# Patient Record
Sex: FEMALE | Race: WHITE | Employment: FULL TIME | URBAN - METROPOLITAN AREA
[De-identification: names, ages, dates, MRNs, and addresses within clinical notes are randomized per-mention and may not be internally consistent; named-entity substitution may affect disease eponyms.]

---

## 2017-01-12 ENCOUNTER — HOSPITAL ENCOUNTER (OUTPATIENT)
Dept: ULTRASOUND IMAGING | Age: 37
Discharge: HOME | End: 2017-01-12
Attending: OBSTETRICS & GYNECOLOGY
Payer: COMMERCIAL

## 2017-01-12 DIAGNOSIS — N94.89 ADNEXAL MASS: ICD-10-CM

## 2017-01-12 PROCEDURE — 76830 TRANSVAGINAL US NON-OB: CPT

## 2017-01-12 PROCEDURE — 76856 US EXAM PELVIC COMPLETE: CPT

## 2017-01-17 ENCOUNTER — HOSPITAL ENCOUNTER (OUTPATIENT)
Dept: MAMMOGRAPHY | Age: 37
Discharge: HOME | End: 2017-01-17
Attending: OBSTETRICS & GYNECOLOGY
Payer: COMMERCIAL

## 2017-01-17 DIAGNOSIS — Z12.31 ENCOUNTER FOR SCREENING MAMMOGRAM FOR MALIGNANT NEOPLASM OF BREAST: ICD-10-CM

## 2017-01-17 PROCEDURE — 77067 SCR MAMMO BI INCL CAD: CPT

## 2019-10-22 PROBLEM — G43.829 MENSTRUAL MIGRAINE, NOT INTRACTABLE, WITHOUT STATUS MIGRAINOSUS: Status: ACTIVE | Noted: 2019-05-17

## 2019-10-29 ENCOUNTER — LAB ENCOUNTER (OUTPATIENT)
Dept: LAB | Age: 39
End: 2019-10-29
Attending: NURSE PRACTITIONER
Payer: COMMERCIAL

## 2019-10-29 DIAGNOSIS — M25.571 ACUTE RIGHT ANKLE PAIN: ICD-10-CM

## 2019-10-29 PROCEDURE — 85025 COMPLETE CBC W/AUTO DIFF WBC: CPT

## 2019-10-29 PROCEDURE — 84550 ASSAY OF BLOOD/URIC ACID: CPT

## 2019-10-29 PROCEDURE — 85652 RBC SED RATE AUTOMATED: CPT

## 2019-10-29 PROCEDURE — 80048 BASIC METABOLIC PNL TOTAL CA: CPT

## 2019-11-26 ENCOUNTER — ANESTHESIA EVENT (OUTPATIENT)
Dept: ENDOSCOPY | Facility: HOSPITAL | Age: 39
End: 2019-11-26
Payer: COMMERCIAL

## 2019-11-27 ENCOUNTER — HOSPITAL ENCOUNTER (OUTPATIENT)
Facility: HOSPITAL | Age: 39
Setting detail: HOSPITAL OUTPATIENT SURGERY
Discharge: ANOTHER HOSPITAL | End: 2019-11-27
Attending: INTERNAL MEDICINE | Admitting: INTERNAL MEDICINE
Payer: COMMERCIAL

## 2019-11-27 ENCOUNTER — ANESTHESIA (OUTPATIENT)
Dept: ENDOSCOPY | Facility: HOSPITAL | Age: 39
End: 2019-11-27
Payer: COMMERCIAL

## 2019-11-27 ENCOUNTER — HOSPITAL ENCOUNTER (EMERGENCY)
Facility: HOSPITAL | Age: 39
Discharge: HOME | End: 2019-11-27
Attending: EMERGENCY MEDICINE
Payer: COMMERCIAL

## 2019-11-27 ENCOUNTER — APPOINTMENT (OUTPATIENT)
Dept: ULTRASOUND IMAGING | Facility: HOSPITAL | Age: 39
End: 2019-11-27
Attending: EMERGENCY MEDICINE
Payer: COMMERCIAL

## 2019-11-27 VITALS
WEIGHT: 180 LBS | DIASTOLIC BLOOD PRESSURE: 78 MMHG | HEART RATE: 104 BPM | OXYGEN SATURATION: 100 % | SYSTOLIC BLOOD PRESSURE: 114 MMHG | BODY MASS INDEX: 28.93 KG/M2 | RESPIRATION RATE: 18 BRPM | HEIGHT: 66 IN

## 2019-11-27 VITALS
TEMPERATURE: 98 F | HEART RATE: 94 BPM | DIASTOLIC BLOOD PRESSURE: 57 MMHG | WEIGHT: 180 LBS | SYSTOLIC BLOOD PRESSURE: 90 MMHG | BODY MASS INDEX: 28.59 KG/M2 | RESPIRATION RATE: 16 BRPM | HEIGHT: 66.5 IN | OXYGEN SATURATION: 97 %

## 2019-11-27 DIAGNOSIS — K52.9 COLITIS: ICD-10-CM

## 2019-11-27 DIAGNOSIS — M79.89 LEG SWELLING: Primary | ICD-10-CM

## 2019-11-27 DIAGNOSIS — M02.30 REACTIVE ARTHRITIS (HCC): ICD-10-CM

## 2019-11-27 DIAGNOSIS — L52 ERYTHEMA NODOSUM: ICD-10-CM

## 2019-11-27 PROCEDURE — 0DBE8ZX EXCISION OF LARGE INTESTINE, VIA NATURAL OR ARTIFICIAL OPENING ENDOSCOPIC, DIAGNOSTIC: ICD-10-PCS | Performed by: INTERNAL MEDICINE

## 2019-11-27 PROCEDURE — 96374 THER/PROPH/DIAG INJ IV PUSH: CPT

## 2019-11-27 PROCEDURE — 99284 EMERGENCY DEPT VISIT MOD MDM: CPT

## 2019-11-27 PROCEDURE — 93970 EXTREMITY STUDY: CPT | Performed by: EMERGENCY MEDICINE

## 2019-11-27 PROCEDURE — 88305 TISSUE EXAM BY PATHOLOGIST: CPT | Performed by: INTERNAL MEDICINE

## 2019-11-27 PROCEDURE — 81025 URINE PREGNANCY TEST: CPT | Performed by: INTERNAL MEDICINE

## 2019-11-27 RX ORDER — SODIUM CHLORIDE, SODIUM LACTATE, POTASSIUM CHLORIDE, CALCIUM CHLORIDE 600; 310; 30; 20 MG/100ML; MG/100ML; MG/100ML; MG/100ML
INJECTION, SOLUTION INTRAVENOUS CONTINUOUS
Status: DISCONTINUED | OUTPATIENT
Start: 2019-11-27 | End: 2019-11-27

## 2019-11-27 RX ORDER — METHYLPREDNISOLONE SODIUM SUCCINATE 40 MG/ML
20 INJECTION, POWDER, LYOPHILIZED, FOR SOLUTION INTRAMUSCULAR; INTRAVENOUS ONCE
Status: COMPLETED | OUTPATIENT
Start: 2019-11-27 | End: 2019-11-27

## 2019-11-27 RX ADMIN — SODIUM CHLORIDE, SODIUM LACTATE, POTASSIUM CHLORIDE, CALCIUM CHLORIDE: 600; 310; 30; 20 INJECTION, SOLUTION INTRAVENOUS at 08:35:00

## 2019-11-27 NOTE — ANESTHESIA POSTPROCEDURE EVALUATION
[facility] Patient Status:  Hospital Outpatient Surgery   Age/Gender 35year old female MRN [id number]   Location [facility]. Attending Jacklyn Clemens MD   Hosp Day # 0 PCP Lady Sal MD       Anesthesia Pos

## 2019-11-27 NOTE — OPERATIVE REPORT
[facility]    PATIENT'S NAME: Kaylee Jason   ATTENDING PHYSICIAN: Marito Beach M.D. OPERATING PHYSICIAN: Marito Beach M.D.    PATIENT ACCOUNT#:   [de-identified]    LOCATION:  [facility] ROOMS 8 EDW  MEDICAL RECORD #:   [id number]

## 2019-11-27 NOTE — BRIEF OP NOTE
Pre-Operative Diagnosis: Colitis [K52.9]     Post-Operative Diagnosis: ulcerative colitis      Procedure Performed:   Procedure(s):  Colonoscopy with biopsy    Surgeon(s) and Role:     Helen Steen MD - Primary    Assistant(s):        Surgical Find

## 2019-11-27 NOTE — H&P
[facility] Endoscopy Health History   [facility] Department of  Gastroenterology  Update Health History :       Nasir Mcclure  female   Kin MD Alejandra     [id number]  7/30/1980 Primary Care Physician  Kofi Fagan MD        HPI  (90 Base) MCG/ACT Inhalation Aero Soln, INHALE 2 PUFFS BY MOUTH EVERY 4 HOURS AS NEEDED, Disp: 1 Inhaler, Rfl: 3  Levonorgestrel-Ethinyl Estrad (KRISTINE-28) 0.15-30 MG-MCG Oral Tab, Take by mouth., Disp: , Rfl:   SUMATRIPTAN SUCCINATE 50 MG Oral Tab answered, patient verbalized understanding and agreed to proceed with procedure as scheduled.       Aki Roberts MD

## 2019-11-27 NOTE — ED PROVIDER NOTES
Patient Seen in: [facility] Emergency Department      History   Patient presents with:  Swelling Edema (cardiovascular, metabolic)    Stated Complaint: swelling    HPI    54-year-old with a history of asthma presents from the GI lab for lower extrem Alcohol use: Yes      Frequency: Monthly or less      Drinks per session: 1 or 2      Binge frequency: Never    Drug use: No             Review of Systems    Positive for stated complaint: swelling  Other systems are as noted in HPI.   Constitutional and vi Impression:  Leg swelling  (primary encounter diagnosis)  Erythema nodosum  Reactive arthritis [facility])    Disposition:  Discharge  11/27/2019 11:22 am    Follow-up:  Redd Jaquez MD  [facility] Doctor [address]  [address] [place] [address]  [phone] c

## 2019-11-27 NOTE — ANESTHESIA PREPROCEDURE EVALUATION
PRE-OP EVALUATION    Patient Name: Collene Mcardle    Pre-op Diagnosis: Colitis [K52.9]    Procedure(s):  Colonoscopy    Surgeon(s) and Role:     Magy Lebron MD - Primary    Pre-op vitals reviewed. Body mass index is 28.62 kg/m².     Current m 2      Binge frequency: Never      Drug use: No     Available pre-op labs reviewed.   Lab Results   Component Value Date    WBC 8.2 10/29/2019    RBC 4.44 10/29/2019    HGB 12.8 10/29/2019    HCT 38.3 10/29/2019    MCV 86.3 10/29/2019    MCH 28.8 10/29/2019

## 2019-11-27 NOTE — OR NURSING
Patient's IV saline locked for pending discharge to Emergency Room.   ER Charge RN notified of patient's pending arrival.  Pt transported via wheelchair to ER; pt's sister Jose Hearn and all belongings with patient

## 2019-12-02 NOTE — PROGRESS NOTES
12/2/2019  Leigha Rai  [facility]    Dear Rigoberto Romero,       Here are the  biopsy/pathology findings from your recent Colonoscopy :    colitis - an inflammation of the large intestine (colon).       If you need any further assistance , p

## 2019-12-11 PROBLEM — K51.90 ULCERATIVE COLITIS (HCC): Status: ACTIVE | Noted: 2019-12-11

## 2019-12-17 ENCOUNTER — HOSPITAL ENCOUNTER (OUTPATIENT)
Dept: GENERAL RADIOLOGY | Age: 39
Discharge: HOME | End: 2019-12-17
Attending: INTERNAL MEDICINE
Payer: COMMERCIAL

## 2019-12-17 DIAGNOSIS — R76.12 FALSE POSITIVE QUANTIFERON-TB GOLD TEST: ICD-10-CM

## 2019-12-17 PROCEDURE — 71046 X-RAY EXAM CHEST 2 VIEWS: CPT | Performed by: INTERNAL MEDICINE

## 2020-02-27 ENCOUNTER — HOSPITAL ENCOUNTER (INPATIENT)
Facility: HOSPITAL | Age: 40
LOS: 3 days | Discharge: HOME | DRG: 065 | End: 2020-03-01
Attending: EMERGENCY MEDICINE | Admitting: HOSPITALIST
Payer: COMMERCIAL

## 2020-02-27 ENCOUNTER — APPOINTMENT (OUTPATIENT)
Dept: GENERAL RADIOLOGY | Age: 40
DRG: 065 | End: 2020-02-27
Attending: EMERGENCY MEDICINE
Payer: COMMERCIAL

## 2020-02-27 ENCOUNTER — APPOINTMENT (OUTPATIENT)
Dept: MRI IMAGING | Age: 40
DRG: 065 | End: 2020-02-27
Attending: EMERGENCY MEDICINE
Payer: COMMERCIAL

## 2020-02-27 DIAGNOSIS — I63.9 ACUTE ISCHEMIC STROKE (HCC): Primary | ICD-10-CM

## 2020-02-27 DIAGNOSIS — R41.0 DISORIENTATION: ICD-10-CM

## 2020-02-27 PROBLEM — E87.6 HYPOKALEMIA: Status: ACTIVE | Noted: 2020-02-27

## 2020-02-27 LAB
ALBUMIN SERPL-MCNC: 3.9 G/DL (ref 3.4–5)
ALBUMIN/GLOB SERPL: 0.9 {RATIO} (ref 1–2)
ALP LIVER SERPL-CCNC: 59 U/L (ref 37–98)
ALT SERPL-CCNC: 13 U/L (ref 13–56)
AMPHET UR QL SCN: NEGATIVE
ANION GAP SERPL CALC-SCNC: 9 MMOL/L (ref 0–18)
APTT PPP: 21.6 SECONDS (ref 25.4–36.1)
AST SERPL-CCNC: 14 U/L (ref 15–37)
BARBITURATES UR QL SCN: NEGATIVE
BASOPHILS # BLD AUTO: 0.02 X10(3) UL (ref 0–0.2)
BASOPHILS NFR BLD AUTO: 0.2 %
BENZODIAZ UR QL SCN: NEGATIVE
BILIRUB SERPL-MCNC: 0.3 MG/DL (ref 0.1–2)
BILIRUB UR QL STRIP.AUTO: NEGATIVE
BUN BLD-MCNC: 12 MG/DL (ref 7–18)
BUN/CREAT SERPL: 13.5 (ref 10–20)
CALCIUM BLD-MCNC: 8.9 MG/DL (ref 8.5–10.1)
CANNABINOIDS UR QL SCN: NEGATIVE
CHLORIDE SERPL-SCNC: 105 MMOL/L (ref 98–112)
CO2 SERPL-SCNC: 22 MMOL/L (ref 21–32)
COCAINE UR QL: NEGATIVE
COLOR UR AUTO: YELLOW
CREAT BLD-MCNC: 0.89 MG/DL (ref 0.55–1.02)
CREAT UR-SCNC: 110 MG/DL
DEPRECATED RDW RBC AUTO: 37.3 FL (ref 35.1–46.3)
EOSINOPHIL # BLD AUTO: 0.35 X10(3) UL (ref 0–0.7)
EOSINOPHIL NFR BLD AUTO: 4.3 %
ERYTHROCYTE [DISTWIDTH] IN BLOOD BY AUTOMATED COUNT: 12.1 % (ref 11–15)
ETHANOL SERPL-MCNC: <3 MG/DL (ref ?–3)
ETHANOL UR-MCNC: NEGATIVE MG/DL
GLOBULIN PLAS-MCNC: 4.5 G/DL (ref 2.8–4.4)
GLUCOSE BLD-MCNC: 105 MG/DL (ref 70–99)
GLUCOSE BLD-MCNC: 99 MG/DL (ref 70–99)
GLUCOSE UR STRIP.AUTO-MCNC: NEGATIVE MG/DL
HCT VFR BLD AUTO: 39.7 % (ref 35–48)
HGB BLD-MCNC: 13.6 G/DL (ref 12–16)
IMM GRANULOCYTES # BLD AUTO: 0.03 X10(3) UL (ref 0–1)
IMM GRANULOCYTES NFR BLD: 0.4 %
INR BLD: 1.27 (ref 0.9–1.1)
KETONES UR STRIP.AUTO-MCNC: NEGATIVE MG/DL
LYMPHOCYTES # BLD AUTO: 1.46 X10(3) UL (ref 1–4)
LYMPHOCYTES NFR BLD AUTO: 17.8 %
M PROTEIN MFR SERPL ELPH: 8.4 G/DL (ref 6.4–8.2)
MCH RBC QN AUTO: 29.3 PG (ref 26–34)
MCHC RBC AUTO-ENTMCNC: 34.3 G/DL (ref 31–37)
MCV RBC AUTO: 85.6 FL (ref 80–100)
MONOCYTES # BLD AUTO: 0.73 X10(3) UL (ref 0.1–1)
MONOCYTES NFR BLD AUTO: 8.9 %
NEUTROPHILS # BLD AUTO: 5.59 X10 (3) UL (ref 1.5–7.7)
NEUTROPHILS # BLD AUTO: 5.59 X10(3) UL (ref 1.5–7.7)
NEUTROPHILS NFR BLD AUTO: 68.4 %
NITRITE UR QL STRIP.AUTO: NEGATIVE
OPIATES UR QL SCN: NEGATIVE
OSMOLALITY SERPL CALC.SUM OF ELEC: 282 MOSM/KG (ref 275–295)
PCP UR QL SCN: NEGATIVE
PH UR STRIP.AUTO: 6.5 [PH] (ref 4.5–8)
PLATELET # BLD AUTO: 235 10(3)UL (ref 150–450)
POCT LOT NUMBER: NORMAL
POCT URINE PREGNANCY: NEGATIVE
POTASSIUM SERPL-SCNC: 3.5 MMOL/L (ref 3.5–5.1)
PROT UR STRIP.AUTO-MCNC: NEGATIVE MG/DL
PSA SERPL DL<=0.01 NG/ML-MCNC: 16.2 SECONDS (ref 12.2–14.4)
RBC # BLD AUTO: 4.64 X10(6)UL (ref 3.8–5.3)
RBC UR QL AUTO: NEGATIVE
SED RATE-ML: 26 MM/HR (ref 0–25)
SODIUM SERPL-SCNC: 136 MMOL/L (ref 136–145)
SP GR UR STRIP.AUTO: 1.01 (ref 1–1.03)
T4 FREE SERPL-MCNC: 1 NG/DL (ref 0.8–1.7)
TROPONIN I SERPL-MCNC: <0.045 NG/ML (ref ?–0.04)
UROBILINOGEN UR STRIP.AUTO-MCNC: 0.2 MG/DL
WBC # BLD AUTO: 8.2 X10(3) UL (ref 4–11)

## 2020-02-27 PROCEDURE — 70553 MRI BRAIN STEM W/O & W/DYE: CPT | Performed by: EMERGENCY MEDICINE

## 2020-02-27 PROCEDURE — 99291 CRITICAL CARE FIRST HOUR: CPT | Performed by: NURSE PRACTITIONER

## 2020-02-27 PROCEDURE — 70544 MR ANGIOGRAPHY HEAD W/O DYE: CPT | Performed by: EMERGENCY MEDICINE

## 2020-02-27 PROCEDURE — 71045 X-RAY EXAM CHEST 1 VIEW: CPT | Performed by: EMERGENCY MEDICINE

## 2020-02-27 RX ORDER — LORAZEPAM 2 MG/ML
0.5 INJECTION INTRAMUSCULAR ONCE
Status: COMPLETED | OUTPATIENT
Start: 2020-02-27 | End: 2020-02-27

## 2020-02-27 RX ORDER — SODIUM CHLORIDE 9 MG/ML
INJECTION, SOLUTION INTRAVENOUS CONTINUOUS
Status: ACTIVE | OUTPATIENT
Start: 2020-02-27 | End: 2020-02-29

## 2020-02-27 RX ORDER — ACETAMINOPHEN 325 MG/1
650 TABLET ORAL EVERY 4 HOURS PRN
Status: DISCONTINUED | OUTPATIENT
Start: 2020-02-27 | End: 2020-03-01

## 2020-02-27 RX ORDER — ASPIRIN 81 MG/1
324 TABLET, CHEWABLE ORAL ONCE
Status: COMPLETED | OUTPATIENT
Start: 2020-02-27 | End: 2020-02-27

## 2020-02-27 RX ORDER — DOCUSATE SODIUM 100 MG/1
100 CAPSULE, LIQUID FILLED ORAL 2 TIMES DAILY
Status: DISCONTINUED | OUTPATIENT
Start: 2020-02-27 | End: 2020-03-01

## 2020-02-27 RX ORDER — FAMOTIDINE 20 MG/1
20 TABLET ORAL 2 TIMES DAILY
Status: DISCONTINUED | OUTPATIENT
Start: 2020-02-27 | End: 2020-02-28

## 2020-02-27 RX ORDER — PHENYLEPHRINE HCL IN 0.9% NACL 50MG/250ML
PLASTIC BAG, INJECTION (ML) INTRAVENOUS CONTINUOUS PRN
Status: DISCONTINUED | OUTPATIENT
Start: 2020-02-27 | End: 2020-03-01

## 2020-02-27 RX ORDER — ONDANSETRON 2 MG/ML
4 INJECTION INTRAMUSCULAR; INTRAVENOUS EVERY 6 HOURS PRN
Status: DISCONTINUED | OUTPATIENT
Start: 2020-02-27 | End: 2020-03-01

## 2020-02-27 RX ORDER — ATORVASTATIN CALCIUM 80 MG/1
80 TABLET, FILM COATED ORAL NIGHTLY
Status: DISCONTINUED | OUTPATIENT
Start: 2020-02-27 | End: 2020-03-01

## 2020-02-27 RX ORDER — BISACODYL 10 MG
10 SUPPOSITORY, RECTAL RECTAL
Status: DISCONTINUED | OUTPATIENT
Start: 2020-02-27 | End: 2020-03-01

## 2020-02-27 RX ORDER — POLYETHYLENE GLYCOL 3350 17 G/17G
17 POWDER, FOR SOLUTION ORAL DAILY PRN
Status: DISCONTINUED | OUTPATIENT
Start: 2020-02-27 | End: 2020-03-01

## 2020-02-27 RX ORDER — HEPARIN SODIUM 5000 [USP'U]/ML
5000 INJECTION, SOLUTION INTRAVENOUS; SUBCUTANEOUS EVERY 12 HOURS SCHEDULED
Status: DISCONTINUED | OUTPATIENT
Start: 2020-02-27 | End: 2020-03-01

## 2020-02-27 RX ORDER — ASPIRIN 300 MG
300 SUPPOSITORY, RECTAL RECTAL DAILY
Status: DISCONTINUED | OUTPATIENT
Start: 2020-02-28 | End: 2020-03-01

## 2020-02-27 RX ORDER — ACETAMINOPHEN 650 MG/1
650 SUPPOSITORY RECTAL EVERY 4 HOURS PRN
Status: DISCONTINUED | OUTPATIENT
Start: 2020-02-27 | End: 2020-03-01

## 2020-02-27 RX ORDER — METOCLOPRAMIDE HYDROCHLORIDE 5 MG/ML
10 INJECTION INTRAMUSCULAR; INTRAVENOUS EVERY 8 HOURS PRN
Status: DISCONTINUED | OUTPATIENT
Start: 2020-02-27 | End: 2020-03-01

## 2020-02-27 RX ORDER — LABETALOL HYDROCHLORIDE 5 MG/ML
10 INJECTION, SOLUTION INTRAVENOUS EVERY 10 MIN PRN
Status: DISCONTINUED | OUTPATIENT
Start: 2020-02-27 | End: 2020-03-01

## 2020-02-27 RX ORDER — SODIUM CHLORIDE 9 MG/ML
INJECTION, SOLUTION INTRAVENOUS CONTINUOUS
Status: ACTIVE | OUTPATIENT
Start: 2020-02-27 | End: 2020-02-27

## 2020-02-27 RX ORDER — FAMOTIDINE 10 MG/ML
20 INJECTION, SOLUTION INTRAVENOUS 2 TIMES DAILY
Status: DISCONTINUED | OUTPATIENT
Start: 2020-02-27 | End: 2020-02-28

## 2020-02-27 RX ORDER — SODIUM PHOSPHATE, DIBASIC AND SODIUM PHOSPHATE, MONOBASIC 7; 19 G/133ML; G/133ML
1 ENEMA RECTAL ONCE AS NEEDED
Status: DISCONTINUED | OUTPATIENT
Start: 2020-02-27 | End: 2020-03-01

## 2020-02-27 RX ORDER — ASPIRIN 325 MG
325 TABLET ORAL DAILY
Status: DISCONTINUED | OUTPATIENT
Start: 2020-02-28 | End: 2020-03-01

## 2020-02-27 RX ORDER — SENNOSIDES 8.6 MG
17.2 TABLET ORAL NIGHTLY
Status: DISCONTINUED | OUTPATIENT
Start: 2020-02-27 | End: 2020-03-01

## 2020-02-27 NOTE — ED INITIAL ASSESSMENT (HPI)
36 y/o female to ED with c/o of altered mental status. Per coworker patient was fine this morning. At about 1430, patient was not acting norm, \"unable to recall how to do a hemoglobin stick on a child\".  As day went on, mental status changes were not impr

## 2020-02-27 NOTE — ED PROVIDER NOTES
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 No abdominal masses.   No peritoneal signs   Extremities: no edema, normal peripheral pulses   Neuro: Alert and nonfo Abnormality         Status                     ---------                               -----------         ------                     CBC W/ DIFFERENTIAL[[id number]]                              Final result                 Please view results fo paged.    8:15 PM–Case discussed with Dr. Heidi Man from stroke neurology. Patient given aspirin after passing her swallow test.    Patient had no worsening during course in the emergency department. She was stable.      02/27/20 2008 02/27/20 2035 02/27

## 2020-02-28 ENCOUNTER — APPOINTMENT (OUTPATIENT)
Dept: CV DIAGNOSTICS | Facility: HOSPITAL | Age: 40
DRG: 065 | End: 2020-02-28
Attending: EMERGENCY MEDICINE
Payer: COMMERCIAL

## 2020-02-28 ENCOUNTER — APPOINTMENT (OUTPATIENT)
Dept: ULTRASOUND IMAGING | Facility: HOSPITAL | Age: 40
DRG: 065 | End: 2020-02-28
Attending: INTERNAL MEDICINE
Payer: COMMERCIAL

## 2020-02-28 PROBLEM — I63.512 ACUTE ISCHEMIC LEFT MCA STROKE (HCC): Status: ACTIVE | Noted: 2020-02-27

## 2020-02-28 LAB
ATRIAL RATE: 121 BPM
C DIFF TOX B STL QL: NEGATIVE
CHOLEST SMN-MCNC: 187 MG/DL (ref ?–200)
CRP SERPL-MCNC: 0.63 MG/DL (ref ?–0.3)
EST. AVERAGE GLUCOSE BLD GHB EST-MCNC: 91 MG/DL (ref 68–126)
GLUCOSE BLD-MCNC: 119 MG/DL (ref 70–99)
GLUCOSE BLD-MCNC: 123 MG/DL (ref 70–99)
GLUCOSE BLD-MCNC: 91 MG/DL (ref 70–99)
GLUCOSE BLD-MCNC: 99 MG/DL (ref 70–99)
HAV IGM SER QL: 2.1 MG/DL (ref 1.6–2.6)
HBA1C MFR BLD HPLC: 4.8 % (ref ?–5.7)
HDLC SERPL-MCNC: 74 MG/DL (ref 40–59)
LDLC SERPL CALC-MCNC: 82 MG/DL (ref ?–100)
NONHDLC SERPL-MCNC: 113 MG/DL (ref ?–130)
P AXIS: 55 DEGREES
P-R INTERVAL: 132 MS
POTASSIUM SERPL-SCNC: 3.8 MMOL/L (ref 3.5–5.1)
Q-T INTERVAL: 340 MS
QRS DURATION: 66 MS
QTC CALCULATION (BEZET): 482 MS
R AXIS: 48 DEGREES
T AXIS: 46 DEGREES
TRIGL SERPL-MCNC: 156 MG/DL (ref 30–149)
VENTRICULAR RATE: 121 BPM
VLDLC SERPL CALC-MCNC: 31 MG/DL (ref 0–30)

## 2020-02-28 PROCEDURE — 99291 CRITICAL CARE FIRST HOUR: CPT | Performed by: INTERNAL MEDICINE

## 2020-02-28 PROCEDURE — 99221 1ST HOSP IP/OBS SF/LOW 40: CPT | Performed by: NURSE PRACTITIONER

## 2020-02-28 PROCEDURE — 93306 TTE W/DOPPLER COMPLETE: CPT | Performed by: NURSE PRACTITIONER

## 2020-02-28 PROCEDURE — 93880 EXTRACRANIAL BILAT STUDY: CPT | Performed by: INTERNAL MEDICINE

## 2020-02-28 RX ORDER — ALBUTEROL SULFATE 2.5 MG/3ML
3 SOLUTION RESPIRATORY (INHALATION) EVERY 4 HOURS PRN
Status: DISCONTINUED | OUTPATIENT
Start: 2020-02-28 | End: 2020-03-01

## 2020-02-28 RX ORDER — POTASSIUM CHLORIDE 20 MEQ/1
40 TABLET, EXTENDED RELEASE ORAL ONCE
Status: COMPLETED | OUTPATIENT
Start: 2020-02-28 | End: 2020-02-28

## 2020-02-28 RX ORDER — BALSALAZIDE DISODIUM 750 MG/1
2250 CAPSULE ORAL 3 TIMES DAILY
Status: DISCONTINUED | OUTPATIENT
Start: 2020-02-28 | End: 2020-03-01

## 2020-02-28 RX ORDER — DICYCLOMINE HYDROCHLORIDE 10 MG/1
10 CAPSULE ORAL
Status: DISCONTINUED | OUTPATIENT
Start: 2020-02-28 | End: 2020-03-01

## 2020-02-28 RX ORDER — POTASSIUM CHLORIDE 20 MEQ/1
40 TABLET, EXTENDED RELEASE ORAL EVERY 4 HOURS
Status: COMPLETED | OUTPATIENT
Start: 2020-02-28 | End: 2020-02-28

## 2020-02-28 NOTE — ED NOTES
Report given to floor RN Antoni. THE [facility] OF [facility] ambulance arrived for patient. Patient in stable condition.

## 2020-02-28 NOTE — ED NOTES
Patient returned from MRI, patient in stable condition. Condition remains unchanged, continues to have delayed responses. Patient scored a 2 on NIH, unable to state  and best language scored a 1. Sister and mother now at bedside.

## 2020-02-28 NOTE — PROGRESS NOTES
[facility]  Neurocritical Care APRN Progress Note    NAME: Yessica Hassan - ROOM: 1596/4420-Q - MRN: [id number] - Age: 35year old - :1980    History Of Present Illness:  Yessica Hassan is a 35year old female with PMHx significant [facility] ENDOSCOPY   • Dilation/curettage,diagnostic      2009   • Cherelle localization wire 1 site right (cpt=19281) Left 9/2010, 2/11    breast abcess   • Other  1998    laproscopic for endometriosis     Social Hx:  Social History    Tobacco Use      Smoking statu extremities  Skin: Skin color, texture, turgor normal for ethnicity, no rashes or lesions, warm and dry  Neurologic: This patient is alert and orientated x 3--by yes no questions. Speech minimal/near mute, slow responses when speaks.  Pupils equally round 02/27/2020    INR 1.27 02/27/2020     Assessment/Plan:  1. Acute ischemic stroke--left MCA territory based on ED documentation (unable to see imaging currently)  2. Expressive aphasia  3. Delayed response  4.   IBS/UC--will need to hold at least Humira f

## 2020-02-28 NOTE — PHYSICAL THERAPY NOTE
PHYSICAL THERAPY QUICK EVALUATION - INPATIENT    Room Number: 5513/0701-X  Evaluation Date: 2/28/2020  Presenting Problem: S/p L MCA infarct  Physician Order: PT Eval and Treat    History Related to Current Admission:Pt admitted through ER w/ co-worker n derian. OBJECTIVE  Precautions:  Other (Comment)(-180 )  Fall Risk: Standard fall risk    WEIGHT BEARING RESTRICTION  Weight Bearing Restriction: None                PAIN ASSESSMENT  Ratin  Location: Denies      RANGE OF MOTION AND STRENGTH Limits  Stoop/Curb Assistance: Not tested       Skilled Therapy Provided: Pt completed supine to sit w/ complete independence. Pt completed sit<>stand w/ cues for proper hand placement and independence both w/ and w/o ue support.   Pt completed gait 150'x1 Physical Therapy services. Please re-order if a new functional limitation presents during this admission. GOALS  Patient was able to achieve the following goals . ..     Patient was able to transfer Safely and independently   Patient able to ambulate on

## 2020-02-28 NOTE — ED NOTES
Patient's condition remains unchanged. Continues to have delayed responses when asked questions, patient answers correctly. MRI called will be ready for patient ETA x20 minutes.

## 2020-02-28 NOTE — ED NOTES
Per ERMD, neurologist requesting for patient to have aspirin. Patient passed dysphagia screen with 90ml water.

## 2020-02-28 NOTE — CONSULTS
[facility]  Interventional Neuroradiology  Consultation Note    Kiya Snider Patient Status:  Inpatient    1980 MRN [id number]   [facility] 6NE-A Attending Gill Nichols MD   Hosp Day # 1 PCP Melody Soni MD     REASON Medical History:   Diagnosis Date   • Asthma    • Closed fracture of middle or proximal phalanx or phalanges of hand 6/20/2013    Log Date: 03/25/2013    • Colitis    • Endometriosis    • Extrinsic asthma, unspecified    • IBS (irritable bowel syndrome) MG Oral Cap, Sig Take 2,250 mg by mouth 3 (three) times daily. , Start Date , End Date , Taking?  Yes, Authorizing Provider External/Patient, Reported    Medication Dicyclomine HCl 20 MG Oral Tab, Sig Take 20 mg by mouth 4 (four) times daily before meals and PRN  niCARdipine HCl in NaCl (CARDENE) 20 mg/200 ml premix infusion, 5-15 mg/hr, Intravenous, Continuous PRN  phenylephrine in NaCl (BRENDA-SYNEPHRINE) 50 mg/250 ml premix infusion SOLN, 100-200 mcg/min, Intravenous, Continuous PRN  atorvastatin (LIPITOR) tab EOMI, no nystagmus   CN V: Facial sensation normal    CN VII: Symmetric facial movement   CN VIII: Normal hearing   CN IX, XI: uvula and palate elevate symmetrically    CN XI: shoulder shrug equal    CN XII: tongue midline  Motor: No drift, no focal arm or acute onset of speech difficulties and confusion while at work. She denies any associated foal weakness, numbness/tingling, headache or visual difficulties. Her presenting NIHSS was reported a 2 for expressive aphasia.  MRI/MRA brain revealed Left MCA distr

## 2020-02-28 NOTE — CONSULTS
[facility]  Neurocritical Care Consult Note    Laya Ewing Patient Status:  Inpatient    1980 MRN [id number]   [facility] 6NE-A Attending Delmi Brian MD   Hosp Day # 1 PCP Winsome Alvarado MD       Reason REPEAT IN 2 HOURS AS NEEDED, Disp: 14 tablet, Rfl: 3, Past Month at Unknown time  Levonorgestrel-Ethinyl Estrad (KRISTINE-28) 0.15-30 MG-MCG Oral Tab, Take by mouth., Disp: , Rfl: , 2/27/2020 at Unknown time  ALBUTEROL SULFATE  (90 Base) MCG/ACT Inhal Grandfather    • Diabetes Paternal Grandfather        Current Meds:  albuterol sulfate (VENTOLIN) (2.5 MG/3ML) 0.083% nebulizer solution 3 mL, 3 mL, Nebulization, Q4H PRN  Balsalazide Disodium (COLAZAL) cap 2,250 mg, 2,250 mg, Oral, TID  sodium chloride 0. palpitations or lower extremity edema. GI:                         Denies constipation, heartburn or melena. :                       Denies dysuria or hematuria. Skin:                     Denies rashes or open areas. Neuro:                   As per HP breathing complications. FINDINGS: Cardiac silhouette and pulmonary vasculature are unremarkable. No consolidation, pleural effusion or pneumothorax. IMPRESSION: Unremarkable portable chest radiograph.    Dictated by: Santos Morris MD on 2/27/2020 at

## 2020-02-28 NOTE — PHYSICAL THERAPY NOTE
Physical Therapy    Attempted to see pt for PT eval, but pt is on bedrest at this time. Spoke with RN who stated pt may be appropriate following Neuro round. Will f/u either later today or tomorrow pending upgraded activity orders.

## 2020-02-28 NOTE — PLAN OF CARE
Care asumed @ 0730. Awake. alert & oriented, with ongoing expressive aphasia but improved per family from admission. No ataxia or dysarthria. Christiano gtt titrated up to maintain parameters 130-180. Up to [facility] for voiding. RSR per monitor.  Denies HA, nausea or diz Free from bleeding injury  Description  (Example usage: patient with low platelets)  INTERVENTIONS:  - Avoid intramuscular injections, enemas and rectal medication administration  - Ensure safe mobilization of patient  - Hold pressure on venipuncture sites

## 2020-02-28 NOTE — OCCUPATIONAL THERAPY NOTE
OCCUPATIONAL THERAPY EVALUATION - INPATIENT     Room Number: 6873/6620-E  Evaluation Date: 2/28/2020  Type of Evaluation: Initial  Presenting Problem: L MCA CVA     Physician Order: IP Consult to Occupational Therapy  Reason for Therapy: ADL/IADL Dysfuncti OTHER  1998    [facility]       OCCUPATIONAL PROFILE    HOME SITUATION  Type of Home: House  Home Layout: One level  Lives With: Family(2 children (9&16), brother/his girlfriend and child )    Toilet and Equipment: Standard height toilet of cognitive impairment and records cognitive changes over time.  The MMSE addresses 7 cognitive domains including: orientation to time, orientation to place, registration of three words, attention and calculation, recall of three words, language and visual RN/PCT was made aware of pt's present position and condition. Patient End of Session: Up in chair;Needs met;Call light within reach; With [facility] staff;RN aware of session/findings; All patient questions and concerns addressed; Family present    ASSESSMENT     P Goals   Patient will perform all ADLs: with modified independent    Functional Transfer Goals  Patient will perform all functional transfers:  with modified independent    Additional Goals:  9HPT to be administered    strength assessment     Pt will pe

## 2020-02-28 NOTE — PAYOR COMM NOTE
--------------  Appropriate for inpatient status per guidelines for stroke.         ADMISSION REVIEW     Payor: [address] [place] [address] Drive #:  [id number]  Authorization Number: N/A       ED Provider Notes             Patient Seen in: THE [facility] Emergency Depa Exam    General:  Vitals as listed. No acute distress. Peculiar affect. HEENT: Sclerae anicteric. Conjunctivae show no pallor.   Oropharynx clear, mucous membranes moist   Neck: supple, no rigidity   Lungs: good air exchange and clear   Heart: regular r CBC WITH DIFFERENTIAL WITH PLATELET    Narrative: The following orders were created for panel order CBC WITH DIFFERENTIAL WITH PLATELET.   Procedure                               Abnormality         Status                     --------- diagnosis)  Disorientation    Disposition:  Admit  2/27/2020  8:02 pm                   H&P - H&P Note          [facility] Hospitalist H&P       CC: Patient presents with:  Altered Mental Status       PCP: Janel Pagan MD    History of Present Illness: Pa pallor, EOMs intact. Nose: Nares normal. Septum midline. Mucosa normal. No drainage.    Throat: Lips, mucosa, and tongue normal. Teeth and gums normal.   Neck: Supple, symmetrical, trachea midline, no cervical or supraclavicular lymph adenopathy, thyroid MD on 2/27/2020 at 20:11     Approved by: Coleen De Leon MD on 2/27/2020 at 20:11             ASSESSMENT / PLAN:     Acute CVA  -neuro w/u in process per neuro/neuro crit care  -LDL 82 and HDL 74.  -CRP is elevated at 0.63  -UDS neg; UA was contaminate 163/95Abnormal  100 % 190 lb None (Room air)

## 2020-02-28 NOTE — PLAN OF CARE
Received pt from CCU around 1700  Expressive aphasia present  C/o HA. Pt believes from not having caffeine today. Denies need for medcation  Visitor at bedside.  Updated on POC

## 2020-02-28 NOTE — OCCUPATIONAL THERAPY NOTE
Attempted to see pt for OT eval, but pt is on bedrest at this time. Spoke with RN who stated pt may be appropriate following Neuro round. Will f/u either later today or tomorrow pending upgraded activity orders.

## 2020-02-28 NOTE — CM/SW NOTE
[facility] eval order received, PT/OT  on consult, unable to work with patient today due to her being on bedrest, SW  to evaluate and assess for needs post PT/OT  Session. SW will continue to follow up.     Jamie Bueno, [address] [place] [address]

## 2020-02-28 NOTE — SLP NOTE
ADULT SWALLOWING EVALUATION    ASSESSMENT    ASSESSMENT/OVERALL IMPRESSION:  Patient seen for swallowing evaluation. Patient alert and up in bed with her sister present and supportive. Patient admitted due to AMS and difficulty with communication.   Courtney Sutton • Colitis    • Endometriosis    • Extrinsic asthma, unspecified    • IBS (irritable bowel syndrome)    • Migraines    • Stroke [facility])    • Visual impairment           Diet Prior to Admission: Regular; Thin liquids  Precautions: Aspiration    Patient/Family

## 2020-02-28 NOTE — PLAN OF CARE
Received pt at 2115. Pt alert and having expressive aphasia with delayed responses. Q1hr neuro checks. Pt is oriented when given choices but is having trouble stating date and location.  Pt has equal strength in AMY extremities, no visual deficits, no sensa

## 2020-02-28 NOTE — H&P
[facility] Hospitalist H&P       CC: Patient presents with:  Altered Mental Status       PCP: Regine Watts MD    History of Present Illness: Patient is a 35year old female with PMH sig for asthma, ulcerative colitis and migraines is admitted with compl MG Oral Tab, TAKE 1 TABLET BY MOUTH 1 TIME AT ONSET OF MIGRAINE. MAY REPEAT IN 2 HOURS AS NEEDED, Disp: 14 tablet, Rfl: 3  Levonorgestrel-Ethinyl Estrad (KRISTINE-28) 0.15-30 MG-MCG Oral Tab, Take by mouth., Disp: , Rfl:           Soc Hx  Social History    T auscultation bilaterally. Normal effort   Chest wall:  No tenderness or deformity. Heart:  Regular rate and rhythm, S1, S2 normal, no murmur, rub or gallop appreciated   Abdomen:   Soft, non-tender. Bowel sounds normal. No masses,  No organomegaly.  Non d echo  -monitor on tele  -ST/PT/OT  -hold her OCP - this is contraindicated given she has a migraine history with auras    Mild hypokalemia  -replete per protocol    Ulcerative colitis  -hold adalimumab  -cont balsalazide  -resume bentyl when taking PO    M

## 2020-02-29 ENCOUNTER — APPOINTMENT (OUTPATIENT)
Dept: CT IMAGING | Facility: HOSPITAL | Age: 40
DRG: 065 | End: 2020-02-29
Attending: Other
Payer: COMMERCIAL

## 2020-02-29 LAB
ALBUMIN SERPL-MCNC: 3 G/DL (ref 3.4–5)
ALBUMIN/GLOB SERPL: 0.8 {RATIO} (ref 1–2)
ALP LIVER SERPL-CCNC: 47 U/L (ref 37–98)
ALT SERPL-CCNC: 10 U/L (ref 13–56)
ANION GAP SERPL CALC-SCNC: 5 MMOL/L (ref 0–18)
AST SERPL-CCNC: 11 U/L (ref 15–37)
BILIRUB SERPL-MCNC: 0.5 MG/DL (ref 0.1–2)
BUN BLD-MCNC: 6 MG/DL (ref 7–18)
BUN/CREAT SERPL: 10 (ref 10–20)
CALCIUM BLD-MCNC: 8.3 MG/DL (ref 8.5–10.1)
CHLORIDE SERPL-SCNC: 113 MMOL/L (ref 98–112)
CO2 SERPL-SCNC: 22 MMOL/L (ref 21–32)
CREAT BLD-MCNC: 0.6 MG/DL (ref 0.55–1.02)
GLOBULIN PLAS-MCNC: 3.8 G/DL (ref 2.8–4.4)
GLUCOSE BLD-MCNC: 101 MG/DL (ref 70–99)
GLUCOSE BLD-MCNC: 130 MG/DL (ref 70–99)
GLUCOSE BLD-MCNC: 89 MG/DL (ref 70–99)
GLUCOSE BLD-MCNC: 90 MG/DL (ref 70–99)
GLUCOSE BLD-MCNC: 97 MG/DL (ref 70–99)
M PROTEIN MFR SERPL ELPH: 6.8 G/DL (ref 6.4–8.2)
OSMOLALITY SERPL CALC.SUM OF ELEC: 288 MOSM/KG (ref 275–295)
POTASSIUM SERPL-SCNC: 4 MMOL/L (ref 3.5–5.1)
POTASSIUM SERPL-SCNC: 4.1 MMOL/L (ref 3.5–5.1)
SODIUM SERPL-SCNC: 140 MMOL/L (ref 136–145)

## 2020-02-29 PROCEDURE — 70496 CT ANGIOGRAPHY HEAD: CPT | Performed by: OTHER

## 2020-02-29 PROCEDURE — 70498 CT ANGIOGRAPHY NECK: CPT | Performed by: OTHER

## 2020-02-29 NOTE — PLAN OF CARE
Assumed care. Patient stable. Some aphasia noted previously, but none noted on last neuro check. Patient went for carotid US and MD called from radiology noting a filling defect involving the right common carotid artery with some turbulent flow.  Results ca Discontinue feeding and notify MD (or speech pathologist) if coughing or persistent throat clearing or wet/gurgly vocal quality is noted   Outcome: Progressing

## 2020-02-29 NOTE — PLAN OF CARE
Aphasia evaluation completed this date.     Problem: Impaired Communication  Goal: Patient will achieve maximal communication potential  Description  Interventions:  - Provide modeling for options to improve word retrieval in known context  - Allow addition

## 2020-02-29 NOTE — CONSULTS
Consulting Physician: Dr. Merry Webster    CC: Stroke    HPI:  This is a 35year old female presenting for evaluation of stroke. The patient was apparently at work. All of a sudden, she became confused.   She noted that she had difficulty remembering what her ne Needs      Financial resource strain: Not on file      Food insecurity:        Worry: Not on file        Inability: Not on file      Transportation needs:        Medical: Not on file        Non-medical: Not on file    Tobacco Use      Smoking status: Forme Abduction 5/5, Elbow Flexion 5/5, Elbow Extension 5/5, WF 5/5, WE 5/5, FDI 5/5, ADM 5/5           LUE:  Shoulder Abduction 5/5, Elbow Flexion 5/5, Elbow Extension 5/5, WF 5/5, WE 5/5, FDI 5/5, ADM 5/5           RLE:  Hip Flexion 5/5, Knee Flexion 5/5, Knee

## 2020-02-29 NOTE — PROGRESS NOTES
[facility] Hospitalist Progress Note     PCP: Zion Peterson MD    Chief Complaint: follow-up    Overnight/Interim Events:      SUBJECTIVE:  Pt reports still having speech issues but better. Per RN some issues c #s. No cp/palpitations. No UE/LE weakness. Oral TID   • dicyclomine  10 mg Oral TID AC&HS   • atorvastatin  80 mg Oral Nightly   • aspirin  300 mg Rectal Daily    Or   • aspirin  325 mg Oral Daily   • Senna  17.2 mg Oral Nightly   • docusate sodium  100 mg Oral BID   • Heparin Sodium (Porcine)  5,0

## 2020-03-01 VITALS
TEMPERATURE: 98 F | SYSTOLIC BLOOD PRESSURE: 118 MMHG | HEIGHT: 67 IN | BODY MASS INDEX: 30.03 KG/M2 | OXYGEN SATURATION: 99 % | WEIGHT: 191.31 LBS | DIASTOLIC BLOOD PRESSURE: 66 MMHG | RESPIRATION RATE: 18 BRPM | HEART RATE: 90 BPM

## 2020-03-01 LAB
FACTOR VIII ACTIVITY: 178 %
GLUCOSE BLD-MCNC: 88 MG/DL (ref 70–99)
PROTEIN C FUNCTIONAL: 115 %
PROTEIN C, TOTAL ANTIGEN: >95 %
PROTEIN S FREE, ANTIGEN: 37 %
PROTEIN S FUNCTIONAL: 43 %

## 2020-03-01 RX ORDER — ATORVASTATIN CALCIUM 80 MG/1
80 TABLET, FILM COATED ORAL NIGHTLY
Qty: 30 TABLET | Refills: 0 | Status: SHIPPED | OUTPATIENT
Start: 2020-03-01 | End: 2020-03-01

## 2020-03-01 RX ORDER — ATORVASTATIN CALCIUM 40 MG/1
40 TABLET, FILM COATED ORAL NIGHTLY
Qty: 30 TABLET | Refills: 0 | Status: SHIPPED | OUTPATIENT
Start: 2020-03-01 | End: 2020-03-26

## 2020-03-01 RX ORDER — ASPIRIN 325 MG
325 TABLET ORAL DAILY
Qty: 30 TABLET | Refills: 0 | Status: SHIPPED | OUTPATIENT
Start: 2020-03-02 | End: 2020-12-03

## 2020-03-01 RX ORDER — BUTALBITAL, ACETAMINOPHEN AND CAFFEINE 50; 325; 40 MG/1; MG/1; MG/1
1 TABLET ORAL EVERY 6 HOURS PRN
Status: DISCONTINUED | OUTPATIENT
Start: 2020-03-01 | End: 2020-03-01

## 2020-03-01 RX ORDER — BUTALBITAL, ACETAMINOPHEN AND CAFFEINE 50; 325; 40 MG/1; MG/1; MG/1
1 TABLET ORAL EVERY 6 HOURS PRN
Qty: 30 TABLET | Refills: 0 | Status: SHIPPED | OUTPATIENT
Start: 2020-03-01 | End: 2020-11-03

## 2020-03-01 NOTE — DISCHARGE SUMMARY
[facility] Internal Medicine Discharge Summary   Patient ID:  Yessica Hassan  [id number]  35year old  7/30/1980    Admit date: 2/27/2020    Discharge date and time: 3/1/2020     Attending Physician: Helena Yu MD     Primary Care Physician: Jake Kohler -dc on asa and statin  -no smoking, cardiac hx, prior neuro issues      Mild hypokalemia  -replete per protocol     Ulcerative colitis  -hold adalimumab, resume if ok c neuro  -cont balsalazide  -resume bentyl when taking PO     Migraines with auras  -no h * This list has 2 medication(s) that are the same as other medications prescribed for you. Read the directions carefully, and ask your doctor or other care provider to review them with you.             STOP taking these medications    [place]-28 0.15-30 MG- This report includes an Addendum and supersedes previous reports for this exam.    PROCEDURE:  US CAROTID DOPPLER BILAT - DIAG IM (CPT=93880)  COMPARISON:  None.   INDICATIONS:  stroke  TECHNIQUE:  Real-time gray-scale and duplex ultrasound was used to [employer] significant stenosis involving the left common carotid artery. Critical exam results phoned to nurse Martínez on 2/28/2020 at 2138 hours with read back.       Dictated by: Sean Cheek MD on 2/28/2020 at 21:34     Approved by: Sean Cheek MD on 2/28/2020 a PROCEDURE:  [facility] (CPT=93880)  COMPARISON:  None. INDICATIONS:  stroke  TECHNIQUE:  Real-time gray-scale and duplex ultrasound was used to evaluate the bilateral extracranial carotid and vertebral arteries.   B-mode 2-dimensio CONCLUSION:  There is suggestion of a linear echogenic structure/filling defect involving the proximal right internal carotid artery concerning for possible thrombus versus localized dissection.   No significant stenosis involving the left common carotid ar PROCEDURE:  MRI BRAIN (W+WO) (CPT=70553)  COMPARISON:  None.   INDICATIONS:  AMS - Possible TGA  TECHNIQUE:  MRI of the brain was performed with multi-planar T1, T2-weighted images with FLAIR sequences and diffusion weighted images without and with infusion CONCLUSION:   There is an acute infarct in the anterior 1/3 territory left MCA. There is associated left M 2 branch that has no flow. No hemorrhage is identified. This critical result was discussed with Dr. Latricia Anne at 7:53 pm on 2/27/2020.  Read back wa PROCEDURE:  CTA BRAIN + CTA CAROTIDS (JXR=72482/00245)  COMPARISON:  [place], CT, BRAIN W+W/O CONTRAST, 5/16/2008, 20:22.   INDICATIONS:  Evaluate carotids  TECHNIQUE:  CT angiography of the head and neck were obtained with non-ionic contrast.  3D volume unremarkable in appearance. The distal branches of the bilateral anterior and middle cerebral arteries are unremarkable. Improved patency of left MCA branches compared with recent MRI.   No large vessel occlusion is seen on today's exam.  The basilar sadia

## 2020-03-01 NOTE — PROGRESS NOTES
Subjective     No overnight events    • Balsalazide Disodium  2,250 mg Oral TID   • dicyclomine  10 mg Oral TID AC&HS   • atorvastatin  80 mg Oral Nightly   • aspirin  300 mg Rectal Daily    Or   • aspirin  325 mg Oral Daily   • Senna  17.2 mg Oral Nightly negative  - other considerations include hypercoaguable disorder or from increased risk of thrombotic events from OCPs + Migraine with Aura  - recommend continuing  mg daily  - await hypercoaguable labs  - PT/OT/ST    2.   Migraine with aura  - avoid

## 2020-03-01 NOTE — PLAN OF CARE
Patient A/Ox4, slight expressive aphasia noted during assessment, more so with numbers/time  Room air, no s/s of respiratory distress  Denies pain  IVF  Family at bedside  Call light within reach. Needs voiced and attended to. Will continue to monitor. brush  - Limit straining and forceful nose blowing  Outcome: Progressing     Problem: NEUROLOGICAL - ADULT  Goal: Achieves stable or improved neurological status  Description  INTERVENTIONS  - Assess for and report changes in neurological status  - Initiat

## 2020-03-01 NOTE — PLAN OF CARE
Assumed care @ 0700. Pt a/o x4, VSS. Neuro check q shift, some exp aphasia. Tele SR/ST. No acute respiratory distress noted. C/O HA, not relieved with PRN APAP. Dr. Conrad Mckenzie notified, Karishma Serrano ordered and given with some relief.   Pt ambulated ad l injections, enemas and rectal medication administration  - Ensure safe mobilization of patient  - Hold pressure on venipuncture sites to achieve adequate hemostasis  - Assess for signs and symptoms of internal bleeding  - Monitor lab trends  - Patient is t Adequate for Discharge     Problem: Impaired Communication  Goal: Patient will achieve maximal communication potential  Description  Interventions:  - Provide modeling for options to improve word retrieval in known context  - Allow additional time for proc

## 2020-03-02 LAB
ANTITHROMBIN, ANTIGEN: 71 %
ANTITHROMBIN, ENZYMATIC (ACTIVITY): 84 %
F2 C.20210G>A GENO BLD/T: NORMAL
PHOSPHOLIPID AB, IGG: NEGATIVE
PHOSPHOLIPID AB, IGM: NEGATIVE
PROTEIN S, TOTAL ANTIGEN: 65 %

## 2020-03-02 NOTE — PAYOR COMM NOTE
--------------  DISCHARGE REVIEW    Payor: [address] [place] [address] Drive #:  [id number]  Authorization Number: [id number]    Admit date: 2/27/20  Admit time:  2114  Discharge Date: 3/1/2020  3:30 PM     Admitting Physician: DO MASOUD More History of Present Illness: Patient is a 35year old female with PMH sig for asthma, ulcerative colitis and migraines is admitted with complaint of sudden onset of difficulty speaking, confusion and shaking episode that occurred while at work yesterday.   P Neuro: CN Intact, no focal deficits      Discharge meds     Medication List      START taking these medications    atorvastatin 40 MG Tabs  Commonly known as:  LIPITOR  Take 1 tablet (40 mg total) by mouth nightly.      Butalbital-APAP-Caffeine -40 MG Bring a paper prescription for each of these medications  · aspirin 325 MG Tabs  · atorvastatin 40 MG Tabs         Consults: IP CONSULT TO NEUROLOGY  IP CONSULT TO HOSPITALIST  IP CONSULT TO NEUROLOGY  IP CONSULT TO SOCIAL WORK  IP CONSULT TO PHYSICAL THER This report includes an Addendum and supersedes previous reports for this exam.    PROCEDURE:  US CAROTID DOPPLER BILAT - DIAG IM (CPT=93880)  COMPARISON:  None.   INDICATIONS:  stroke  TECHNIQUE:  Real-time gray-scale and duplex ultrasound was used to [employer] significant stenosis involving the left common carotid artery. Critical exam results phoned to nurse Cindy Harris on 2/28/2020 at 2138 hours with read back.       Dictated by: Mitchell Rios MD on 2/28/2020 at 21:34     Approved by: Mitchell Rios MD on 2/28/2020 a PROCEDURE:  [facility] (CPT[facility]938[facility]  COMPARISON:  None. INDICATIONS:  stroke  TECHNIQUE:  Real-time gray-scale and duplex ultrasound was used to evaluate the bilateral extracranial carotid and vertebral arteries.   B-mode 2-dimensio CONCLUSION:  There is suggestion of a linear echogenic structure/filling defect involving the proximal right internal carotid artery concerning for possible thrombus versus localized dissection.   No significant stenosis involving the left common carotid ar PROCEDURE:  MRI BRAIN (W+WO) (CPT=70553)  COMPARISON:  None.   INDICATIONS:  AMS - Possible TGA  TECHNIQUE:  MRI of the brain was performed with multi-planar T1, T2-weighted images with FLAIR sequences and diffusion weighted images without and with infusion CONCLUSION:   There is an acute infarct in the anterior 1/3 territory left MCA. There is associated left M 2 branch that has no flow. No hemorrhage is identified. This critical result was discussed with Dr. Rosa Whitfield at 7:53 pm on 2/27/2020.  Read back wa PROCEDURE:  CTA BRAIN + CTA CAROTIDS (LJR=62563/47024)  COMPARISON:  [facility], CT, BRAIN W+W/O CONTRAST, 5/16/2008, 20:22.   INDICATIONS:  Evaluate carotids  TECHNIQUE:  CT angiography of the head and neck were obtained with non-ionic contrast.  3D volume unremarkable in appearance. The distal branches of the bilateral anterior and middle cerebral arteries are unremarkable. Improved patency of left MCA branches compared with recent MRI.   No large vessel occlusion is seen on today's exam.  The basilar sadia

## 2020-03-03 LAB
APTT PPP: 31.5 SECONDS (ref 25.4–36.1)
BETA 2 GLYCOPROTEIN 1 AB, IGG: 3.9 U/ML (ref ?–15)
BETA 2 GLYCOPROTEIN 1 AB, IGM: <3.7 U/ML (ref ?–15)
DRVVT LUPUS ANTICOAGULANT: NEGATIVE
DRVVT SCREEN RATIO: 0.93 (ref 0–1.29)
PT(LUPUS): 14 SECONDS (ref 12.5–14.7)
STACLOT LA DELTA: 9.4 SECONDS (ref ?–8)
STACLOT LA: POSITIVE

## 2020-03-13 ENCOUNTER — LAB ENCOUNTER (OUTPATIENT)
Dept: LAB | Age: 40
End: 2020-03-13
Attending: INTERNAL MEDICINE
Payer: COMMERCIAL

## 2020-03-13 DIAGNOSIS — I63.512 ACUTE ISCHEMIC LEFT MCA STROKE (HCC): ICD-10-CM

## 2020-03-13 DIAGNOSIS — D68.51 HETEROZYGOUS FACTOR V LEIDEN MUTATION (HCC): ICD-10-CM

## 2020-03-13 DIAGNOSIS — D68.62 LUPUS ANTICOAGULANT INHIBITOR SYNDROME (HCC): ICD-10-CM

## 2020-03-13 LAB
BASOPHILS # BLD AUTO: 0.02 X10(3) UL (ref 0–0.2)
BASOPHILS NFR BLD AUTO: 0.4 %
DEPRECATED RDW RBC AUTO: 40.9 FL (ref 35.1–46.3)
EOSINOPHIL # BLD AUTO: 0.38 X10(3) UL (ref 0–0.7)
EOSINOPHIL NFR BLD AUTO: 7.6 %
ERYTHROCYTE [DISTWIDTH] IN BLOOD BY AUTOMATED COUNT: 12.4 % (ref 11–15)
HCT VFR BLD AUTO: 39.5 % (ref 35–48)
HGB BLD-MCNC: 13.2 G/DL (ref 12–16)
IMM GRANULOCYTES # BLD AUTO: 0.02 X10(3) UL (ref 0–1)
IMM GRANULOCYTES NFR BLD: 0.4 %
INR BLD: 1.12 (ref 0.9–1.1)
LYMPHOCYTES # BLD AUTO: 1.44 X10(3) UL (ref 1–4)
LYMPHOCYTES NFR BLD AUTO: 29 %
MCH RBC QN AUTO: 29.7 PG (ref 26–34)
MCHC RBC AUTO-ENTMCNC: 33.4 G/DL (ref 31–37)
MCV RBC AUTO: 88.8 FL (ref 80–100)
MONOCYTES # BLD AUTO: 0.54 X10(3) UL (ref 0.1–1)
MONOCYTES NFR BLD AUTO: 10.9 %
NEUTROPHILS # BLD AUTO: 2.57 X10 (3) UL (ref 1.5–7.7)
NEUTROPHILS # BLD AUTO: 2.57 X10(3) UL (ref 1.5–7.7)
NEUTROPHILS NFR BLD AUTO: 51.7 %
PLATELET # BLD AUTO: 261 10(3)UL (ref 150–450)
PSA SERPL DL<=0.01 NG/ML-MCNC: 14.9 SECONDS (ref 12.5–14.7)
RBC # BLD AUTO: 4.45 X10(6)UL (ref 3.8–5.3)
WBC # BLD AUTO: 5 X10(3) UL (ref 4–11)

## 2020-03-13 PROCEDURE — 85610 PROTHROMBIN TIME: CPT

## 2020-03-13 PROCEDURE — 85025 COMPLETE CBC W/AUTO DIFF WBC: CPT

## 2020-03-13 PROCEDURE — 36415 COLL VENOUS BLD VENIPUNCTURE: CPT

## 2020-03-16 ENCOUNTER — LAB ENCOUNTER (OUTPATIENT)
Dept: LAB | Age: 40
End: 2020-03-16
Attending: INTERNAL MEDICINE
Payer: COMMERCIAL

## 2020-03-16 DIAGNOSIS — D68.62 LUPUS ANTICOAGULANT INHIBITOR SYNDROME (HCC): Primary | ICD-10-CM

## 2020-03-16 LAB
INR BLD: 1.37 (ref 0.9–1.1)
PSA SERPL DL<=0.01 NG/ML-MCNC: 17.6 SECONDS (ref 12.5–14.7)

## 2020-03-16 PROCEDURE — 85610 PROTHROMBIN TIME: CPT

## 2020-03-16 PROCEDURE — 36415 COLL VENOUS BLD VENIPUNCTURE: CPT

## 2020-03-18 ENCOUNTER — LABORATORY ENCOUNTER (OUTPATIENT)
Dept: LAB | Age: 40
End: 2020-03-18
Attending: INTERNAL MEDICINE
Payer: COMMERCIAL

## 2020-03-18 DIAGNOSIS — I63.512 ACUTE ISCHEMIC LEFT MCA STROKE (HCC): ICD-10-CM

## 2020-03-18 DIAGNOSIS — D68.62 LUPUS ANTICOAGULANT INHIBITOR SYNDROME (HCC): ICD-10-CM

## 2020-03-18 DIAGNOSIS — D68.51 HETEROZYGOUS FACTOR V LEIDEN MUTATION (HCC): ICD-10-CM

## 2020-03-18 LAB
INR BLD: 1.57 (ref 0.9–1.1)
PSA SERPL DL<=0.01 NG/ML-MCNC: 19.6 SECONDS (ref 12.5–14.7)

## 2020-03-18 PROCEDURE — 85610 PROTHROMBIN TIME: CPT

## 2020-03-18 PROCEDURE — 36415 COLL VENOUS BLD VENIPUNCTURE: CPT

## 2020-03-20 ENCOUNTER — APPOINTMENT (OUTPATIENT)
Dept: LAB | Age: 40
End: 2020-03-20
Attending: INTERNAL MEDICINE
Payer: COMMERCIAL

## 2020-03-20 DIAGNOSIS — D68.51 HETEROZYGOUS FACTOR V LEIDEN MUTATION (HCC): ICD-10-CM

## 2020-03-20 DIAGNOSIS — D68.62 LUPUS ANTICOAGULANT INHIBITOR SYNDROME (HCC): ICD-10-CM

## 2020-03-20 DIAGNOSIS — I63.512 ACUTE ISCHEMIC LEFT MCA STROKE (HCC): ICD-10-CM

## 2020-03-20 LAB
INR BLD: 1.67 (ref 0.9–1.1)
PSA SERPL DL<=0.01 NG/ML-MCNC: 20.6 SECONDS (ref 12.5–14.7)

## 2020-03-20 PROCEDURE — 85610 PROTHROMBIN TIME: CPT

## 2020-03-23 ENCOUNTER — APPOINTMENT (OUTPATIENT)
Dept: LAB | Age: 40
End: 2020-03-23
Attending: INTERNAL MEDICINE
Payer: COMMERCIAL

## 2020-03-23 DIAGNOSIS — D68.51 HETEROZYGOUS FACTOR V LEIDEN MUTATION (HCC): ICD-10-CM

## 2020-03-23 DIAGNOSIS — D68.62 LUPUS ANTICOAGULANT INHIBITOR SYNDROME (HCC): ICD-10-CM

## 2020-03-23 DIAGNOSIS — I63.512 ACUTE ISCHEMIC LEFT MCA STROKE (HCC): ICD-10-CM

## 2020-03-23 LAB
INR BLD: 2.11 (ref 0.89–1.11)
PSA SERPL DL<=0.01 NG/ML-MCNC: 24.2 SECONDS (ref 12.4–14.6)

## 2020-03-23 PROCEDURE — 85610 PROTHROMBIN TIME: CPT

## 2020-04-06 ENCOUNTER — LAB ENCOUNTER (OUTPATIENT)
Dept: LAB | Age: 40
End: 2020-04-06
Attending: INTERNAL MEDICINE
Payer: COMMERCIAL

## 2020-04-06 DIAGNOSIS — D68.62 LUPUS ANTICOAGULANT INHIBITOR SYNDROME (HCC): ICD-10-CM

## 2020-04-06 DIAGNOSIS — D68.51 HETEROZYGOUS FACTOR V LEIDEN MUTATION (HCC): ICD-10-CM

## 2020-04-06 DIAGNOSIS — I63.512 ACUTE ISCHEMIC LEFT MCA STROKE (HCC): ICD-10-CM

## 2020-04-06 PROCEDURE — 85610 PROTHROMBIN TIME: CPT

## 2020-04-13 ENCOUNTER — LAB ENCOUNTER (OUTPATIENT)
Dept: LAB | Age: 40
End: 2020-04-13
Attending: INTERNAL MEDICINE
Payer: COMMERCIAL

## 2020-04-13 DIAGNOSIS — D68.51 HETEROZYGOUS FACTOR V LEIDEN MUTATION (HCC): ICD-10-CM

## 2020-04-13 DIAGNOSIS — D68.62 LUPUS ANTICOAGULANT INHIBITOR SYNDROME (HCC): ICD-10-CM

## 2020-04-13 DIAGNOSIS — I63.512 ACUTE ISCHEMIC LEFT MCA STROKE (HCC): ICD-10-CM

## 2020-04-13 PROCEDURE — 36415 COLL VENOUS BLD VENIPUNCTURE: CPT

## 2020-04-13 PROCEDURE — 85610 PROTHROMBIN TIME: CPT

## 2020-04-17 PROBLEM — Z79.01 LONG TERM (CURRENT) USE OF ANTICOAGULANTS: Status: ACTIVE | Noted: 2020-04-17

## 2020-04-29 ENCOUNTER — TELEPHONE (OUTPATIENT)
Dept: SPEECH THERAPY | Facility: HOSPITAL | Age: 40
End: 2020-04-29

## 2020-05-11 ENCOUNTER — HOSPITAL ENCOUNTER (OUTPATIENT)
Dept: CV DIAGNOSTICS | Age: 40
Discharge: HOME | End: 2020-05-11
Attending: INTERNAL MEDICINE
Payer: COMMERCIAL

## 2020-05-11 DIAGNOSIS — I63.512 ACUTE ISCHEMIC LEFT MCA STROKE (HCC): ICD-10-CM

## 2020-05-11 PROCEDURE — 85610 PROTHROMBIN TIME: CPT | Performed by: INTERNAL MEDICINE

## 2020-06-04 ENCOUNTER — LAB ENCOUNTER (OUTPATIENT)
Dept: LAB | Age: 40
End: 2020-06-04
Attending: INTERNAL MEDICINE
Payer: COMMERCIAL

## 2020-06-04 DIAGNOSIS — I63.512 ACUTE ISCHEMIC LEFT MCA STROKE (HCC): ICD-10-CM

## 2020-06-04 PROCEDURE — 36415 COLL VENOUS BLD VENIPUNCTURE: CPT

## 2020-06-04 PROCEDURE — 85610 PROTHROMBIN TIME: CPT

## 2020-06-04 PROCEDURE — 85025 COMPLETE CBC W/AUTO DIFF WBC: CPT | Performed by: INTERNAL MEDICINE

## 2020-06-12 ENCOUNTER — HOSPITAL ENCOUNTER (OUTPATIENT)
Dept: CV DIAGNOSTICS | Age: 40
Discharge: HOME | End: 2020-06-12
Attending: INTERNAL MEDICINE
Payer: COMMERCIAL

## 2020-06-12 DIAGNOSIS — I63.512 ACUTE ISCHEMIC LEFT MCA STROKE (HCC): ICD-10-CM

## 2020-06-12 PROCEDURE — 85610 PROTHROMBIN TIME: CPT | Performed by: INTERNAL MEDICINE

## 2020-06-24 ENCOUNTER — HOSPITAL ENCOUNTER (EMERGENCY)
Facility: HOSPITAL | Age: 40
Discharge: HOME | End: 2020-06-24
Attending: EMERGENCY MEDICINE
Payer: COMMERCIAL

## 2020-06-24 ENCOUNTER — APPOINTMENT (OUTPATIENT)
Dept: CT IMAGING | Facility: HOSPITAL | Age: 40
End: 2020-06-24
Attending: EMERGENCY MEDICINE
Payer: COMMERCIAL

## 2020-06-24 VITALS
RESPIRATION RATE: 18 BRPM | OXYGEN SATURATION: 100 % | BODY MASS INDEX: 31 KG/M2 | WEIGHT: 190 LBS | TEMPERATURE: 100 F | DIASTOLIC BLOOD PRESSURE: 86 MMHG | SYSTOLIC BLOOD PRESSURE: 122 MMHG | HEART RATE: 103 BPM

## 2020-06-24 DIAGNOSIS — N39.0 URINARY TRACT INFECTION WITHOUT HEMATURIA, SITE UNSPECIFIED: ICD-10-CM

## 2020-06-24 DIAGNOSIS — R10.9 ABDOMINAL PAIN OF UNKNOWN ETIOLOGY: Primary | ICD-10-CM

## 2020-06-24 PROCEDURE — 80053 COMPREHEN METABOLIC PANEL: CPT | Performed by: EMERGENCY MEDICINE

## 2020-06-24 PROCEDURE — 84703 CHORIONIC GONADOTROPIN ASSAY: CPT | Performed by: EMERGENCY MEDICINE

## 2020-06-24 PROCEDURE — 96375 TX/PRO/DX INJ NEW DRUG ADDON: CPT

## 2020-06-24 PROCEDURE — 96374 THER/PROPH/DIAG INJ IV PUSH: CPT

## 2020-06-24 PROCEDURE — 81025 URINE PREGNANCY TEST: CPT

## 2020-06-24 PROCEDURE — 81001 URINALYSIS AUTO W/SCOPE: CPT | Performed by: EMERGENCY MEDICINE

## 2020-06-24 PROCEDURE — 74177 CT ABD & PELVIS W/CONTRAST: CPT | Performed by: EMERGENCY MEDICINE

## 2020-06-24 PROCEDURE — 87147 CULTURE TYPE IMMUNOLOGIC: CPT | Performed by: EMERGENCY MEDICINE

## 2020-06-24 PROCEDURE — 85025 COMPLETE CBC W/AUTO DIFF WBC: CPT | Performed by: EMERGENCY MEDICINE

## 2020-06-24 PROCEDURE — 99284 EMERGENCY DEPT VISIT MOD MDM: CPT

## 2020-06-24 PROCEDURE — 83690 ASSAY OF LIPASE: CPT | Performed by: EMERGENCY MEDICINE

## 2020-06-24 PROCEDURE — 96361 HYDRATE IV INFUSION ADD-ON: CPT

## 2020-06-24 PROCEDURE — 87086 URINE CULTURE/COLONY COUNT: CPT | Performed by: EMERGENCY MEDICINE

## 2020-06-24 PROCEDURE — 85610 PROTHROMBIN TIME: CPT | Performed by: EMERGENCY MEDICINE

## 2020-06-24 RX ORDER — HYDROCODONE BITARTRATE AND ACETAMINOPHEN 5; 325 MG/1; MG/1
2 TABLET ORAL ONCE
Status: COMPLETED | OUTPATIENT
Start: 2020-06-24 | End: 2020-06-24

## 2020-06-24 RX ORDER — NITROFURANTOIN 25; 75 MG/1; MG/1
100 CAPSULE ORAL 2 TIMES DAILY
Qty: 6 CAPSULE | Refills: 0 | Status: SHIPPED | OUTPATIENT
Start: 2020-06-24 | End: 2020-06-27

## 2020-06-24 RX ORDER — HYDROCODONE BITARTRATE AND ACETAMINOPHEN 5; 325 MG/1; MG/1
1-2 TABLET ORAL EVERY 6 HOURS PRN
Qty: 20 TABLET | Refills: 0 | Status: SHIPPED | OUTPATIENT
Start: 2020-06-24 | End: 2020-06-27

## 2020-06-24 RX ORDER — PREDNISONE 10 MG/1
20 TABLET ORAL DAILY
Qty: 10 TABLET | Refills: 0 | Status: SHIPPED | OUTPATIENT
Start: 2020-06-24 | End: 2020-06-29 | Stop reason: WASHOUT

## 2020-06-24 RX ORDER — ONDANSETRON 2 MG/ML
4 INJECTION INTRAMUSCULAR; INTRAVENOUS ONCE
Status: COMPLETED | OUTPATIENT
Start: 2020-06-24 | End: 2020-06-24

## 2020-06-24 RX ORDER — HYDROMORPHONE HYDROCHLORIDE 1 MG/ML
1 INJECTION, SOLUTION INTRAMUSCULAR; INTRAVENOUS; SUBCUTANEOUS EVERY 30 MIN PRN
Status: DISCONTINUED | OUTPATIENT
Start: 2020-06-24 | End: 2020-06-24

## 2020-06-24 RX ORDER — POTASSIUM CHLORIDE 20 MEQ/1
40 TABLET, EXTENDED RELEASE ORAL ONCE
Status: COMPLETED | OUTPATIENT
Start: 2020-06-24 | End: 2020-06-24

## 2020-06-24 NOTE — ED PROVIDER NOTES
Patient Seen in: [facility] Emergency Department      History   Patient presents with:  Abdomen/Flank Pain    Stated Complaint: Sent by GI to r/o pancreatitis.      HPI    Patient is a 44-year-old female history of ulcerative colitis comes to [facility] by GI to r/o pancreatitis. Other systems are as noted in HPI. Constitutional and vital signs reviewed. All other systems reviewed and negative except as noted above.     Physical Exam     ED Triage Vitals   BP 06/24/20 1149 (!) 147/103   Pulse 06/24 following components:    PT 29.6 (*)     INR 2.73 (*)     All other components within normal limits   COMP METABOLIC PANEL (14) - Abnormal; Notable for the following components:    Potassium 3.0 (*)      (*)     ALT 72 (*)     Alkaline Phosphatase 1 significant focal lesion. BILIARY:  No visible dilatation or calcification. PANCREAS:  No lesion, fluid collection, ductal dilatation, or atrophy. SPLEEN:  No enlargement or focal lesion. KIDNEYS:  No mass, obstruction, or calcification.   10 mm benign or was no longer present. There was no indication for further evaluation, treatment or admission on an emergency basis. Comprehensive verbal and written discharge and follow-up instructions were provided to help prevent relapse or worsening.   Patient was

## 2020-06-24 NOTE — ED INITIAL ASSESSMENT (HPI)
C/o generalized crampy abd pain for a week,  Better when not eating,  Pain radiates to back,   With nausea and vomiting and diarrhea

## 2020-07-01 ENCOUNTER — HOSPITAL ENCOUNTER (OUTPATIENT)
Dept: MRI IMAGING | Facility: HOSPITAL | Age: 40
Discharge: HOME | End: 2020-07-01
Attending: INTERNAL MEDICINE
Payer: COMMERCIAL

## 2020-07-01 ENCOUNTER — HOSPITAL ENCOUNTER (OUTPATIENT)
Dept: MRI IMAGING | Age: 40
Discharge: HOME | End: 2020-07-01
Attending: INTERNAL MEDICINE
Payer: COMMERCIAL

## 2020-07-01 DIAGNOSIS — R79.89 ELEVATED LFTS: ICD-10-CM

## 2020-07-01 DIAGNOSIS — R10.9 ABDOMINAL PAIN, UNSPECIFIED ABDOMINAL LOCATION: ICD-10-CM

## 2020-07-01 PROCEDURE — 74183 MRI ABD W/O CNTR FLWD CNTR: CPT | Performed by: INTERNAL MEDICINE

## 2020-07-01 PROCEDURE — A9575 INJ GADOTERATE MEGLUMI 0.1ML: HCPCS | Performed by: INTERNAL MEDICINE

## 2020-07-01 PROCEDURE — 76376 3D RENDER W/INTRP POSTPROCES: CPT | Performed by: INTERNAL MEDICINE

## 2020-07-02 ENCOUNTER — HOSPITAL ENCOUNTER (OUTPATIENT)
Facility: HOSPITAL | Age: 40
Setting detail: OBSERVATION
Discharge: HOME | End: 2020-07-05
Attending: HOSPITALIST | Admitting: HOSPITALIST
Payer: COMMERCIAL

## 2020-07-02 ENCOUNTER — LAB ENCOUNTER (OUTPATIENT)
Dept: LAB | Age: 40
End: 2020-07-02
Attending: INTERNAL MEDICINE
Payer: COMMERCIAL

## 2020-07-02 DIAGNOSIS — K80.20 CHOLELITHIASES: ICD-10-CM

## 2020-07-02 DIAGNOSIS — I63.512 ACUTE ISCHEMIC LEFT MCA STROKE (HCC): ICD-10-CM

## 2020-07-02 PROBLEM — R10.9 ABDOMINAL PAIN: Status: ACTIVE | Noted: 2020-07-02

## 2020-07-02 LAB
INR BLD: 1.58 (ref 0.88–1.11)
PSA SERPL DL<=0.01 NG/ML-MCNC: 18.7 SECONDS (ref 12–14.3)

## 2020-07-02 PROCEDURE — 36415 COLL VENOUS BLD VENIPUNCTURE: CPT

## 2020-07-02 PROCEDURE — 85610 PROTHROMBIN TIME: CPT

## 2020-07-02 RX ORDER — SODIUM CHLORIDE 9 MG/ML
INJECTION, SOLUTION INTRAVENOUS CONTINUOUS
Status: DISCONTINUED | OUTPATIENT
Start: 2020-07-02 | End: 2020-07-03 | Stop reason: HOSPADM

## 2020-07-02 RX ORDER — ASPIRIN 81 MG/1
81 TABLET, CHEWABLE ORAL DAILY
Status: DISCONTINUED | OUTPATIENT
Start: 2020-07-02 | End: 2020-07-03

## 2020-07-02 RX ORDER — WARFARIN SODIUM 7.5 MG/1
7.5 TABLET ORAL NIGHTLY
Status: DISCONTINUED | OUTPATIENT
Start: 2020-07-03 | End: 2020-07-03

## 2020-07-02 RX ORDER — HYDROMORPHONE HYDROCHLORIDE 1 MG/ML
0.5 INJECTION, SOLUTION INTRAMUSCULAR; INTRAVENOUS; SUBCUTANEOUS EVERY 2 HOUR PRN
Status: DISCONTINUED | OUTPATIENT
Start: 2020-07-02 | End: 2020-07-03 | Stop reason: HOSPADM

## 2020-07-02 RX ORDER — ATORVASTATIN CALCIUM 40 MG/1
40 TABLET, FILM COATED ORAL NIGHTLY
Status: DISCONTINUED | OUTPATIENT
Start: 2020-07-02 | End: 2020-07-05

## 2020-07-02 RX ORDER — ENOXAPARIN SODIUM 100 MG/ML
40 INJECTION SUBCUTANEOUS DAILY
Status: DISCONTINUED | OUTPATIENT
Start: 2020-07-02 | End: 2020-07-03

## 2020-07-02 RX ORDER — BALSALAZIDE DISODIUM 750 MG/1
2250 CAPSULE ORAL 3 TIMES DAILY
Status: DISCONTINUED | OUTPATIENT
Start: 2020-07-02 | End: 2020-07-05

## 2020-07-02 RX ORDER — WARFARIN SODIUM 5 MG/1
10 TABLET ORAL ONCE
Status: COMPLETED | OUTPATIENT
Start: 2020-07-02 | End: 2020-07-02

## 2020-07-02 RX ORDER — ALBUTEROL SULFATE 2.5 MG/3ML
3 SOLUTION RESPIRATORY (INHALATION) EVERY 4 HOURS PRN
Status: DISCONTINUED | OUTPATIENT
Start: 2020-07-02 | End: 2020-07-05

## 2020-07-03 ENCOUNTER — ANESTHESIA (OUTPATIENT)
Dept: SURGERY | Facility: HOSPITAL | Age: 40
End: 2020-07-03
Payer: COMMERCIAL

## 2020-07-03 ENCOUNTER — ANESTHESIA EVENT (OUTPATIENT)
Dept: SURGERY | Facility: HOSPITAL | Age: 40
End: 2020-07-03
Payer: COMMERCIAL

## 2020-07-03 LAB
ALBUMIN SERPL-MCNC: 2.9 G/DL (ref 3.4–5)
ALBUMIN/GLOB SERPL: 0.7 {RATIO} (ref 1–2)
ALP LIVER SERPL-CCNC: 283 U/L (ref 37–98)
ALT SERPL-CCNC: 72 U/L (ref 13–56)
ANION GAP SERPL CALC-SCNC: 4 MMOL/L (ref 0–18)
AST SERPL-CCNC: 169 U/L (ref 15–37)
BASOPHILS # BLD AUTO: 0.04 X10(3) UL (ref 0–0.2)
BASOPHILS NFR BLD AUTO: 0.4 %
BILIRUB SERPL-MCNC: 0.9 MG/DL (ref 0.1–2)
BUN BLD-MCNC: 5 MG/DL (ref 7–18)
BUN/CREAT SERPL: 8.2 (ref 10–20)
CALCIUM BLD-MCNC: 8.2 MG/DL (ref 8.5–10.1)
CHLORIDE SERPL-SCNC: 108 MMOL/L (ref 98–112)
CO2 SERPL-SCNC: 28 MMOL/L (ref 21–32)
CREAT BLD-MCNC: 0.61 MG/DL (ref 0.55–1.02)
DEPRECATED RDW RBC AUTO: 44.9 FL (ref 35.1–46.3)
EOSINOPHIL # BLD AUTO: 0.18 X10(3) UL (ref 0–0.7)
EOSINOPHIL NFR BLD AUTO: 1.9 %
ERYTHROCYTE [DISTWIDTH] IN BLOOD BY AUTOMATED COUNT: 13.1 % (ref 11–15)
GLOBULIN PLAS-MCNC: 4.1 G/DL (ref 2.8–4.4)
GLUCOSE BLD-MCNC: 72 MG/DL (ref 70–99)
HCT VFR BLD AUTO: 36.6 % (ref 35–48)
HGB BLD-MCNC: 11.1 G/DL (ref 12–16)
IMM GRANULOCYTES # BLD AUTO: 0.07 X10(3) UL (ref 0–1)
IMM GRANULOCYTES NFR BLD: 0.7 %
INR BLD: 1.49 (ref 0.89–1.11)
LYMPHOCYTES # BLD AUTO: 2.14 X10(3) UL (ref 1–4)
LYMPHOCYTES NFR BLD AUTO: 22.6 %
M PROTEIN MFR SERPL ELPH: 7 G/DL (ref 6.4–8.2)
MCH RBC QN AUTO: 28.9 PG (ref 26–34)
MCHC RBC AUTO-ENTMCNC: 30.3 G/DL (ref 31–37)
MCV RBC AUTO: 95.3 FL (ref 80–100)
MONOCYTES # BLD AUTO: 0.93 X10(3) UL (ref 0.1–1)
MONOCYTES NFR BLD AUTO: 9.8 %
NEUTROPHILS # BLD AUTO: 6.11 X10 (3) UL (ref 1.5–7.7)
NEUTROPHILS # BLD AUTO: 6.11 X10(3) UL (ref 1.5–7.7)
NEUTROPHILS NFR BLD AUTO: 64.6 %
OSMOLALITY SERPL CALC.SUM OF ELEC: 286 MOSM/KG (ref 275–295)
PLATELET # BLD AUTO: 347 10(3)UL (ref 150–450)
POTASSIUM SERPL-SCNC: 3.1 MMOL/L (ref 3.5–5.1)
POTASSIUM SERPL-SCNC: 4.1 MMOL/L (ref 3.5–5.1)
PSA SERPL DL<=0.01 NG/ML-MCNC: 18.4 SECONDS (ref 12.4–14.6)
RBC # BLD AUTO: 3.84 X10(6)UL (ref 3.8–5.3)
SARS-COV-2 RNA RESP QL NAA+PROBE: NOT DETECTED
SODIUM SERPL-SCNC: 140 MMOL/L (ref 136–145)
WBC # BLD AUTO: 9.5 X10(3) UL (ref 4–11)

## 2020-07-03 PROCEDURE — 85025 COMPLETE CBC W/AUTO DIFF WBC: CPT | Performed by: HOSPITALIST

## 2020-07-03 PROCEDURE — 0FT44ZZ RESECTION OF GALLBLADDER, PERCUTANEOUS ENDOSCOPIC APPROACH: ICD-10-PCS | Performed by: SURGERY

## 2020-07-03 PROCEDURE — 80053 COMPREHEN METABOLIC PANEL: CPT | Performed by: HOSPITALIST

## 2020-07-03 PROCEDURE — 85610 PROTHROMBIN TIME: CPT | Performed by: HOSPITALIST

## 2020-07-03 PROCEDURE — 88304 TISSUE EXAM BY PATHOLOGIST: CPT | Performed by: SURGERY

## 2020-07-03 PROCEDURE — 84132 ASSAY OF SERUM POTASSIUM: CPT | Performed by: HOSPITALIST

## 2020-07-03 RX ORDER — HYDROCODONE BITARTRATE AND ACETAMINOPHEN 5; 325 MG/1; MG/1
2 TABLET ORAL EVERY 4 HOURS PRN
Status: DISCONTINUED | OUTPATIENT
Start: 2020-07-03 | End: 2020-07-05

## 2020-07-03 RX ORDER — METOCLOPRAMIDE 10 MG/1
10 TABLET ORAL EVERY 6 HOURS PRN
Status: DISCONTINUED | OUTPATIENT
Start: 2020-07-03 | End: 2020-07-03 | Stop reason: HOSPADM

## 2020-07-03 RX ORDER — ONDANSETRON 2 MG/ML
4 INJECTION INTRAMUSCULAR; INTRAVENOUS EVERY 6 HOURS PRN
Status: DISCONTINUED | OUTPATIENT
Start: 2020-07-03 | End: 2020-07-05

## 2020-07-03 RX ORDER — LIDOCAINE HYDROCHLORIDE 10 MG/ML
INJECTION, SOLUTION EPIDURAL; INFILTRATION; INTRACAUDAL; PERINEURAL AS NEEDED
Status: DISCONTINUED | OUTPATIENT
Start: 2020-07-03 | End: 2020-07-03 | Stop reason: SURG

## 2020-07-03 RX ORDER — ONDANSETRON 2 MG/ML
INJECTION INTRAMUSCULAR; INTRAVENOUS
Status: COMPLETED
Start: 2020-07-03 | End: 2020-07-03

## 2020-07-03 RX ORDER — ONDANSETRON 2 MG/ML
4 INJECTION INTRAMUSCULAR; INTRAVENOUS AS NEEDED
Status: DISCONTINUED | OUTPATIENT
Start: 2020-07-03 | End: 2020-07-03 | Stop reason: HOSPADM

## 2020-07-03 RX ORDER — ESMOLOL HYDROCHLORIDE 10 MG/ML
INJECTION INTRAVENOUS AS NEEDED
Status: DISCONTINUED | OUTPATIENT
Start: 2020-07-03 | End: 2020-07-03 | Stop reason: SURG

## 2020-07-03 RX ORDER — MIDAZOLAM HYDROCHLORIDE 1 MG/ML
1 INJECTION INTRAMUSCULAR; INTRAVENOUS EVERY 5 MIN PRN
Status: DISCONTINUED | OUTPATIENT
Start: 2020-07-03 | End: 2020-07-03 | Stop reason: HOSPADM

## 2020-07-03 RX ORDER — BUPIVACAINE HYDROCHLORIDE AND EPINEPHRINE 5; 5 MG/ML; UG/ML
INJECTION, SOLUTION EPIDURAL; INTRACAUDAL; PERINEURAL AS NEEDED
Status: DISCONTINUED | OUTPATIENT
Start: 2020-07-03 | End: 2020-07-03 | Stop reason: HOSPADM

## 2020-07-03 RX ORDER — DEXAMETHASONE SODIUM PHOSPHATE 4 MG/ML
VIAL (ML) INJECTION
Status: DISCONTINUED
Start: 2020-07-03 | End: 2020-07-03 | Stop reason: WASHOUT

## 2020-07-03 RX ORDER — DEXTROSE, SODIUM CHLORIDE, AND POTASSIUM CHLORIDE 5; .45; .15 G/100ML; G/100ML; G/100ML
INJECTION INTRAVENOUS
Status: COMPLETED
Start: 2020-07-03 | End: 2020-07-03

## 2020-07-03 RX ORDER — ONDANSETRON 2 MG/ML
4 INJECTION INTRAMUSCULAR; INTRAVENOUS EVERY 6 HOURS PRN
Status: DISCONTINUED | OUTPATIENT
Start: 2020-07-03 | End: 2020-07-03 | Stop reason: HOSPADM

## 2020-07-03 RX ORDER — HYDROCODONE BITARTRATE AND ACETAMINOPHEN 5; 325 MG/1; MG/1
2 TABLET ORAL AS NEEDED
Status: DISCONTINUED | OUTPATIENT
Start: 2020-07-03 | End: 2020-07-03 | Stop reason: HOSPADM

## 2020-07-03 RX ORDER — DEXTROSE, SODIUM CHLORIDE, AND POTASSIUM CHLORIDE 5; .45; .15 G/100ML; G/100ML; G/100ML
INJECTION INTRAVENOUS CONTINUOUS
Status: DISCONTINUED | OUTPATIENT
Start: 2020-07-03 | End: 2020-07-05

## 2020-07-03 RX ORDER — LABETALOL HYDROCHLORIDE 5 MG/ML
5 INJECTION, SOLUTION INTRAVENOUS EVERY 5 MIN PRN
Status: DISCONTINUED | OUTPATIENT
Start: 2020-07-03 | End: 2020-07-03 | Stop reason: HOSPADM

## 2020-07-03 RX ORDER — HYDROMORPHONE HYDROCHLORIDE 1 MG/ML
0.8 INJECTION, SOLUTION INTRAMUSCULAR; INTRAVENOUS; SUBCUTANEOUS EVERY 2 HOUR PRN
Status: DISCONTINUED | OUTPATIENT
Start: 2020-07-03 | End: 2020-07-05

## 2020-07-03 RX ORDER — HYDROMORPHONE HYDROCHLORIDE 1 MG/ML
INJECTION, SOLUTION INTRAMUSCULAR; INTRAVENOUS; SUBCUTANEOUS
Status: COMPLETED
Start: 2020-07-03 | End: 2020-07-03

## 2020-07-03 RX ORDER — HYDROMORPHONE HYDROCHLORIDE 1 MG/ML
0.4 INJECTION, SOLUTION INTRAMUSCULAR; INTRAVENOUS; SUBCUTANEOUS EVERY 2 HOUR PRN
Status: DISCONTINUED | OUTPATIENT
Start: 2020-07-03 | End: 2020-07-05

## 2020-07-03 RX ORDER — DEXAMETHASONE SODIUM PHOSPHATE 4 MG/ML
4 VIAL (ML) INJECTION AS NEEDED
Status: DISCONTINUED | OUTPATIENT
Start: 2020-07-03 | End: 2020-07-03 | Stop reason: HOSPADM

## 2020-07-03 RX ORDER — HYDROCODONE BITARTRATE AND ACETAMINOPHEN 5; 325 MG/1; MG/1
1 TABLET ORAL EVERY 4 HOURS PRN
Status: DISCONTINUED | OUTPATIENT
Start: 2020-07-03 | End: 2020-07-05

## 2020-07-03 RX ORDER — MEPERIDINE HYDROCHLORIDE 25 MG/ML
INJECTION INTRAMUSCULAR; INTRAVENOUS; SUBCUTANEOUS
Status: COMPLETED
Start: 2020-07-03 | End: 2020-07-03

## 2020-07-03 RX ORDER — MIDAZOLAM HYDROCHLORIDE 1 MG/ML
INJECTION INTRAMUSCULAR; INTRAVENOUS AS NEEDED
Status: DISCONTINUED | OUTPATIENT
Start: 2020-07-03 | End: 2020-07-03 | Stop reason: SURG

## 2020-07-03 RX ORDER — POTASSIUM CHLORIDE 14.9 MG/ML
20 INJECTION INTRAVENOUS ONCE
Status: DISCONTINUED | OUTPATIENT
Start: 2020-07-03 | End: 2020-07-03 | Stop reason: HOSPADM

## 2020-07-03 RX ORDER — METOCLOPRAMIDE HYDROCHLORIDE 5 MG/ML
10 INJECTION INTRAMUSCULAR; INTRAVENOUS EVERY 6 HOURS PRN
Status: DISCONTINUED | OUTPATIENT
Start: 2020-07-03 | End: 2020-07-03 | Stop reason: HOSPADM

## 2020-07-03 RX ORDER — HYDROCODONE BITARTRATE AND ACETAMINOPHEN 5; 325 MG/1; MG/1
1 TABLET ORAL AS NEEDED
Status: DISCONTINUED | OUTPATIENT
Start: 2020-07-03 | End: 2020-07-03 | Stop reason: HOSPADM

## 2020-07-03 RX ORDER — HYDROMORPHONE HYDROCHLORIDE 1 MG/ML
0.4 INJECTION, SOLUTION INTRAMUSCULAR; INTRAVENOUS; SUBCUTANEOUS EVERY 5 MIN PRN
Status: DISCONTINUED | OUTPATIENT
Start: 2020-07-03 | End: 2020-07-03 | Stop reason: HOSPADM

## 2020-07-03 RX ORDER — HYDROMORPHONE HYDROCHLORIDE 1 MG/ML
1.2 INJECTION, SOLUTION INTRAMUSCULAR; INTRAVENOUS; SUBCUTANEOUS EVERY 2 HOUR PRN
Status: DISCONTINUED | OUTPATIENT
Start: 2020-07-03 | End: 2020-07-05

## 2020-07-03 RX ORDER — ROCURONIUM BROMIDE 10 MG/ML
INJECTION, SOLUTION INTRAVENOUS AS NEEDED
Status: DISCONTINUED | OUTPATIENT
Start: 2020-07-03 | End: 2020-07-03 | Stop reason: SURG

## 2020-07-03 RX ORDER — KETAMINE HYDROCHLORIDE 50 MG/ML
INJECTION, SOLUTION, CONCENTRATE INTRAMUSCULAR; INTRAVENOUS AS NEEDED
Status: DISCONTINUED | OUTPATIENT
Start: 2020-07-03 | End: 2020-07-03 | Stop reason: SURG

## 2020-07-03 RX ORDER — DEXAMETHASONE SODIUM PHOSPHATE 4 MG/ML
VIAL (ML) INJECTION
Status: COMPLETED
Start: 2020-07-03 | End: 2020-07-03

## 2020-07-03 RX ORDER — DEXAMETHASONE SODIUM PHOSPHATE 4 MG/ML
VIAL (ML) INJECTION AS NEEDED
Status: DISCONTINUED | OUTPATIENT
Start: 2020-07-03 | End: 2020-07-03 | Stop reason: SURG

## 2020-07-03 RX ORDER — METOCLOPRAMIDE HYDROCHLORIDE 5 MG/ML
10 INJECTION INTRAMUSCULAR; INTRAVENOUS EVERY 6 HOURS PRN
Status: DISCONTINUED | OUTPATIENT
Start: 2020-07-03 | End: 2020-07-05

## 2020-07-03 RX ORDER — DIPHENHYDRAMINE HYDROCHLORIDE 50 MG/ML
12.5 INJECTION INTRAMUSCULAR; INTRAVENOUS AS NEEDED
Status: DISCONTINUED | OUTPATIENT
Start: 2020-07-03 | End: 2020-07-03 | Stop reason: HOSPADM

## 2020-07-03 RX ORDER — MEPERIDINE HYDROCHLORIDE 25 MG/ML
12.5 INJECTION INTRAMUSCULAR; INTRAVENOUS; SUBCUTANEOUS AS NEEDED
Status: DISCONTINUED | OUTPATIENT
Start: 2020-07-03 | End: 2020-07-03 | Stop reason: HOSPADM

## 2020-07-03 RX ORDER — PHENYLEPHRINE HCL 10 MG/ML
VIAL (ML) INJECTION AS NEEDED
Status: DISCONTINUED | OUTPATIENT
Start: 2020-07-03 | End: 2020-07-03 | Stop reason: SURG

## 2020-07-03 RX ORDER — ENOXAPARIN SODIUM 100 MG/ML
40 INJECTION SUBCUTANEOUS DAILY
Status: DISCONTINUED | OUTPATIENT
Start: 2020-07-03 | End: 2020-07-04

## 2020-07-03 RX ORDER — SODIUM CHLORIDE, SODIUM LACTATE, POTASSIUM CHLORIDE, CALCIUM CHLORIDE 600; 310; 30; 20 MG/100ML; MG/100ML; MG/100ML; MG/100ML
INJECTION, SOLUTION INTRAVENOUS CONTINUOUS
Status: DISCONTINUED | OUTPATIENT
Start: 2020-07-03 | End: 2020-07-03 | Stop reason: HOSPADM

## 2020-07-03 RX ORDER — NALOXONE HYDROCHLORIDE 0.4 MG/ML
80 INJECTION, SOLUTION INTRAMUSCULAR; INTRAVENOUS; SUBCUTANEOUS AS NEEDED
Status: DISCONTINUED | OUTPATIENT
Start: 2020-07-03 | End: 2020-07-03 | Stop reason: HOSPADM

## 2020-07-03 RX ADMIN — ESMOLOL HYDROCHLORIDE 30 MG: 10 INJECTION INTRAVENOUS at 13:37:00

## 2020-07-03 RX ADMIN — ROCURONIUM BROMIDE 50 MG: 10 INJECTION, SOLUTION INTRAVENOUS at 13:29:00

## 2020-07-03 RX ADMIN — ROCURONIUM BROMIDE 10 MG: 10 INJECTION, SOLUTION INTRAVENOUS at 13:43:00

## 2020-07-03 RX ADMIN — KETAMINE HYDROCHLORIDE 25 MG: 50 INJECTION, SOLUTION, CONCENTRATE INTRAMUSCULAR; INTRAVENOUS at 13:29:00

## 2020-07-03 RX ADMIN — MIDAZOLAM HYDROCHLORIDE 2 MG: 1 INJECTION INTRAMUSCULAR; INTRAVENOUS at 13:25:00

## 2020-07-03 RX ADMIN — ESMOLOL HYDROCHLORIDE 20 MG: 10 INJECTION INTRAVENOUS at 13:47:00

## 2020-07-03 RX ADMIN — ONDANSETRON 4 MG: 2 INJECTION INTRAMUSCULAR; INTRAVENOUS at 14:02:00

## 2020-07-03 RX ADMIN — PHENYLEPHRINE HCL 100 MCG: 10 MG/ML VIAL (ML) INJECTION at 13:51:00

## 2020-07-03 RX ADMIN — LIDOCAINE HYDROCHLORIDE 50 MG: 10 INJECTION, SOLUTION EPIDURAL; INFILTRATION; INTRACAUDAL; PERINEURAL at 13:29:00

## 2020-07-03 RX ADMIN — DEXAMETHASONE SODIUM PHOSPHATE 8 MG: 4 MG/ML VIAL (ML) INJECTION at 13:34:00

## 2020-07-03 NOTE — PROGRESS NOTES
Pt states she has completed 2 months prednisone taper dose.     States order for prednisone is now completed

## 2020-07-03 NOTE — DIETARY NOTE
[address] [place] Noel Rogel     Admitting diagnosis:  abdominal pain    Ht:  66\"  Wt:  . BMI: There is no height or weight on file to calculate BMI.   IBW: Patient weight not recorded     Wt Readings from Last 6 Encounters:  07/

## 2020-07-03 NOTE — CONSULTS
[address] [facility] Patient Status:  Observation    1980 MRN [id number]   [facility] 3NW-A Attending Yvette Williamson,*   Hosp Day # 0 PCP Palma Razo MD       Radha Fish is a 35year old female for epiga DILATION/CURETTAGE,DIAGNOSTIC      2009   • VIKA LOCALIZATION WIRE 1 SITE RIGHT (AAE=18155) Left 9/2010, 2/11    breast abcess   • OTHER  1998    laproscopic for endometriosis      Social History    Tobacco Use      Smoking status: Former Smoker        Pack for 2 weeks. Pain is sharp constant not related to meals and does radiate across abd. Has UC recent flare 3 months ago started Prednisone Aza. Now with abn LFTs as well. MRI MRCP GB sludge no GB stones. Abn LFTs Aza related?     CT with colitis recentl

## 2020-07-03 NOTE — OPERATIVE REPORT
Report of Operation    Christy Pair Patient Status:  Observation    1980 MRN [id number]   [facility] SURGERY Attending Neville Yeh Prom Day # Gallbladder fossa and right gutter was irrigated until clear and hemostasis was achieved. The pneumoperitoneum was decompressed with suction and all ports removed. The umbilical fascia was closed with 0 Vicryl.  All skin incisions were closed with 4-0 Vicry

## 2020-07-03 NOTE — ANESTHESIA PREPROCEDURE EVALUATION
PRE-OP EVALUATION    Patient Name: Kiya Snider    Pre-op Diagnosis: Cholelithiases [K80.20]    Procedure(s):  LAPAROSCOPIC CHOLECYSTECTOMY    Surgeon(s) and Role:     Valdez Wilcox MD - Primary    Pre-op vitals reviewed.   Temp: 98.6 °F (37 °C)  Pulse: 8 mg total) by mouth 3 (three) times daily as needed. , Disp: 90 tablet, Rfl: 3  Butalbital-APAP-Caffeine -40 MG Oral Tab, Take 1 tablet by mouth every 6 (six) hours as needed for Headaches., Disp: 30 tablet, Rfl: 0  aspirin 325 MG Oral Tab, Take 1 tabl HCT 36.6 07/03/2020    HCT 36.2 07/02/2020    MCV 95.3 07/03/2020    MCV 89.2 07/02/2020    MCH 28.9 07/03/2020    MCH 29.1 07/02/2020    MCHC 30.3 (L) 07/03/2020    MCHC 32.6 07/02/2020    RDW 13.1 07/03/2020    RDW 13.1 07/02/2020    .0 07/03/2020

## 2020-07-03 NOTE — PROGRESS NOTES
Dr Clover Little paged to notify of new consult  Dr Dean Mariscal paged to confirm prednisone orders & post op cld orders. Awaiting return call.

## 2020-07-03 NOTE — PROGRESS NOTES
Patient alert and oriented x4. Admitted directly from immediate care in [place], patient drove herself to the hospital.  States she went there for abdominal pain. Recent scans show gallbladder sludge.   Patient reports dull, crampy lower abdominal pain that

## 2020-07-03 NOTE — H&P
[facility] Hospitalist History and Physical      CC: Abd pain    PCP: Brenton Jeronimo MD    History of Present Illness: Patient is a 35year old female with PMH sig for factor V Leiden with hx of CVA and UC here with abd pain.  She was been following with GAURAVG (100 mg total) by mouth daily. , Disp: 30 tablet, Rfl: 6  HUMIRA PEN 40 MG/0.4ML Subcutaneous Pen-injector Kit, Inject 40 mg into the skin every 14 (fourteen) days. , Disp: 2 each, Rfl: 5  Warfarin Sodium 5 MG Oral Tab, Take 2 tablets (10 mg total) by mouth • Breast Cancer Paternal Grandmother 36   • Lipids Mother         Hyperlipidemia   • Other (Sarcoidosis) Mother    • Arthritis Other         Rheumatiod, sibling, family history of   • Diabetes Other         sibling, family history of    • Colon Cancer Ma Assessment/Plan:   Principal Problem:    Abdominal pain      # Abd pain  - Seen by generally surgery - recommend she have her GB removed  - Plan for lap aimee today  - INR is 1.45    # Transaminitis  - No evidence of stone on MRCP or US  - Trend post

## 2020-07-03 NOTE — PLAN OF CARE
Pt a&o x4. Cheerful & cooperative w/ care. H/o cva w/ expressive aphasia. Delayed response, at times.   Able to make all needs known w/out difficulty  C/o nausea, zofran given w/ relief  C/o pain, dilaudid given w/ relief  K = 3.1.  replaced per protoc

## 2020-07-03 NOTE — CONSULTS
[facility]  Report of Consultation    Alfredo Kinney Patient Status:  Observation    1980 MRN [id number]   [facility] 3NW-A Attending Freddy Gomez,*   Hosp Day # 0 PCP Mary Carmen Whiteside MD     Reason for Consultation: Rheumatiod, sibling, family history of   • Diabetes Other         sibling, family history of    • Colon Cancer Maternal Grandmother    • Diabetes Maternal Grandmother    • Heart Attack Maternal Grandmother    • Heart Surgery Maternal Grandmother    • O Disp: 30 tablet, Rfl: 2  predniSONE 10 MG Oral Tab, Take 4 tabs (10 mg) by mouth once daily for 2 weeks.  Then decrease by 1 tab (10 mg) every 2 weeks until finished., Disp: 140 tablet, Rfl: 0  azathioprine 100 MG Oral Tab, Take 1 tablet (100 mg total) by m no lymphadenopathy    EXTREMITIES: no cyanosis, clubbing or edema    .     Laboratory Data:  Recent Labs   Lab 06/29/20  1237 07/02/20  0850 07/02/20  1544 07/03/20  0529   WBC  --   --  7.25 9.5   HGB  --   --  11.8* 11.1*   MCV  --   --  89.2 95.3   PLT PELVIS IV CONTRAST, NO ORAL (ER), 6/24/2020, 1:40 PM.  INDICATIONS:  R10.9 Unspecified abdominal pain R79.89 Other specified abnormal findings of blood chemistry  TECHNIQUE:  Multiplanar single shot fast spin echo, chemical shift imaging, breath hold T2 we on 7/02/2020 at 7:35 AM          Ct Abdomen Pelvis Iv Contrast, No Oral (er)    Result Date: 6/24/2020  PROCEDURE:  CT ABDOMEN PELVIS IV CONTRAST, NO ORAL (ER)  COMPARISON:  [facility], CT, ENTEROGRAPHY ABD/PLV C/CONT, 2/14/2013, 9:01 PM.  INDICATIONS:  Se Problem List:    Patient Active Problem List:     Asthma     Endometriosis of uterus     Menstrual migraine, not intractable, without status migrainosus     Ulcerative colitis ([facility]     Hypokalemia     Acute ischemic left MCA stroke ([facility]     Disorient

## 2020-07-03 NOTE — ANESTHESIA POSTPROCEDURE EVALUATION
[facility] Patient Status:  Observation   Age/Gender 35year old female MRN [id number]   Location [address] [facility] Attending Arie Mckeon,*   Hosp Day # 0 PCP Ginny Bryant MD       Anesthesia

## 2020-07-04 LAB
ALBUMIN SERPL-MCNC: 2.9 G/DL (ref 3.4–5)
ALBUMIN/GLOB SERPL: 0.7 {RATIO} (ref 1–2)
ALP LIVER SERPL-CCNC: 263 U/L (ref 37–98)
ALT SERPL-CCNC: 69 U/L (ref 13–56)
ANION GAP SERPL CALC-SCNC: 4 MMOL/L (ref 0–18)
AST SERPL-CCNC: 166 U/L (ref 15–37)
BASOPHILS # BLD AUTO: 0.02 X10(3) UL (ref 0–0.2)
BASOPHILS NFR BLD AUTO: 0.1 %
BILIRUB SERPL-MCNC: 0.4 MG/DL (ref 0.1–2)
BUN BLD-MCNC: 5 MG/DL (ref 7–18)
BUN/CREAT SERPL: 8.5 (ref 10–20)
CALCIUM BLD-MCNC: 8.3 MG/DL (ref 8.5–10.1)
CHLORIDE SERPL-SCNC: 108 MMOL/L (ref 98–112)
CO2 SERPL-SCNC: 27 MMOL/L (ref 21–32)
CREAT BLD-MCNC: 0.59 MG/DL (ref 0.55–1.02)
DEPRECATED RDW RBC AUTO: 41.6 FL (ref 35.1–46.3)
EOSINOPHIL # BLD AUTO: 0 X10(3) UL (ref 0–0.7)
EOSINOPHIL NFR BLD AUTO: 0 %
ERYTHROCYTE [DISTWIDTH] IN BLOOD BY AUTOMATED COUNT: 12.8 % (ref 11–15)
GLOBULIN PLAS-MCNC: 4 G/DL (ref 2.8–4.4)
GLUCOSE BLD-MCNC: 136 MG/DL (ref 70–99)
HCT VFR BLD AUTO: 35.7 % (ref 35–48)
HGB BLD-MCNC: 11.6 G/DL (ref 12–16)
IMM GRANULOCYTES # BLD AUTO: 0.14 X10(3) UL (ref 0–1)
IMM GRANULOCYTES NFR BLD: 1 %
INR BLD: 2.1 (ref 0.89–1.11)
LYMPHOCYTES # BLD AUTO: 1.19 X10(3) UL (ref 1–4)
LYMPHOCYTES NFR BLD AUTO: 8.4 %
M PROTEIN MFR SERPL ELPH: 6.9 G/DL (ref 6.4–8.2)
MCH RBC QN AUTO: 29.6 PG (ref 26–34)
MCHC RBC AUTO-ENTMCNC: 32.5 G/DL (ref 31–37)
MCV RBC AUTO: 91.1 FL (ref 80–100)
MONOCYTES # BLD AUTO: 0.97 X10(3) UL (ref 0.1–1)
MONOCYTES NFR BLD AUTO: 6.9 %
NEUTROPHILS # BLD AUTO: 11.79 X10 (3) UL (ref 1.5–7.7)
NEUTROPHILS # BLD AUTO: 11.79 X10(3) UL (ref 1.5–7.7)
NEUTROPHILS NFR BLD AUTO: 83.6 %
OSMOLALITY SERPL CALC.SUM OF ELEC: 287 MOSM/KG (ref 275–295)
PLATELET # BLD AUTO: 370 10(3)UL (ref 150–450)
POTASSIUM SERPL-SCNC: 3.9 MMOL/L (ref 3.5–5.1)
PSA SERPL DL<=0.01 NG/ML-MCNC: 24.1 SECONDS (ref 12.4–14.6)
RBC # BLD AUTO: 3.92 X10(6)UL (ref 3.8–5.3)
SODIUM SERPL-SCNC: 139 MMOL/L (ref 136–145)
WBC # BLD AUTO: 14.1 X10(3) UL (ref 4–11)

## 2020-07-04 PROCEDURE — 80053 COMPREHEN METABOLIC PANEL: CPT | Performed by: INTERNAL MEDICINE

## 2020-07-04 PROCEDURE — 85025 COMPLETE CBC W/AUTO DIFF WBC: CPT | Performed by: HOSPITALIST

## 2020-07-04 PROCEDURE — 85610 PROTHROMBIN TIME: CPT | Performed by: SURGERY

## 2020-07-04 RX ORDER — WARFARIN SODIUM 5 MG/1
10 TABLET ORAL
Status: DISCONTINUED | OUTPATIENT
Start: 2020-07-04 | End: 2020-07-04

## 2020-07-04 NOTE — PLAN OF CARE
Problem: PAIN - ADULT  Goal: Verbalizes/displays adequate comfort level or patient's stated pain goal  Description  INTERVENTIONS:  - Encourage pt to monitor pain and request assistance  - Assess pain using appropriate pain scale  - Administer analgesics nonpharmacologic comfort measures as appropriate  - Advance diet as tolerated, if ordered  - Obtain nutritional consult as needed  - Evaluate fluid balance  Outcome: Progressing  Goal: Maintains or returns to baseline bowel function  Description  INTERVENT

## 2020-07-04 NOTE — PLAN OF CARE
Pt is oriented x4. VSS. Pt has delayed responses and this is her baseline. Got to 2000 on the IS. Had some nausea today and was given zofran. Pain managed with 1 norco Q4 hours. Pt is slowly advancing diets. Pt tolerated soft diet. IVF infusing.  Lap sites

## 2020-07-04 NOTE — PROGRESS NOTES
[facility] Hospitalist Progress Note                                                                   [address] [facility]  7/30/1980    CC: FU abd pain    Interval History:  - Doing well, pain improved, ALB 4.1 2.9*  --  2.9*    140  --  139   K 3.52 3.1* 4.1 3.9    108  --  108   CO2 26.1 28.0  --  27.0   ALKPHO 320* 283*  --  263*   * 169*  --  166*   ALT 66* 72*  --  69*   BILT 0.66 0.9  --  0.4   TP 8.1 7.0  --  6.9           ROS:

## 2020-07-04 NOTE — PROGRESS NOTES
[facility]  Progress Note    Yanira Schilling Patient Status:  Observation    1980 MRN [id number]   [facility] 3NW-A Attending Eliazar Jaimes,*   Hosp Day # 0 PCP Héctor Harris MD     Subjective:  Yanira Schilling is a(n)

## 2020-07-04 NOTE — PROGRESS NOTES
[facility]  Progress Note    Yaritza Staton Patient Status:  Observation    1980 MRN [id number]   [facility] 3NW-A Attending More Saenz,*   Hosp Day # 0 PCP Myrna Hollingsworth MD     Subjective:    Some nausea this mo

## 2020-07-04 NOTE — PLAN OF CARE
Patient is alert and oriented  Resting comfortably at this time  Abdominal cramping has resolved, patient states she has surgical pain at this time. PRN medication effective.   Lovenox given per order, coumadin on hold  Lungs are clear, bowel sounds hypoac Problem: GASTROINTESTINAL - ADULT  Goal: Minimal or absence of nausea and vomiting  Description  INTERVENTIONS:  - Maintain adequate hydration with IV or PO as ordered and tolerated  - Nasogastric tube to low intermittent suction as ordered  - Evaluate e

## 2020-07-04 NOTE — PROGRESS NOTES
7/4/2020 Pt resting in chair at this time. A+Ox4. RA. I.S. 2000. Tolerating clear liquids. Pt reports some nausea at times. Denies emesis. Voiding freely. IVF infusing. Pain controlled with Norco PRN. All questions answered. Cont to monitor.

## 2020-07-04 NOTE — CONSULTS
[facility] Dosing Service  Warfarin (Coumadin) Initial Dosing    Yessica Hassan is a 35year old patient for whom pharmacy has been consulted to dose warfarin (COUMADIN) for Prophylaxis of venous thrombosis by Dr. Adenike Van.   Based on this indication,

## 2020-07-05 VITALS
DIASTOLIC BLOOD PRESSURE: 73 MMHG | HEART RATE: 74 BPM | RESPIRATION RATE: 18 BRPM | SYSTOLIC BLOOD PRESSURE: 117 MMHG | TEMPERATURE: 98 F | OXYGEN SATURATION: 98 %

## 2020-07-05 LAB
ALBUMIN SERPL-MCNC: 2.6 G/DL (ref 3.4–5)
ALBUMIN/GLOB SERPL: 0.7 {RATIO} (ref 1–2)
ALP LIVER SERPL-CCNC: 221 U/L (ref 37–98)
ALT SERPL-CCNC: 53 U/L (ref 13–56)
ANION GAP SERPL CALC-SCNC: 2 MMOL/L (ref 0–18)
AST SERPL-CCNC: 100 U/L (ref 15–37)
BILIRUB SERPL-MCNC: 0.3 MG/DL (ref 0.1–2)
BUN BLD-MCNC: 8 MG/DL (ref 7–18)
BUN/CREAT SERPL: 12.9 (ref 10–20)
CALCIUM BLD-MCNC: 8 MG/DL (ref 8.5–10.1)
CHLORIDE SERPL-SCNC: 109 MMOL/L (ref 98–112)
CO2 SERPL-SCNC: 29 MMOL/L (ref 21–32)
CREAT BLD-MCNC: 0.62 MG/DL (ref 0.55–1.02)
GLOBULIN PLAS-MCNC: 3.8 G/DL (ref 2.8–4.4)
GLUCOSE BLD-MCNC: 111 MG/DL (ref 70–99)
INR BLD: 2.12 (ref 0.89–1.11)
M PROTEIN MFR SERPL ELPH: 6.4 G/DL (ref 6.4–8.2)
OSMOLALITY SERPL CALC.SUM OF ELEC: 289 MOSM/KG (ref 275–295)
POTASSIUM SERPL-SCNC: 3.3 MMOL/L (ref 3.5–5.1)
PSA SERPL DL<=0.01 NG/ML-MCNC: 24.3 SECONDS (ref 12.4–14.6)
SODIUM SERPL-SCNC: 140 MMOL/L (ref 136–145)

## 2020-07-05 PROCEDURE — 80053 COMPREHEN METABOLIC PANEL: CPT | Performed by: INTERNAL MEDICINE

## 2020-07-05 PROCEDURE — 85610 PROTHROMBIN TIME: CPT | Performed by: SURGERY

## 2020-07-05 RX ORDER — POTASSIUM CHLORIDE 20 MEQ/1
40 TABLET, EXTENDED RELEASE ORAL EVERY 4 HOURS
Status: COMPLETED | OUTPATIENT
Start: 2020-07-05 | End: 2020-07-05

## 2020-07-05 RX ORDER — HYDROCODONE BITARTRATE AND ACETAMINOPHEN 5; 325 MG/1; MG/1
1-2 TABLET ORAL EVERY 6 HOURS PRN
Qty: 30 TABLET | Refills: 0 | Status: SHIPPED | OUTPATIENT
Start: 2020-07-05 | End: 2020-09-03

## 2020-07-05 RX ORDER — WARFARIN SODIUM 5 MG/1
10 TABLET ORAL
Status: DISCONTINUED | OUTPATIENT
Start: 2020-07-05 | End: 2020-07-05

## 2020-07-05 NOTE — PLAN OF CARE
Problem: PAIN - ADULT  Goal: Verbalizes/displays adequate comfort level or patient's stated pain goal  Description  INTERVENTIONS:  - Encourage pt to monitor pain and request assistance  - Assess pain using appropriate pain scale  - Administer analgesics nonpharmacologic comfort measures as appropriate  - Advance diet as tolerated, if ordered  - Obtain nutritional consult as needed  - Evaluate fluid balance  Outcome: Progressing  Goal: Maintains or returns to baseline bowel function  Description  INTERVENT care algorithm/standards of care as needed  Outcome: Progressing  Goal: Incision(s), wounds(s) or drain site(s) healing without S/S of infection  Description  INTERVENTIONS:  - Assess and document risk factors for pressure ulcer development  - Assess and d

## 2020-07-05 NOTE — PROGRESS NOTES
[facility]  Progress Note    Alfredo Kinney Patient Status:  Observation    1980 MRN [id number]   [facility] 3NW-A Attending Freddy Gomez,*   Hosp Day # 0 PCP Mary Carmen Whiteside MD     Subjective:  Alfredo Kinney is a(n)

## 2020-07-05 NOTE — CONSULTS
[facility] Dosing Service  Warfarin (Coumadin) Subsequent Dosing    Alvarez Wade is a 35year old patient for whom pharmacy is dosing warfarin (Coumadin).  Goal INR is 2-3    Recent Labs   Lab 06/29/20  1237 07/02/20  0850 07/03/20  0529 07/04/20  0532 0

## 2020-07-05 NOTE — DISCHARGE SUMMARY
General Medicine Discharge Summary     Patient ID:  Will Husbands  35year old  7/30/1980    Admit date: 7/2/2020    Discharge date and time:  7/5/20    Attending Physician: Nayana Holley further dosing instructions pending results- will FU with coumadin clinic      # Hypokalemia: replete     # anemia: mild, trend stable     # Proph: SCDs, coumadin    Doing well.  Home today.     Consults: IP CONSULT TO GASTROENTEROLOGY  IP CONSULT TO GENERA of discharge:      07/05/20  0334   BP: 117/73   Pulse: 74   Resp: 18   Temp: 98 °F (36.7 °C)       General: no acute distress, alert and oriented x 3  Heart: RRR  Lungs: clear bilaterally, no active wheezing  Abdomen: nontender, nondistended, intact BS  E

## 2020-07-05 NOTE — PROGRESS NOTES
[facility]  Progress Note    Amy Flood Patient Status:  Observation    1980 MRN [id number]   [facility] 3NW-A Attending Viktoriya Thakkar,*   Hosp Day # 0 PCP Jeovany Gifford MD     Subjective:    Feels better today.

## 2020-07-05 NOTE — PLAN OF CARE
NURSING DISCHARGE NOTE    Discharged Home via Wheelchair. Accompanied by Support staff  Belongings Taken by patient/family. Pt verbalized understanding of d/c instructions.  Pt will resume her UC medication per GI, and resume coumadin tonight, and

## 2020-07-08 ENCOUNTER — LAB ENCOUNTER (OUTPATIENT)
Dept: LAB | Age: 40
End: 2020-07-08
Attending: INTERNAL MEDICINE
Payer: COMMERCIAL

## 2020-07-08 DIAGNOSIS — R74.02 NONSPECIFIC ELEVATION OF LEVELS OF TRANSAMINASE OR LACTIC ACID DEHYDROGENASE (LDH): ICD-10-CM

## 2020-07-08 DIAGNOSIS — I63.512 ACUTE ISCHEMIC LEFT MCA STROKE (HCC): ICD-10-CM

## 2020-07-08 DIAGNOSIS — R74.01 NONSPECIFIC ELEVATION OF LEVELS OF TRANSAMINASE OR LACTIC ACID DEHYDROGENASE (LDH): ICD-10-CM

## 2020-07-08 LAB
ALBUMIN SERPL-MCNC: 3.3 G/DL (ref 3.4–5)
ALP LIVER SERPL-CCNC: 415 U/L (ref 37–98)
ALT SERPL-CCNC: 133 U/L (ref 13–56)
AST SERPL-CCNC: 347 U/L (ref 15–37)
BILIRUB DIRECT SERPL-MCNC: 0.2 MG/DL (ref 0–0.2)
BILIRUB SERPL-MCNC: 0.8 MG/DL (ref 0.1–2)
INR BLD: 2.9 (ref 0.88–1.11)
M PROTEIN MFR SERPL ELPH: 8.8 G/DL (ref 6.4–8.2)
PSA SERPL DL<=0.01 NG/ML-MCNC: 30.1 SECONDS (ref 12–14.3)

## 2020-07-08 PROCEDURE — 85610 PROTHROMBIN TIME: CPT

## 2020-07-08 PROCEDURE — 36415 COLL VENOUS BLD VENIPUNCTURE: CPT

## 2020-07-08 PROCEDURE — 80076 HEPATIC FUNCTION PANEL: CPT

## 2020-07-09 NOTE — PROGRESS NOTES
Liver enzymes are high again after resuming Azathioprine after discharge. 1.  Stop Azathhioprine. 2.  Repeat labs in 1 week. Ordered  3. Discussed with Dr Celeste Mccall. Results given to patient verbalized understanding.   Sent to [address]

## 2020-07-15 ENCOUNTER — LAB ENCOUNTER (OUTPATIENT)
Dept: LAB | Age: 40
End: 2020-07-15
Attending: INTERNAL MEDICINE
Payer: COMMERCIAL

## 2020-07-15 DIAGNOSIS — R74.01 NONSPECIFIC ELEVATION OF LEVELS OF TRANSAMINASE OR LACTIC ACID DEHYDROGENASE (LDH): ICD-10-CM

## 2020-07-15 DIAGNOSIS — I63.512 ACUTE ISCHEMIC LEFT MCA STROKE (HCC): ICD-10-CM

## 2020-07-15 DIAGNOSIS — R74.02 NONSPECIFIC ELEVATION OF LEVELS OF TRANSAMINASE OR LACTIC ACID DEHYDROGENASE (LDH): ICD-10-CM

## 2020-07-15 LAB
ALBUMIN SERPL-MCNC: 3.4 G/DL (ref 3.4–5)
ALP LIVER SERPL-CCNC: 183 U/L (ref 37–98)
ALT SERPL-CCNC: 44 U/L (ref 13–56)
AST SERPL-CCNC: 42 U/L (ref 15–37)
BILIRUB DIRECT SERPL-MCNC: 0.2 MG/DL (ref 0–0.2)
BILIRUB SERPL-MCNC: 0.6 MG/DL (ref 0.1–2)
INR BLD: 2.38 (ref 0.88–1.11)
M PROTEIN MFR SERPL ELPH: 8.3 G/DL (ref 6.4–8.2)
PSA SERPL DL<=0.01 NG/ML-MCNC: 25.8 SECONDS (ref 12–14.3)

## 2020-07-15 PROCEDURE — 85610 PROTHROMBIN TIME: CPT

## 2020-07-15 PROCEDURE — 36415 COLL VENOUS BLD VENIPUNCTURE: CPT

## 2020-07-15 PROCEDURE — 80076 HEPATIC FUNCTION PANEL: CPT

## 2020-07-15 NOTE — PROGRESS NOTES
Liver enzymes are better off Azathioprine. Would not resume Azathioprine. Please let patient know.   Sent to [address]

## 2020-08-02 ENCOUNTER — HOSPITAL ENCOUNTER (EMERGENCY)
Facility: HOSPITAL | Age: 40
Discharge: HOME | End: 2020-08-02
Attending: EMERGENCY MEDICINE
Payer: COMMERCIAL

## 2020-08-02 ENCOUNTER — APPOINTMENT (OUTPATIENT)
Dept: GENERAL RADIOLOGY | Facility: HOSPITAL | Age: 40
End: 2020-08-02
Attending: EMERGENCY MEDICINE
Payer: COMMERCIAL

## 2020-08-02 VITALS
WEIGHT: 175 LBS | DIASTOLIC BLOOD PRESSURE: 74 MMHG | BODY MASS INDEX: 28.13 KG/M2 | OXYGEN SATURATION: 98 % | TEMPERATURE: 98 F | HEIGHT: 66 IN | HEART RATE: 101 BPM | SYSTOLIC BLOOD PRESSURE: 106 MMHG | RESPIRATION RATE: 16 BRPM

## 2020-08-02 DIAGNOSIS — R63.0 DECREASED APPETITE: Primary | ICD-10-CM

## 2020-08-02 LAB
ALBUMIN SERPL-MCNC: 2.8 G/DL (ref 3.4–5)
ALBUMIN/GLOB SERPL: 0.6 {RATIO} (ref 1–2)
ALP LIVER SERPL-CCNC: 117 U/L (ref 37–98)
ALT SERPL-CCNC: 17 U/L (ref 13–56)
ANION GAP SERPL CALC-SCNC: 3 MMOL/L (ref 0–18)
APTT PPP: 52.9 SECONDS (ref 25.4–36.1)
AST SERPL-CCNC: 44 U/L (ref 15–37)
BASOPHILS # BLD: 0.2 X10(3) UL (ref 0–0.2)
BASOPHILS NFR BLD: 2 %
BILIRUB SERPL-MCNC: 0.7 MG/DL (ref 0.1–2)
BUN BLD-MCNC: 5 MG/DL (ref 7–18)
BUN/CREAT SERPL: 6.7 (ref 10–20)
CALCIUM BLD-MCNC: 8.1 MG/DL (ref 8.5–10.1)
CHLORIDE SERPL-SCNC: 106 MMOL/L (ref 98–112)
CO2 SERPL-SCNC: 28 MMOL/L (ref 21–32)
CREAT BLD-MCNC: 0.75 MG/DL (ref 0.55–1.02)
DEPRECATED RDW RBC AUTO: 44 FL (ref 35.1–46.3)
EOSINOPHIL # BLD: 0.2 X10(3) UL (ref 0–0.7)
EOSINOPHIL NFR BLD: 2 %
ERYTHROCYTE [DISTWIDTH] IN BLOOD BY AUTOMATED COUNT: 14 % (ref 11–15)
GLOBULIN PLAS-MCNC: 4.8 G/DL (ref 2.8–4.4)
GLUCOSE BLD-MCNC: 91 MG/DL (ref 70–99)
HCT VFR BLD AUTO: 37.4 % (ref 35–48)
HGB BLD-MCNC: 12.5 G/DL (ref 12–16)
INR BLD: 2.16 (ref 0.89–1.11)
LYMPHOCYTES NFR BLD: 0.82 X10(3) UL (ref 1–4)
LYMPHOCYTES NFR BLD: 8 %
M PROTEIN MFR SERPL ELPH: 7.6 G/DL (ref 6.4–8.2)
MCH RBC QN AUTO: 29 PG (ref 26–34)
MCHC RBC AUTO-ENTMCNC: 33.4 G/DL (ref 31–37)
MCV RBC AUTO: 86.8 FL (ref 80–100)
MONOCYTES # BLD: 0.71 X10(3) UL (ref 0.1–1)
MONOCYTES NFR BLD: 7 %
MORPHOLOGY: NORMAL
MYELOCYTES # BLD: 0.1 X10(3) UL
MYELOCYTES NFR BLD: 1 %
NEUTROPHILS # BLD AUTO: 8.21 X10 (3) UL (ref 1.5–7.7)
NEUTROPHILS NFR BLD: 68 %
NEUTS BAND NFR BLD: 12 %
NEUTS HYPERSEG # BLD: 8.16 X10(3) UL (ref 1.5–7.7)
OSMOLALITY SERPL CALC.SUM OF ELEC: 281 MOSM/KG (ref 275–295)
PLATELET # BLD AUTO: 313 10(3)UL (ref 150–450)
PLATELET MORPHOLOGY: NORMAL
POTASSIUM SERPL-SCNC: 3.1 MMOL/L (ref 3.5–5.1)
PSA SERPL DL<=0.01 NG/ML-MCNC: 24.6 SECONDS (ref 12.4–14.6)
RBC # BLD AUTO: 4.31 X10(6)UL (ref 3.8–5.3)
SODIUM SERPL-SCNC: 137 MMOL/L (ref 136–145)
TOTAL CELLS COUNTED: 100
WBC # BLD AUTO: 10.2 X10(3) UL (ref 4–11)

## 2020-08-02 PROCEDURE — 96374 THER/PROPH/DIAG INJ IV PUSH: CPT

## 2020-08-02 PROCEDURE — 80053 COMPREHEN METABOLIC PANEL: CPT | Performed by: EMERGENCY MEDICINE

## 2020-08-02 PROCEDURE — 96361 HYDRATE IV INFUSION ADD-ON: CPT

## 2020-08-02 PROCEDURE — 85007 BL SMEAR W/DIFF WBC COUNT: CPT | Performed by: EMERGENCY MEDICINE

## 2020-08-02 PROCEDURE — 85025 COMPLETE CBC W/AUTO DIFF WBC: CPT | Performed by: EMERGENCY MEDICINE

## 2020-08-02 PROCEDURE — 96376 TX/PRO/DX INJ SAME DRUG ADON: CPT

## 2020-08-02 PROCEDURE — 85730 THROMBOPLASTIN TIME PARTIAL: CPT | Performed by: EMERGENCY MEDICINE

## 2020-08-02 PROCEDURE — 85027 COMPLETE CBC AUTOMATED: CPT | Performed by: EMERGENCY MEDICINE

## 2020-08-02 PROCEDURE — 74019 RADEX ABDOMEN 2 VIEWS: CPT | Performed by: EMERGENCY MEDICINE

## 2020-08-02 PROCEDURE — 99284 EMERGENCY DEPT VISIT MOD MDM: CPT

## 2020-08-02 PROCEDURE — 99285 EMERGENCY DEPT VISIT HI MDM: CPT

## 2020-08-02 PROCEDURE — 85610 PROTHROMBIN TIME: CPT | Performed by: EMERGENCY MEDICINE

## 2020-08-02 RX ORDER — ONDANSETRON 4 MG/1
4 TABLET, ORALLY DISINTEGRATING ORAL EVERY 4 HOURS PRN
Qty: 10 TABLET | Refills: 0 | Status: SHIPPED | OUTPATIENT
Start: 2020-08-02 | End: 2020-08-09

## 2020-08-02 RX ORDER — ONDANSETRON 2 MG/ML
4 INJECTION INTRAMUSCULAR; INTRAVENOUS ONCE
Status: COMPLETED | OUTPATIENT
Start: 2020-08-02 | End: 2020-08-02

## 2020-08-02 NOTE — ED INITIAL ASSESSMENT (HPI)
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 Unable to eat anything for past 2 days. Trouble with eating for past 6 weeks, 20 lb weight loss. Increasing diarrhea/watery stools. Tolerating small amount of fluids.  Developed vomiting and near syncope this mo

## 2020-08-02 NOTE — ED NOTES
Patient updated on results and plan for discharge. Drinking water at this time and tolerating well. Alert and appropriate. No vomiting here since arrival. Feeling a little better with additional nausea medication.  Observed ambulatory to bathroom with stead

## 2020-08-02 NOTE — ED NOTES
Patient has returned from xray department. IV fluids continue to infuse. Alert and appropriate. States she continues to have mild nausea and bodyaches, declines offer for pain medication. Remains updated with plan of care.  Unable to provide urine specimen

## 2020-08-02 NOTE — ED PROVIDER NOTES
Patient Seen in: [facility] Emergency Department      History   Patient presents with:  Nausea/Vomiting/Diarrhea    Stated Complaint: nausea vomiting not eating    HPI    Patient is a pleasant 48-year-old female, with multiple past medical problems, Years since quittin.0      Smokeless tobacco: Never Used    Alcohol use: Yes      Frequency: Monthly or less      Drinks per session: 1 or 2      Binge frequency: Never    Drug use:  No             Review of Systems    Positive for stated complaint: limits   PROTHROMBIN TIME (PT) - Abnormal; Notable for the following components:    PT 24.6 (*)     INR 2.16 (*)     All other components within normal limits   PTT, ACTIVATED - Abnormal; Notable for the following components:    PTT 52.9 (*)     All other with recent cholecystectomy. CONCLUSION:  There has been recent cholecystectomy. There is no acute abnormality.     Dictated by (CST): Breanne Abreu MD on 8/02/2020 at 12:09 PM     Finalized by (CST): Breanne Abreu MD on 8/02/2020 at 12:10 PM

## 2020-08-04 ENCOUNTER — HOSPITAL ENCOUNTER (INPATIENT)
Facility: HOSPITAL | Age: 40
LOS: 3 days | Discharge: HOME | DRG: 386 | End: 2020-08-09
Attending: EMERGENCY MEDICINE | Admitting: HOSPITALIST
Payer: COMMERCIAL

## 2020-08-04 DIAGNOSIS — R63.4 WEIGHT LOSS, NON-INTENTIONAL: ICD-10-CM

## 2020-08-04 DIAGNOSIS — E87.6 HYPOKALEMIA: ICD-10-CM

## 2020-08-04 DIAGNOSIS — R19.7 BLOODY DIARRHEA: ICD-10-CM

## 2020-08-04 DIAGNOSIS — R11.0 NAUSEA: ICD-10-CM

## 2020-08-04 DIAGNOSIS — E86.0 DEHYDRATION: ICD-10-CM

## 2020-08-04 DIAGNOSIS — E83.42 HYPOMAGNESEMIA: ICD-10-CM

## 2020-08-04 DIAGNOSIS — K51.019 ULCERATIVE PANCOLITIS WITH COMPLICATION (HCC): Primary | ICD-10-CM

## 2020-08-04 LAB
ALBUMIN SERPL-MCNC: 2.4 G/DL (ref 3.4–5)
ALBUMIN/GLOB SERPL: 0.5 {RATIO} (ref 1–2)
ALP LIVER SERPL-CCNC: 113 U/L (ref 37–98)
ALT SERPL-CCNC: 14 U/L (ref 13–56)
ANION GAP SERPL CALC-SCNC: 6 MMOL/L (ref 0–18)
AST SERPL-CCNC: 29 U/L (ref 15–37)
BASOPHILS # BLD AUTO: 0.04 X10(3) UL (ref 0–0.2)
BASOPHILS NFR BLD AUTO: 0.7 %
BILIRUB SERPL-MCNC: 0.6 MG/DL (ref 0.1–2)
BUN BLD-MCNC: 4 MG/DL (ref 7–18)
BUN/CREAT SERPL: 6.1 (ref 10–20)
C DIFF TOX B STL QL: NEGATIVE
CALCIUM BLD-MCNC: 7.5 MG/DL (ref 8.5–10.1)
CHLORIDE SERPL-SCNC: 110 MMOL/L (ref 98–112)
CO2 SERPL-SCNC: 27 MMOL/L (ref 21–32)
CREAT BLD-MCNC: 0.66 MG/DL (ref 0.55–1.02)
DEPRECATED RDW RBC AUTO: 44.4 FL (ref 35.1–46.3)
EOSINOPHIL # BLD AUTO: 0.07 X10(3) UL (ref 0–0.7)
EOSINOPHIL NFR BLD AUTO: 1.1 %
ERYTHROCYTE [DISTWIDTH] IN BLOOD BY AUTOMATED COUNT: 13.8 % (ref 11–15)
GLOBULIN PLAS-MCNC: 4.5 G/DL (ref 2.8–4.4)
GLUCOSE BLD-MCNC: 76 MG/DL (ref 70–99)
HAV IGM SER QL: 1.4 MG/DL (ref 1.6–2.6)
HCT VFR BLD AUTO: 37.2 % (ref 35–48)
HGB BLD-MCNC: 12.5 G/DL (ref 12–16)
IMM GRANULOCYTES # BLD AUTO: 0.06 X10(3) UL (ref 0–1)
IMM GRANULOCYTES NFR BLD: 1 %
INR BLD: 3.76 (ref 0.89–1.11)
LIPASE SERPL-CCNC: 119 U/L (ref 73–393)
LYMPHOCYTES # BLD AUTO: 1.75 X10(3) UL (ref 1–4)
LYMPHOCYTES NFR BLD AUTO: 28.6 %
M PROTEIN MFR SERPL ELPH: 6.9 G/DL (ref 6.4–8.2)
MCH RBC QN AUTO: 29.6 PG (ref 26–34)
MCHC RBC AUTO-ENTMCNC: 33.6 G/DL (ref 31–37)
MCV RBC AUTO: 87.9 FL (ref 80–100)
MONOCYTES # BLD AUTO: 0.72 X10(3) UL (ref 0.1–1)
MONOCYTES NFR BLD AUTO: 11.8 %
NEUTROPHILS # BLD AUTO: 3.48 X10 (3) UL (ref 1.5–7.7)
NEUTROPHILS # BLD AUTO: 3.48 X10(3) UL (ref 1.5–7.7)
NEUTROPHILS NFR BLD AUTO: 56.8 %
OSMOLALITY SERPL CALC.SUM OF ELEC: 292 MOSM/KG (ref 275–295)
PLATELET # BLD AUTO: 296 10(3)UL (ref 150–450)
POTASSIUM SERPL-SCNC: 2.8 MMOL/L (ref 3.5–5.1)
PSA SERPL DL<=0.01 NG/ML-MCNC: 38 SECONDS (ref 12.4–14.6)
RBC # BLD AUTO: 4.23 X10(6)UL (ref 3.8–5.3)
SODIUM SERPL-SCNC: 143 MMOL/L (ref 136–145)
WBC # BLD AUTO: 6.1 X10(3) UL (ref 4–11)

## 2020-08-04 PROCEDURE — 87045 FECES CULTURE AEROBIC BACT: CPT | Performed by: EMERGENCY MEDICINE

## 2020-08-04 PROCEDURE — 85610 PROTHROMBIN TIME: CPT | Performed by: EMERGENCY MEDICINE

## 2020-08-04 PROCEDURE — 87427 SHIGA-LIKE TOXIN AG IA: CPT | Performed by: EMERGENCY MEDICINE

## 2020-08-04 PROCEDURE — 87046 STOOL CULTR AEROBIC BACT EA: CPT | Performed by: EMERGENCY MEDICINE

## 2020-08-04 PROCEDURE — 83735 ASSAY OF MAGNESIUM: CPT | Performed by: EMERGENCY MEDICINE

## 2020-08-04 PROCEDURE — 99285 EMERGENCY DEPT VISIT HI MDM: CPT

## 2020-08-04 PROCEDURE — 83690 ASSAY OF LIPASE: CPT | Performed by: EMERGENCY MEDICINE

## 2020-08-04 PROCEDURE — 87493 C DIFF AMPLIFIED PROBE: CPT | Performed by: EMERGENCY MEDICINE

## 2020-08-04 PROCEDURE — 85025 COMPLETE CBC W/AUTO DIFF WBC: CPT | Performed by: EMERGENCY MEDICINE

## 2020-08-04 PROCEDURE — 96375 TX/PRO/DX INJ NEW DRUG ADDON: CPT

## 2020-08-04 PROCEDURE — 82272 OCCULT BLD FECES 1-3 TESTS: CPT | Performed by: EMERGENCY MEDICINE

## 2020-08-04 PROCEDURE — 80053 COMPREHEN METABOLIC PANEL: CPT | Performed by: EMERGENCY MEDICINE

## 2020-08-04 PROCEDURE — 96368 THER/DIAG CONCURRENT INF: CPT

## 2020-08-04 PROCEDURE — 96361 HYDRATE IV INFUSION ADD-ON: CPT

## 2020-08-04 PROCEDURE — 96365 THER/PROPH/DIAG IV INF INIT: CPT

## 2020-08-04 RX ORDER — HYDROMORPHONE HYDROCHLORIDE 1 MG/ML
0.5 INJECTION, SOLUTION INTRAMUSCULAR; INTRAVENOUS; SUBCUTANEOUS ONCE
Status: COMPLETED | OUTPATIENT
Start: 2020-08-04 | End: 2020-08-04

## 2020-08-04 RX ORDER — DIPHENHYDRAMINE HYDROCHLORIDE 50 MG/ML
50 INJECTION INTRAMUSCULAR; INTRAVENOUS ONCE
Status: COMPLETED | OUTPATIENT
Start: 2020-08-04 | End: 2020-08-04

## 2020-08-04 RX ORDER — SODIUM CHLORIDE 9 MG/ML
125 INJECTION, SOLUTION INTRAVENOUS CONTINUOUS
Status: DISCONTINUED | OUTPATIENT
Start: 2020-08-04 | End: 2020-08-05

## 2020-08-04 RX ORDER — MAGNESIUM SULFATE 1 G/100ML
1 INJECTION INTRAVENOUS ONCE
Status: COMPLETED | OUTPATIENT
Start: 2020-08-04 | End: 2020-08-05

## 2020-08-04 RX ORDER — HYDROMORPHONE HYDROCHLORIDE 1 MG/ML
0.5 INJECTION, SOLUTION INTRAMUSCULAR; INTRAVENOUS; SUBCUTANEOUS EVERY 30 MIN PRN
Status: ACTIVE | OUTPATIENT
Start: 2020-08-04 | End: 2020-08-05

## 2020-08-04 RX ORDER — METOCLOPRAMIDE HYDROCHLORIDE 5 MG/ML
10 INJECTION INTRAMUSCULAR; INTRAVENOUS ONCE
Status: COMPLETED | OUTPATIENT
Start: 2020-08-04 | End: 2020-08-04

## 2020-08-04 RX ORDER — POTASSIUM CHLORIDE 14.9 MG/ML
20 INJECTION INTRAVENOUS ONCE
Status: DISCONTINUED | OUTPATIENT
Start: 2020-08-04 | End: 2020-08-04

## 2020-08-04 RX ORDER — DEXTROSE AND SODIUM CHLORIDE 5; .45 G/100ML; G/100ML
INJECTION, SOLUTION INTRAVENOUS CONTINUOUS
Status: ACTIVE | OUTPATIENT
Start: 2020-08-04 | End: 2020-08-05

## 2020-08-04 RX ORDER — METHYLPREDNISOLONE SODIUM SUCCINATE 40 MG/ML
20 INJECTION, POWDER, LYOPHILIZED, FOR SOLUTION INTRAMUSCULAR; INTRAVENOUS EVERY 12 HOURS
Status: DISCONTINUED | OUTPATIENT
Start: 2020-08-04 | End: 2020-08-09

## 2020-08-04 NOTE — ED PROVIDER NOTES
Patient Seen in: [facility] Emergency Department      History   Patient presents with:  Dehydration  Nausea/Vomiting/Diarrhea    Stated Complaint: Hx of drug induced hepatitis w/ ulcerative colitis ~ was here sunday and cannot*    HPI    23-year-old having to wake up and again started having diarrhea.   She is accompanied to the emergency department by her mother    Past Medical History:   Diagnosis Date   • Asthma    • Blood disorder     Factor V   • Closed fracture of middle or proximal phalanx or ph Head: Normocephalic and atraumatic. Right Ear: External ear normal.      Left Ear: External ear normal.      Nose: Nose normal.      Mouth/Throat:      Comments: Dry oral mucosa  Eyes:      General: No scleral icterus.         Right eye: No discha Psychiatric:         Mood and Affect: Mood is anxious.          Behavior: Behavior normal.         Judgment: Judgment normal.             ED Course     Labs Reviewed   COMP METABOLIC PANEL (14) - Abnormal; Notable for the following components:       Resul KELLY[[id number]]                                       In process                   Please view results for these tests on the individual orders.    STOOL CULTURE(P)   SHIGATOXIN   RAPID SARS-COV-2 BY PCR   RAPID SARS-COV-2 BY PCR   CBC W/ DIFFERENTI for the diarrhea is treated, her symptoms will not improve normal she clinically improved I discussed this with the patient and her mother patient is willing to start steroids at this time and she realizes it is really going to need to happen.   Ordered pot

## 2020-08-04 NOTE — ED INITIAL ASSESSMENT (HPI)
Pt to ED with complaints of difficulty eating and drinking. Pt report she was seen in ED on Sunday and was told to follow up and set up infusions in [place].  Pt reports infusion clinic is too busy and her GI specialist told her to come back to ER for eval.

## 2020-08-05 PROBLEM — E83.42 HYPOMAGNESEMIA: Status: ACTIVE | Noted: 2020-08-05

## 2020-08-05 PROBLEM — K51.019 ULCERATIVE PANCOLITIS WITH COMPLICATION (HCC): Status: ACTIVE | Noted: 2020-08-05

## 2020-08-05 PROBLEM — E86.0 DEHYDRATION: Status: ACTIVE | Noted: 2020-08-05

## 2020-08-05 PROBLEM — R11.0 NAUSEA: Status: ACTIVE | Noted: 2020-08-05

## 2020-08-05 PROBLEM — R19.7 BLOODY DIARRHEA: Status: ACTIVE | Noted: 2020-08-05

## 2020-08-05 PROBLEM — R63.4 WEIGHT LOSS, NON-INTENTIONAL: Status: ACTIVE | Noted: 2020-08-05

## 2020-08-05 LAB
ANION GAP SERPL CALC-SCNC: 4 MMOL/L (ref 0–18)
BASOPHILS # BLD AUTO: 0.02 X10(3) UL (ref 0–0.2)
BASOPHILS NFR BLD AUTO: 0.4 %
BUN BLD-MCNC: 3 MG/DL (ref 7–18)
BUN/CREAT SERPL: 6.3 (ref 10–20)
CALCIUM BLD-MCNC: 7.5 MG/DL (ref 8.5–10.1)
CHLORIDE SERPL-SCNC: 114 MMOL/L (ref 98–112)
CO2 SERPL-SCNC: 23 MMOL/L (ref 21–32)
CREAT BLD-MCNC: 0.48 MG/DL (ref 0.55–1.02)
DEPRECATED RDW RBC AUTO: 44.6 FL (ref 35.1–46.3)
EOSINOPHIL # BLD AUTO: 0 X10(3) UL (ref 0–0.7)
EOSINOPHIL NFR BLD AUTO: 0 %
ERYTHROCYTE [DISTWIDTH] IN BLOOD BY AUTOMATED COUNT: 13.6 % (ref 11–15)
GLUCOSE BLD-MCNC: 111 MG/DL (ref 70–99)
HCT VFR BLD AUTO: 32.5 % (ref 35–48)
HGB BLD-MCNC: 10.3 G/DL (ref 12–16)
IMM GRANULOCYTES # BLD AUTO: 0.07 X10(3) UL (ref 0–1)
IMM GRANULOCYTES NFR BLD: 1.5 %
INR BLD: 3.81 (ref 0.89–1.11)
LYMPHOCYTES # BLD AUTO: 0.52 X10(3) UL (ref 1–4)
LYMPHOCYTES NFR BLD AUTO: 11.4 %
MCH RBC QN AUTO: 28.4 PG (ref 26–34)
MCHC RBC AUTO-ENTMCNC: 31.7 G/DL (ref 31–37)
MCV RBC AUTO: 89.5 FL (ref 80–100)
MONOCYTES # BLD AUTO: 0.13 X10(3) UL (ref 0.1–1)
MONOCYTES NFR BLD AUTO: 2.8 %
NEUTROPHILS # BLD AUTO: 3.83 X10 (3) UL (ref 1.5–7.7)
NEUTROPHILS # BLD AUTO: 3.83 X10(3) UL (ref 1.5–7.7)
NEUTROPHILS NFR BLD AUTO: 83.9 %
OSMOLALITY SERPL CALC.SUM OF ELEC: 289 MOSM/KG (ref 275–295)
PLATELET # BLD AUTO: 263 10(3)UL (ref 150–450)
POTASSIUM SERPL-SCNC: 3.7 MMOL/L (ref 3.5–5.1)
PSA SERPL DL<=0.01 NG/ML-MCNC: 38.4 SECONDS (ref 12.4–14.6)
RBC # BLD AUTO: 3.63 X10(6)UL (ref 3.8–5.3)
SARS-COV-2 RNA RESP QL NAA+PROBE: NOT DETECTED
SODIUM SERPL-SCNC: 141 MMOL/L (ref 136–145)
WBC # BLD AUTO: 4.6 X10(3) UL (ref 4–11)

## 2020-08-05 PROCEDURE — 80048 BASIC METABOLIC PNL TOTAL CA: CPT | Performed by: HOSPITALIST

## 2020-08-05 PROCEDURE — 85025 COMPLETE CBC W/AUTO DIFF WBC: CPT | Performed by: HOSPITALIST

## 2020-08-05 PROCEDURE — 85610 PROTHROMBIN TIME: CPT | Performed by: HOSPITALIST

## 2020-08-05 RX ORDER — BALSALAZIDE DISODIUM 750 MG/1
2250 CAPSULE ORAL 2 TIMES DAILY
Status: DISCONTINUED | OUTPATIENT
Start: 2020-08-05 | End: 2020-08-09

## 2020-08-05 RX ORDER — DICYCLOMINE HCL 20 MG
20 TABLET ORAL EVERY 4 HOURS PRN
Status: DISCONTINUED | OUTPATIENT
Start: 2020-08-05 | End: 2020-08-08

## 2020-08-05 RX ORDER — PANTOPRAZOLE SODIUM 40 MG/1
40 TABLET, DELAYED RELEASE ORAL
Status: DISCONTINUED | OUTPATIENT
Start: 2020-08-05 | End: 2020-08-09

## 2020-08-05 RX ORDER — SODIUM CHLORIDE 9 MG/ML
INJECTION, SOLUTION INTRAVENOUS CONTINUOUS
Status: DISCONTINUED | OUTPATIENT
Start: 2020-08-05 | End: 2020-08-06

## 2020-08-05 RX ORDER — ACETAMINOPHEN 325 MG/1
650 TABLET ORAL EVERY 6 HOURS PRN
Status: DISCONTINUED | OUTPATIENT
Start: 2020-08-05 | End: 2020-08-09

## 2020-08-05 RX ORDER — HYDROMORPHONE HYDROCHLORIDE 1 MG/ML
0.4 INJECTION, SOLUTION INTRAMUSCULAR; INTRAVENOUS; SUBCUTANEOUS EVERY 4 HOURS PRN
Status: DISCONTINUED | OUTPATIENT
Start: 2020-08-05 | End: 2020-08-09

## 2020-08-05 RX ORDER — ONDANSETRON 2 MG/ML
4 INJECTION INTRAMUSCULAR; INTRAVENOUS EVERY 6 HOURS PRN
Status: DISCONTINUED | OUTPATIENT
Start: 2020-08-05 | End: 2020-08-09

## 2020-08-05 RX ORDER — POTASSIUM CHLORIDE 20 MEQ/1
40 TABLET, EXTENDED RELEASE ORAL ONCE
Status: COMPLETED | OUTPATIENT
Start: 2020-08-05 | End: 2020-08-05

## 2020-08-05 RX ORDER — METOCLOPRAMIDE HYDROCHLORIDE 5 MG/ML
10 INJECTION INTRAMUSCULAR; INTRAVENOUS EVERY 8 HOURS PRN
Status: DISCONTINUED | OUTPATIENT
Start: 2020-08-05 | End: 2020-08-09

## 2020-08-05 RX ORDER — HYDROMORPHONE HYDROCHLORIDE 1 MG/ML
0.2 INJECTION, SOLUTION INTRAMUSCULAR; INTRAVENOUS; SUBCUTANEOUS EVERY 4 HOURS PRN
Status: DISCONTINUED | OUTPATIENT
Start: 2020-08-05 | End: 2020-08-09

## 2020-08-05 RX ORDER — WARFARIN SODIUM 5 MG/1
10 TABLET ORAL NIGHTLY
Status: DISCONTINUED | OUTPATIENT
Start: 2020-08-06 | End: 2020-08-05

## 2020-08-05 RX ORDER — ATORVASTATIN CALCIUM 40 MG/1
40 TABLET, FILM COATED ORAL NIGHTLY
Status: DISCONTINUED | OUTPATIENT
Start: 2020-08-05 | End: 2020-08-09

## 2020-08-05 RX ORDER — HYDROMORPHONE HYDROCHLORIDE 1 MG/ML
0.8 INJECTION, SOLUTION INTRAMUSCULAR; INTRAVENOUS; SUBCUTANEOUS EVERY 4 HOURS PRN
Status: DISCONTINUED | OUTPATIENT
Start: 2020-08-05 | End: 2020-08-09

## 2020-08-05 RX ORDER — MAGNESIUM OXIDE 400 MG (241.3 MG MAGNESIUM) TABLET
800 TABLET ONCE
Status: COMPLETED | OUTPATIENT
Start: 2020-08-05 | End: 2020-08-05

## 2020-08-05 RX ORDER — ALBUTEROL SULFATE 2.5 MG/3ML
3 SOLUTION RESPIRATORY (INHALATION) EVERY 4 HOURS PRN
Status: DISCONTINUED | OUTPATIENT
Start: 2020-08-05 | End: 2020-08-09

## 2020-08-05 NOTE — PLAN OF CARE
NURSING ADMISSION NOTE      Patient admitted via Cart  Oriented to room. Safety precautions initiated. Bed in low position. Call light in reach. Pt here with pancolitis, dehydration and electrolyte replacement. Magnesium replaced in ED.  Potassium

## 2020-08-05 NOTE — DIETARY NOTE
[facility]    NUTRITION INITIAL ASSESSMENT    NUTRITION DIAGNOSIS/PROBLEM:    Unintentional weight loss related to physiologic causes increasing nutrient needs and possible inadequate energy intake as evidenced by significant wt loss (~20 lb, 10.5% in Addresses: Yes    NUTRITION RELATED PHYSICAL FINDINGS:   Unable to conduct nutrition focused physical exam at this time.     NUTRITION PRESCRIPTION: 78 kg  Calories: 2907-6768 calories/day (23-25 calories per kg)  Protein:  grams protein/day (1.0-1.3

## 2020-08-05 NOTE — CONSULTS
[facility] IP Report of Consultation   [facility] Department of  G[facility]    8/5/2020    Aniyah Charisse  female   Manuel Daniel MD     [id number]  7/30/1980 Primary Care Physician  Shahid Dobbs MD     Reason for Consultation: ul superficial ulcerations scattered throughout. I would say this would be at least moderate to severe. Retroversion in the rectal vault revealed subtle changes also. There were no other abnormalities. Random biopsies were obtained. Prep was adequate.   R sibling, family history of   • Diabetes Other         sibling, family history of    • Colon Cancer Maternal Grandmother    • Diabetes Maternal Grandmother    • Heart Attack Maternal Grandmother    • Heart Surgery Maternal Grandmother    • Other (Liver Canc

## 2020-08-05 NOTE — H&P
[facility] hospitalist H+P  PCP Gabriela Stein MD   Patient was seen/examined on 8/5/20  Cc bloody diarrhea  HPI 37 yo female with multiple medical problems including but not limited to asthma, factor V leiden on coumadin, ulcerative colitis here due to bloo Grandfather    • Diabetes Paternal Grandfather      Social History    Socioeconomic History      Marital status:       Spouse name: Not on file      Number of children: Not on file      Years of education: Not on file      Highest education level: N HIVES  Theophylline              Med  No current facility-administered medications on file prior to encounter. Balsalazide Disodium 750 MG Oral Cap, Take 3 capsules (2,250 mg total) by mouth 2 (two) times daily. , Disp: 540 capsule, Rfl: 0  HUMIRA PEN 40 tablet, Rfl: 0  ALBUTEROL SULFATE  (90 Base) MCG/ACT Inhalation Aero Soln, INHALE 2 PUFFS BY MOUTH EVERY 4 HOURS AS NEEDED, Disp: 1 Inhaler, Rfl: 3  Albuterol Sulfate (VENTOLIN) (2.5 MG/3ML) 0.083% Inhalation Nebu Soln, Take 3 mL by nebulization 4 (

## 2020-08-05 NOTE — PLAN OF CARE
Pt is aox4 on room air no tle at pt has denied any sob or chest pain at this time. Pt has had a poor appetite no episodes of N/V but pt does have frequent bowel movements.  Pt states she has abdominal cramps after eating pt given dilaudid and pt tolerated t

## 2020-08-06 LAB
ANION GAP SERPL CALC-SCNC: 3 MMOL/L (ref 0–18)
BASOPHILS # BLD AUTO: 0.01 X10(3) UL (ref 0–0.2)
BASOPHILS NFR BLD AUTO: 0.1 %
BUN BLD-MCNC: 3 MG/DL (ref 7–18)
BUN/CREAT SERPL: 5.4 (ref 10–20)
CALCIUM BLD-MCNC: 8.1 MG/DL (ref 8.5–10.1)
CHLORIDE SERPL-SCNC: 115 MMOL/L (ref 98–112)
CO2 SERPL-SCNC: 23 MMOL/L (ref 21–32)
CREAT BLD-MCNC: 0.56 MG/DL (ref 0.55–1.02)
DEPRECATED RDW RBC AUTO: 46.1 FL (ref 35.1–46.3)
EOSINOPHIL # BLD AUTO: 0 X10(3) UL (ref 0–0.7)
EOSINOPHIL NFR BLD AUTO: 0 %
ERYTHROCYTE [DISTWIDTH] IN BLOOD BY AUTOMATED COUNT: 13.8 % (ref 11–15)
GLUCOSE BLD-MCNC: 126 MG/DL (ref 70–99)
HAV IGM SER QL: 2.1 MG/DL (ref 1.6–2.6)
HCT VFR BLD AUTO: 33.2 % (ref 35–48)
HGB BLD-MCNC: 10.6 G/DL (ref 12–16)
IMM GRANULOCYTES # BLD AUTO: 0.1 X10(3) UL (ref 0–1)
IMM GRANULOCYTES NFR BLD: 1.5 %
INR BLD: 4.66 (ref 0.89–1.11)
LYMPHOCYTES # BLD AUTO: 0.89 X10(3) UL (ref 1–4)
LYMPHOCYTES NFR BLD AUTO: 13.3 %
MCH RBC QN AUTO: 28.8 PG (ref 26–34)
MCHC RBC AUTO-ENTMCNC: 31.9 G/DL (ref 31–37)
MCV RBC AUTO: 90.2 FL (ref 80–100)
MONOCYTES # BLD AUTO: 0.54 X10(3) UL (ref 0.1–1)
MONOCYTES NFR BLD AUTO: 8.1 %
NEUTROPHILS # BLD AUTO: 5.14 X10 (3) UL (ref 1.5–7.7)
NEUTROPHILS # BLD AUTO: 5.14 X10(3) UL (ref 1.5–7.7)
NEUTROPHILS NFR BLD AUTO: 77 %
OSMOLALITY SERPL CALC.SUM OF ELEC: 290 MOSM/KG (ref 275–295)
PLATELET # BLD AUTO: 293 10(3)UL (ref 150–450)
POTASSIUM SERPL-SCNC: 3.6 MMOL/L (ref 3.5–5.1)
PSA SERPL DL<=0.01 NG/ML-MCNC: 44.9 SECONDS (ref 12.4–14.6)
RBC # BLD AUTO: 3.68 X10(6)UL (ref 3.8–5.3)
SODIUM SERPL-SCNC: 141 MMOL/L (ref 136–145)
WBC # BLD AUTO: 6.7 X10(3) UL (ref 4–11)

## 2020-08-06 PROCEDURE — 85610 PROTHROMBIN TIME: CPT | Performed by: HOSPITALIST

## 2020-08-06 PROCEDURE — 80048 BASIC METABOLIC PNL TOTAL CA: CPT | Performed by: HOSPITALIST

## 2020-08-06 PROCEDURE — 83735 ASSAY OF MAGNESIUM: CPT | Performed by: HOSPITALIST

## 2020-08-06 PROCEDURE — 85025 COMPLETE CBC W/AUTO DIFF WBC: CPT | Performed by: HOSPITALIST

## 2020-08-06 NOTE — PAYOR COMM NOTE
--------------  OBS TO INPT 8/6  Appropriate for inpatient status per guidelines for bloody diarrhea (7-9 episodes/day) consistent with ulcerative colitis. Hx immunosuppressed on Humira.        ADMISSION REVIEW     Payor: [address] [place] [address] Drive #: when she has been on steroids she has improved however, the patient has been reluctant to go back on them because she does not like their side effects.   She feels generally tired nauseous has abdominal cramping and pain from time to time copious daily diar nursing note reviewed. Constitutional:       General: She is not in acute distress. Appearance: She is well-developed. She is ill-appearing. She is not toxic-appearing or diaphoretic.       Comments: Tearful, thin, noted muscular wasting, very dry ora Coloration: Skin is not cyanotic, jaundiced, mottled or pale. Findings: No erythema or rash. Neurological:      Mental Status: She is alert and oriented to person, place, and time. Cranial Nerves: No cranial nerve deficit.       Coordination: Co with continuous bloody diarrhea and multiple days of 7-9 episodes of diarrhea.   She states that unfortunately right now she has pancolitis associated with her ulcerative colitis and unfortunately this has been an ongoing issue for multiple monthsGastroente dehydration, hypokalemia hypomagnesemia discussed the case with the [facility] hospitalist as well      Disposition and Plan     Clinical Impression:  Ulcerative pancolitis with complication ([address]  (primary encounter diagnosis)  Dehydration  Hypokalemia  Hypomagn Warfarin  Dilaudid IV ordered PRN for pain control (pt is in agreement)      Factor V Leiden; coumadin ordered    PReventative INR >2              8/5 [facility]STR[facility]    Assessment:  1. Ulcerative pancolitis with bleeding  On humira and balsalazide.  C diff negative humira 80 mg yesterday

## 2020-08-06 NOTE — PROGRESS NOTES
[facility]  7/30/1980  [id number]   Provider:    Pino Garzon MD         Patient still with abdominal pain and bloody diarrhea. Less blood. Eating sandwich this afternoon-pain worse after eating.   Allergies:    Cephalexin 02/27/2020    Expressive aphasia   • Visual impairment       Past Surgical History:   Procedure Laterality Date   • CHOLECYSTECTOMY  07/03/2020   • COLONOSCOPY      2013   • COLONOSCOPY N/A 11/27/2019    Performed by Dieudonne Metz MD at [facility] ENDOSCOPY cyanosis. No peripheral edema appreciated. CV: RRR  PULM: CTAB  NEURO:Alert and Oriented x 3.       LAB/IMAGING RESULTS:     Lab Results   Component Value Date    WBC 6.7 08/06/2020    HGB 10.6 08/06/2020    HCT 33.2 08/06/2020    .0 08/06/2020

## 2020-08-06 NOTE — PLAN OF CARE
Assumed care of pt at [address]. Pt A/O x4. Vitals stable. Afebrile. C/o abdominal pain, PRN meds per MAR. Pt still having bloody diarrhea at times. Pt tearful this evening, emotional support rendered. IV fluids infusing. Call light within reach.  Will continue Progressing

## 2020-08-06 NOTE — CONSULTS
[facility] Dosing Service  Warfarin (Coumadin) Initial Dosing    Jacqueline Bahena is a 38year old patient for whom pharmacy has been consulted to dose warfarin (COUMADIN) for Prevention of systemic embolism by Dr. Artis Weber.   Based on this indication, goal IN

## 2020-08-06 NOTE — PLAN OF CARE
Pt is aox4 on room air no tle at pt has denied any sob or chest pain at this time. Pt has had a poor appetite no episodes of N/V but pt does have frequent bowel movements.  Pt states she has abdominal cramps after eating pt given dilaudid and pt tolerated t Obtain nutritional consult as needed  - Establish a toileting routine/schedule  - Consider collaborating with pharmacy to review patient's medication profile  Outcome: Progressing

## 2020-08-06 NOTE — PROGRESS NOTES
[facility] Hospitalist Team  Progress Note      Leigha Rai  7/30/1980    Assessment/Plan:        Bloody diarrhea, abdominal pain; due to Ulcerative colitis  GI involved in care  Steroid IV ordered  For now hold ASA.  GI is ok with continuing Warfarin  S/p humer Oral Nightly   • Balsalazide Disodium  2,250 mg Oral BID   • Pantoprazole Sodium  40 mg Oral QAM AC   • MethylPREDNISolone Sodium Succ  20 mg Intravenous Q12H     Continuous Infusions:   • sodium chloride Stopped (08/06/20 1202)     PRN: acetaminophen, ond

## 2020-08-06 NOTE — PROGRESS NOTES
[facility] Dosing Service  Warfarin (Coumadin) Subsequent Dosing    Amy Balderas is a 38year old patient for whom pharmacy is dosing warfarin (Coumadin).  Goal INR is 2-3    Recent Labs   Lab 08/02/20  1049 08/04/20  1836 08/05/20  8409 08/06/20  6676

## 2020-08-07 LAB
ANION GAP SERPL CALC-SCNC: 5 MMOL/L (ref 0–18)
BASOPHILS # BLD AUTO: 0.02 X10(3) UL (ref 0–0.2)
BASOPHILS NFR BLD AUTO: 0.3 %
BUN BLD-MCNC: 5 MG/DL (ref 7–18)
BUN/CREAT SERPL: 8.9 (ref 10–20)
CALCIUM BLD-MCNC: 8.4 MG/DL (ref 8.5–10.1)
CHLORIDE SERPL-SCNC: 110 MMOL/L (ref 98–112)
CO2 SERPL-SCNC: 26 MMOL/L (ref 21–32)
CREAT BLD-MCNC: 0.56 MG/DL (ref 0.55–1.02)
DEPRECATED RDW RBC AUTO: 45.1 FL (ref 35.1–46.3)
EOSINOPHIL # BLD AUTO: 0 X10(3) UL (ref 0–0.7)
EOSINOPHIL NFR BLD AUTO: 0 %
ERYTHROCYTE [DISTWIDTH] IN BLOOD BY AUTOMATED COUNT: 14 % (ref 11–15)
GLUCOSE BLD-MCNC: 116 MG/DL (ref 70–99)
HCT VFR BLD AUTO: 32.5 % (ref 35–48)
HGB BLD-MCNC: 10.5 G/DL (ref 12–16)
IMM GRANULOCYTES # BLD AUTO: 0.12 X10(3) UL (ref 0–1)
IMM GRANULOCYTES NFR BLD: 1.6 %
INR BLD: 4.17 (ref 0.89–1.11)
LYMPHOCYTES # BLD AUTO: 1.08 X10(3) UL (ref 1–4)
LYMPHOCYTES NFR BLD AUTO: 14.4 %
MCH RBC QN AUTO: 28.7 PG (ref 26–34)
MCHC RBC AUTO-ENTMCNC: 32.3 G/DL (ref 31–37)
MCV RBC AUTO: 88.8 FL (ref 80–100)
MONOCYTES # BLD AUTO: 0.74 X10(3) UL (ref 0.1–1)
MONOCYTES NFR BLD AUTO: 9.9 %
NEUTROPHILS # BLD AUTO: 5.52 X10 (3) UL (ref 1.5–7.7)
NEUTROPHILS # BLD AUTO: 5.52 X10(3) UL (ref 1.5–7.7)
NEUTROPHILS NFR BLD AUTO: 73.8 %
OSMOLALITY SERPL CALC.SUM OF ELEC: 290 MOSM/KG (ref 275–295)
PLATELET # BLD AUTO: 298 10(3)UL (ref 150–450)
POTASSIUM SERPL-SCNC: 3.4 MMOL/L (ref 3.5–5.1)
POTASSIUM SERPL-SCNC: 3.8 MMOL/L (ref 3.5–5.1)
PSA SERPL DL<=0.01 NG/ML-MCNC: 41.2 SECONDS (ref 12.4–14.6)
RBC # BLD AUTO: 3.66 X10(6)UL (ref 3.8–5.3)
SODIUM SERPL-SCNC: 141 MMOL/L (ref 136–145)
WBC # BLD AUTO: 7.5 X10(3) UL (ref 4–11)

## 2020-08-07 PROCEDURE — 84132 ASSAY OF SERUM POTASSIUM: CPT | Performed by: INTERNAL MEDICINE

## 2020-08-07 PROCEDURE — 85610 PROTHROMBIN TIME: CPT | Performed by: HOSPITALIST

## 2020-08-07 PROCEDURE — 76937 US GUIDE VASCULAR ACCESS: CPT

## 2020-08-07 PROCEDURE — 36410 VNPNXR 3YR/> PHY/QHP DX/THER: CPT

## 2020-08-07 PROCEDURE — 80048 BASIC METABOLIC PNL TOTAL CA: CPT | Performed by: INTERNAL MEDICINE

## 2020-08-07 PROCEDURE — 85025 COMPLETE CBC W/AUTO DIFF WBC: CPT | Performed by: INTERNAL MEDICINE

## 2020-08-07 RX ORDER — POTASSIUM CHLORIDE 20 MEQ/1
40 TABLET, EXTENDED RELEASE ORAL EVERY 4 HOURS
Status: COMPLETED | OUTPATIENT
Start: 2020-08-07 | End: 2020-08-07

## 2020-08-07 NOTE — PROGRESS NOTES
[facility] Dosing Service  Warfarin (Coumadin) Subsequent Dosing    Aniyah Mcqueen is a 38year old patient for whom pharmacy is dosing warfarin (Coumadin). Goal INR is 2-3.     Recent Labs   Lab 08/02/20  1049 08/04/20  1836 08/05/20  1055 08/06/20  8060

## 2020-08-07 NOTE — PLAN OF CARE
Problem: PAIN - ADULT  Goal: Verbalizes/displays adequate comfort level or patient's stated pain goal  Description  INTERVENTIONS:  - Encourage pt to monitor pain and request assistance  - Assess pain using appropriate pain scale  - Administer analgesics GI medications  - Encourage mobilization and activity  - Obtain nutritional consult as needed  - Establish a toileting routine/schedule  - Consider collaborating with pharmacy to review patient's medication profile  Outcome: Not Progressing   Pain meds as

## 2020-08-07 NOTE — PROGRESS NOTES
[facility]  7/30/1980  [id number]   Provider:    Eri Sims MD         Still with abdominal cramping.  Three loose bms since 3 am.  Allergies:    Cephalexin              HIVES  Ibuprofen               HIVES  Augmentin [Amoxicil CHOLECYSTECTOMY  07/03/2020   • COLONOSCOPY      2013   • COLONOSCOPY N/A 11/27/2019    Performed by Jacklyn Clemens MD at [facility] ENDOSCOPY   • DILATION/CURETTAGE,DIAGNOSTIC      2009   • LAPAROSCOPIC CHOLECYSTECTOMY N/A 7/3/2020    Performed by Mirian Nation RESULTS:            ASSESSMENT AND PLAN:   1. Ulcerative pancolitis with bleeding  On humira and balsalazide. C diff negative.    - continue iv solumedrol 20 mg bid  - continue balsalazide  - monitor hgb daily  - ok to continue warfarin  - given humira 80 m

## 2020-08-07 NOTE — PROGRESS NOTES
[facility] Hospitalist Team  Progress Note      Orestes Tatum  7/30/1980    Assessment/Plan:        Bloody diarrhea, abdominal pain; due to Ulcerative colitis  GI involved in care  Steroid IV ordered  For now hold ASA.  GI is ok with continuing Warfarin  S/p humjarek ALT 17 14   AST 44* 29   ALB 2.8* 2.4*       No results for input(s): PGLU in the last 168 hours.     Meds:   Scheduled:   • Potassium Chloride ER  40 mEq Oral Q4H   • atorvastatin  40 mg Oral Nightly   • Balsalazide Disodium  2,250 mg Oral BID   • Pantop

## 2020-08-07 NOTE — DIETARY NOTE
[facility]    NUTRITION FOLLOW UP ASSESSMENT    NUTRITION DIAGNOSIS/PROBLEM:    Unintentional weight loss related to physiologic causes increasing nutrient needs and possible inadequate energy intake as evidenced by significant wt loss (~20 lb, 10.5% lb)  06/24/20 : 86.2 kg (190 lb)  06/01/20 : 88.2 kg (194 lb 6.4 oz)  05/18/20 : 86.6 kg (191 lb)    NUTRITION:  Diet: Regular  Oral Supplements: Ensure Clear BID; at breakfast and dinner    FOOD/NUTRITION RELATED HISTORY:  Appetite: fair  Intake: 50%, 100

## 2020-08-08 LAB
ANION GAP SERPL CALC-SCNC: 3 MMOL/L (ref 0–18)
BASOPHILS # BLD AUTO: 0.01 X10(3) UL (ref 0–0.2)
BASOPHILS NFR BLD AUTO: 0.1 %
BUN BLD-MCNC: 4 MG/DL (ref 7–18)
BUN/CREAT SERPL: 6.9 (ref 10–20)
CALCIUM BLD-MCNC: 8.1 MG/DL (ref 8.5–10.1)
CHLORIDE SERPL-SCNC: 109 MMOL/L (ref 98–112)
CO2 SERPL-SCNC: 27 MMOL/L (ref 21–32)
CREAT BLD-MCNC: 0.58 MG/DL (ref 0.55–1.02)
DEPRECATED RDW RBC AUTO: 45.2 FL (ref 35.1–46.3)
EOSINOPHIL # BLD AUTO: 0 X10(3) UL (ref 0–0.7)
EOSINOPHIL NFR BLD AUTO: 0 %
ERYTHROCYTE [DISTWIDTH] IN BLOOD BY AUTOMATED COUNT: 13.9 % (ref 11–15)
GLUCOSE BLD-MCNC: 116 MG/DL (ref 70–99)
HCT VFR BLD AUTO: 30.6 % (ref 35–48)
HGB BLD-MCNC: 10.1 G/DL (ref 12–16)
IMM GRANULOCYTES # BLD AUTO: 0.25 X10(3) UL (ref 0–1)
IMM GRANULOCYTES NFR BLD: 3.6 %
INR BLD: 3.2 (ref 0.89–1.11)
LYMPHOCYTES # BLD AUTO: 1.34 X10(3) UL (ref 1–4)
LYMPHOCYTES NFR BLD AUTO: 19.4 %
MCH RBC QN AUTO: 29.6 PG (ref 26–34)
MCHC RBC AUTO-ENTMCNC: 33 G/DL (ref 31–37)
MCV RBC AUTO: 89.7 FL (ref 80–100)
MONOCYTES # BLD AUTO: 0.86 X10(3) UL (ref 0.1–1)
MONOCYTES NFR BLD AUTO: 12.4 %
NEUTROPHILS # BLD AUTO: 4.46 X10 (3) UL (ref 1.5–7.7)
NEUTROPHILS # BLD AUTO: 4.46 X10(3) UL (ref 1.5–7.7)
NEUTROPHILS NFR BLD AUTO: 64.5 %
OSMOLALITY SERPL CALC.SUM OF ELEC: 286 MOSM/KG (ref 275–295)
PLATELET # BLD AUTO: 293 10(3)UL (ref 150–450)
POTASSIUM SERPL-SCNC: 4.2 MMOL/L (ref 3.5–5.1)
PSA SERPL DL<=0.01 NG/ML-MCNC: 33.5 SECONDS (ref 12.4–14.6)
RBC # BLD AUTO: 3.41 X10(6)UL (ref 3.8–5.3)
SODIUM SERPL-SCNC: 139 MMOL/L (ref 136–145)
WBC # BLD AUTO: 6.9 X10(3) UL (ref 4–11)

## 2020-08-08 PROCEDURE — 85610 PROTHROMBIN TIME: CPT | Performed by: INTERNAL MEDICINE

## 2020-08-08 PROCEDURE — 80048 BASIC METABOLIC PNL TOTAL CA: CPT | Performed by: INTERNAL MEDICINE

## 2020-08-08 PROCEDURE — 85025 COMPLETE CBC W/AUTO DIFF WBC: CPT | Performed by: INTERNAL MEDICINE

## 2020-08-08 RX ORDER — WARFARIN SODIUM 5 MG/1
5 TABLET ORAL
Status: COMPLETED | OUTPATIENT
Start: 2020-08-08 | End: 2020-08-08

## 2020-08-08 RX ORDER — DICYCLOMINE HCL 20 MG
20 TABLET ORAL
Status: DISCONTINUED | OUTPATIENT
Start: 2020-08-08 | End: 2020-08-09

## 2020-08-08 NOTE — PROGRESS NOTES
[facility] Hospitalist Team  Progress Note      Amy Balderas  7/30/1980    Assessment/Plan:        Bloody diarrhea, abdominal pain; due to Ulcerative colitis  GI involved in care  Steroid IV ordered  For now hold ASA.  GI is ok with continuing Warfarin  S/p humer 5*  --  4*   CREATSERUM 0.66 0.48* 0.56 0.56  --  0.58   CA 7.5* 7.5* 8.1* 8.4*  --  8.1*   MG 1.4*  --  2.1  --   --   --    GLU 76 111* 126* 116*  --  116*       Recent Labs   Lab 08/02/20  1049 08/04/20  1836   ALT 17 14   AST 44* 29   ALB 2.8* 2.4*

## 2020-08-08 NOTE — PLAN OF CARE
Problem: PAIN - ADULT  Goal: Verbalizes/displays adequate comfort level or patient's stated pain goal  Description  INTERVENTIONS:  - Encourage pt to monitor pain and request assistance  - Assess pain using appropriate pain scale  - Administer analgesics medications  - Encourage mobilization and activity  - Obtain nutritional consult as needed  - Establish a toileting routine/schedule  - Consider collaborating with pharmacy to review patient's medication profile  Outcome: Not Progressing   Denies pain meds

## 2020-08-08 NOTE — PROGRESS NOTES
[address] [facility]  7/30/1980  [id number]   Provider:    January Wright MD         Few loose bms with less blood overnight.   Allergies:    Cephalexin              HIVES  Ibuprofen               HIVES  Augmentin [Amoxicil*    NAUSEA AND VO Surgical History:   Procedure Laterality Date   • CHOLECYSTECTOMY  07/03/2020   • COLONOSCOPY      2013   • COLONOSCOPY N/A 11/27/2019    Performed by Corene Bloch, MD at [facility] ENDOSCOPY   • DILATION/CURETTAGE,DIAGNOSTIC      2009   • [address] CTAB  NEURO:Alert and Oriented x 3.       LAB/IMAGING RESULTS:     Lab Results   Component Value Date    WBC 6.9 08/08/2020    HGB 10.1 08/08/2020    HCT 30.6 08/08/2020    .0 08/08/2020    CREATSERUM 0.58 08/08/2020    BUN 4 08/08/2020     08/

## 2020-08-08 NOTE — PLAN OF CARE
Patient alert and oriented x 4.  VS stable. Mild complaints of pain, controlled with IV Dilaudid. Abdomen soft and nontender. Increased appetite for dinner, able to eat some amounts with minimal n/v.   One loose bowel movement overnight with decreased bl

## 2020-08-08 NOTE — CONSULTS
Pharmacy Dosing Service: Warfarin (Coumadin)    Christy Decker is a 38year old female for whom pharmacy has been dosing warfarin (Coumadin).  Goal INR is 2-3    Recent Labs   Lab 08/04/20  1836 08/05/20  7646 08/06/20  0622 08/07/20  0758 08/08/20  0559   I

## 2020-08-09 VITALS
HEART RATE: 61 BPM | OXYGEN SATURATION: 100 % | TEMPERATURE: 98 F | DIASTOLIC BLOOD PRESSURE: 61 MMHG | BODY MASS INDEX: 27.59 KG/M2 | WEIGHT: 171.69 LBS | HEIGHT: 66 IN | SYSTOLIC BLOOD PRESSURE: 102 MMHG | RESPIRATION RATE: 20 BRPM

## 2020-08-09 LAB
INR BLD: 1.56 (ref 0.89–1.11)
PSA SERPL DL<=0.01 NG/ML-MCNC: 19.1 SECONDS (ref 12.4–14.6)

## 2020-08-09 PROCEDURE — 85610 PROTHROMBIN TIME: CPT | Performed by: INTERNAL MEDICINE

## 2020-08-09 RX ORDER — PREDNISONE 20 MG/1
40 TABLET ORAL DAILY
Qty: 28 TABLET | Refills: 0 | Status: SHIPPED | OUTPATIENT
Start: 2020-08-09 | End: 2020-08-23 | Stop reason: WASHOUT

## 2020-08-09 RX ORDER — DICYCLOMINE HCL 20 MG
20 TABLET ORAL
Qty: 28 TABLET | Refills: 0 | Status: SHIPPED | OUTPATIENT
Start: 2020-08-09 | End: 2020-08-16

## 2020-08-09 NOTE — PLAN OF CARE
Patient alert and oriented x 4. VS stable. Mild complaints of pain, controlled with IV Dilaudid. Abdomen soft and nontender. Increased appetite for dinner, able to eat some amounts with minimal n/v, Reglan given PRN. One loose bowel movement overnight.   Fa

## 2020-08-09 NOTE — DISCHARGE SUMMARY
[facility] Internal Medicine Discharge Summary   Patient ID:  Anay Naqvi  [id number]  31 year old  7/30/1980    Admit date: 8/4/2020    Discharge date and time: 8/9/2020     Attending Physician: Vita Cole MD     Primary Care Physician: Lianne Bonilla 6BMS yesterday, now 2-3. Minimal blood. Walked. Some abd cramp/pain but was hungry last night.  SBP 90-110s    Gen: No acute distress, alert and oriented  CV: RRR, +s1/s2  Lungs: CTAB, good respiratory effort  Abdomen: s/nt/nd  Ext: Moves all 4 extremities, Take 1 tablet (5 mg total) by mouth every 6 (six) hours as needed. ondansetron 4 MG Tbdp  Commonly known as:  ZOFRAN-ODT  Take 1 tablet (4 mg total) by mouth every 4 (four) hours as needed for Nausea.      Pantoprazole Sodium 40 MG Tbec  Commonly known PROCEDURE:  XR ABDOMEN OBSTRUCTIVE SERIES ROUTINE(2 VW)(CPT=74019)  TECHNIQUE:  2 view obstructive series of the abdomen and pelvis were obtained.   COMPARISON:  [facility] , CT, CT ABDOMEN PELVIS IV CONTRAST, NO ORAL (ER), 6/24/2020, 1:40 PM.  INDICATIONS:  na

## 2020-08-09 NOTE — PROGRESS NOTES
[address] [facility]  7/30/1980  [id number]   Provider:    Avel Dunn MD         Six loose bms yesterday-less blood. Still with abdominal pain after eating.   Allergies:    Cephalexin              HIVES  Ibuprofen               HIVES  Au Laterality Date   • CHOLECYSTECTOMY  07/03/2020   • COLONOSCOPY      2013   • COLONOSCOPY N/A 11/27/2019    Performed by Bailee Felton MD at [facility] ENDOSCOPY   • DILATION/CURETTAGE,DIAGNOSTIC      2009   • LAPAROSCOPIC CHOLECYSTECTOMY N/A 7/3/2020    Perf 3.      LAB/IMAGING RESULTS:     Lab Results   Component Value Date    INR 1.56 08/09/2020    PTP 19.1 08/09/2020         ASSESSMENT AND PLAN:   1. Ulcerative pancolitis with bleeding  On humira and balsalazide. C diff negative.  Slow improvement on iv ster

## 2020-08-09 NOTE — PROGRESS NOTES
Agustin Felisa was under my care at [facility] from 8/4/2020 through 8/9/2020. Please excuse her from work through 8/12. If feeling better 8/11, ok to return earlier.      Asim Patient, MD  [facility] Hospitalist  [phone]  8/9/2020  12:56 PM

## 2020-08-09 NOTE — PLAN OF CARE
NURSING DISCHARGE NOTE    Discharged Home via Wheelchair. Accompanied by Family member and Support staff  Belongings Taken by patient/family. Went over discharge instructions. All questions anwsered. IV removed.

## 2020-08-09 NOTE — PLAN OF CARE
Pt alert and oriented x4. Pt denies SOB and VSS. C/o pain after eating, IV dilaudid given with relief. Pt ambulating to bathroom. Less diarrhea and less bloody per pt. Plan is for d/c today. Pt updated on POC. Will continue to monitor.

## 2020-08-10 NOTE — PAYOR COMM NOTE
--------------  DISCHARGE REVIEW    Payor: [address] [place] [address] Drive #:  [id number]  Authorization Number: [id number]    Admit date: 8/6/20  Admit time:  8252  Discharge Date: 8/9/2020  2:03 PM     Admitting Physician: Sonido Katz MD  Attending P -resume coumadin 10mg tonight, f/u c coumadin clinic tmrw  -on asa     Anemia: likely dilutional and from bleeding, cont to monitor  - stable today 10.5 to 10.1     PReventative coumadin INR now 1.5    Day of discharge Exam    08/09/20  1122   BP: 102/61 HYDROcodone-acetaminophen 5-325 MG Tabs  Commonly known as:  Norco  Take 1-2 tablets by mouth every 6 (six) hours as needed for Pain.      Hydrocortisone Acetate 25 MG Supp  Commonly known as:  ANUSOL-HC  Place 1 suppository (25 mg total) rectally every paul PROCEDURE:  XR ABDOMEN OBSTRUCTIVE SERIES ROUTINE(2 VW)(CPT=74019)  TECHNIQUE:  2 view obstructive series of the abdomen and pelvis were obtained.   COMPARISON:  [facility] , CT, CT ABDOMEN PELVIS IV CONTRAST, NO ORAL (ER), 6/24/2020, 1:40 PM.  INDICATIONS:  na

## 2020-12-11 PROBLEM — Z86.73 HISTORY OF STROKE: Status: ACTIVE | Noted: 2020-02-27

## 2021-02-03 ENCOUNTER — LAB ENCOUNTER (OUTPATIENT)
Dept: LAB | Age: 41
End: 2021-02-03
Attending: INTERNAL MEDICINE
Payer: COMMERCIAL

## 2021-02-03 DIAGNOSIS — D68.59 HYPERCOAGULABLE STATE (HCC): ICD-10-CM

## 2021-02-03 LAB
INR BLD: 3.38 (ref 0.88–1.11)
PSA SERPL DL<=0.01 NG/ML-MCNC: 33.9 SECONDS (ref 12–14.3)

## 2021-02-03 PROCEDURE — 85610 PROTHROMBIN TIME: CPT

## 2021-02-03 PROCEDURE — 36415 COLL VENOUS BLD VENIPUNCTURE: CPT

## 2021-02-10 ENCOUNTER — LAB ENCOUNTER (OUTPATIENT)
Dept: LAB | Age: 41
End: 2021-02-10
Attending: INTERNAL MEDICINE
Payer: COMMERCIAL

## 2021-02-10 DIAGNOSIS — K51.00 ULCERATIVE PANCOLITIS WITHOUT COMPLICATION (HCC): ICD-10-CM

## 2021-02-10 DIAGNOSIS — R10.84 GENERALIZED ABDOMINAL PAIN: ICD-10-CM

## 2021-02-10 LAB
ALBUMIN SERPL-MCNC: 3.4 G/DL (ref 3.4–5)
ALBUMIN/GLOB SERPL: 0.7 {RATIO} (ref 1–2)
ALP LIVER SERPL-CCNC: 83 U/L
ALT SERPL-CCNC: 13 U/L
ANION GAP SERPL CALC-SCNC: 5 MMOL/L (ref 0–18)
AST SERPL-CCNC: 18 U/L (ref 15–37)
BASOPHILS # BLD AUTO: 0.04 X10(3) UL (ref 0–0.2)
BASOPHILS NFR BLD AUTO: 0.6 %
BILIRUB SERPL-MCNC: 0.3 MG/DL (ref 0.1–2)
BUN BLD-MCNC: 9 MG/DL (ref 7–18)
BUN/CREAT SERPL: 14.1 (ref 10–20)
CALCIUM BLD-MCNC: 8.6 MG/DL (ref 8.5–10.1)
CHLORIDE SERPL-SCNC: 105 MMOL/L (ref 98–112)
CO2 SERPL-SCNC: 25 MMOL/L (ref 21–32)
CREAT BLD-MCNC: 0.64 MG/DL
DEPRECATED RDW RBC AUTO: 39.1 FL (ref 35.1–46.3)
EOSINOPHIL # BLD AUTO: 0.15 X10(3) UL (ref 0–0.7)
EOSINOPHIL NFR BLD AUTO: 2.1 %
ERYTHROCYTE [DISTWIDTH] IN BLOOD BY AUTOMATED COUNT: 12.3 % (ref 11–15)
GLOBULIN PLAS-MCNC: 4.8 G/DL (ref 2.8–4.4)
GLUCOSE BLD-MCNC: 83 MG/DL (ref 70–99)
HCT VFR BLD AUTO: 38.9 %
HGB BLD-MCNC: 12 G/DL
IMM GRANULOCYTES # BLD AUTO: 0.04 X10(3) UL (ref 0–1)
IMM GRANULOCYTES NFR BLD: 0.6 %
LIPASE SERPL-CCNC: 134 U/L (ref 73–393)
LYMPHOCYTES # BLD AUTO: 1.46 X10(3) UL (ref 1–4)
LYMPHOCYTES NFR BLD AUTO: 20.5 %
M PROTEIN MFR SERPL ELPH: 8.2 G/DL (ref 6.4–8.2)
MCH RBC QN AUTO: 26.8 PG (ref 26–34)
MCHC RBC AUTO-ENTMCNC: 30.8 G/DL (ref 31–37)
MCV RBC AUTO: 87 FL
MONOCYTES # BLD AUTO: 0.43 X10(3) UL (ref 0.1–1)
MONOCYTES NFR BLD AUTO: 6 %
NEUTROPHILS # BLD AUTO: 4.99 X10 (3) UL (ref 1.5–7.7)
NEUTROPHILS # BLD AUTO: 4.99 X10(3) UL (ref 1.5–7.7)
NEUTROPHILS NFR BLD AUTO: 70.2 %
OSMOLALITY SERPL CALC.SUM OF ELEC: 278 MOSM/KG (ref 275–295)
PATIENT FASTING Y/N/NP: NO
PLATELET # BLD AUTO: 353 10(3)UL (ref 150–450)
POTASSIUM SERPL-SCNC: 3.5 MMOL/L (ref 3.5–5.1)
RBC # BLD AUTO: 4.47 X10(6)UL
SODIUM SERPL-SCNC: 135 MMOL/L (ref 136–145)
WBC # BLD AUTO: 7.1 X10(3) UL (ref 4–11)

## 2021-02-10 PROCEDURE — 82397 CHEMILUMINESCENT ASSAY: CPT

## 2021-02-10 PROCEDURE — 85025 COMPLETE CBC W/AUTO DIFF WBC: CPT

## 2021-02-10 PROCEDURE — 80053 COMPREHEN METABOLIC PANEL: CPT

## 2021-02-10 PROCEDURE — 83690 ASSAY OF LIPASE: CPT

## 2021-02-10 PROCEDURE — 36415 COLL VENOUS BLD VENIPUNCTURE: CPT

## 2021-02-10 PROCEDURE — 80299 QUANTITATIVE ASSAY DRUG: CPT

## 2021-02-12 LAB
ADALIMUMAB ACTIVITY: 14.09 UG/ML
ADALIMUMAB NEUTRALIZING ANTIBODY: NOT DETECTED

## 2021-03-09 PROCEDURE — 88305 TISSUE EXAM BY PATHOLOGIST: CPT | Performed by: INTERNAL MEDICINE

## 2021-03-10 PROBLEM — K51.00 ULCERATIVE CHRONIC PANCOLITIS WITHOUT COMPLICATIONS (HCC): Status: ACTIVE | Noted: 2021-03-10

## 2021-03-12 ENCOUNTER — APPOINTMENT (OUTPATIENT)
Dept: CT IMAGING | Facility: HOSPITAL | Age: 41
End: 2021-03-12
Attending: EMERGENCY MEDICINE
Payer: COMMERCIAL

## 2021-03-12 ENCOUNTER — HOSPITAL ENCOUNTER (OUTPATIENT)
Facility: HOSPITAL | Age: 41
Setting detail: OBSERVATION
Discharge: HOME | End: 2021-03-15
Attending: EMERGENCY MEDICINE | Admitting: HOSPITALIST
Payer: COMMERCIAL

## 2021-03-12 DIAGNOSIS — R13.10 ODYNOPHAGIA: ICD-10-CM

## 2021-03-12 DIAGNOSIS — E04.1 THYROID NODULE: Primary | ICD-10-CM

## 2021-03-12 DIAGNOSIS — E86.0 DEHYDRATION: ICD-10-CM

## 2021-03-12 DIAGNOSIS — K51.00 ULCERATIVE PANCOLITIS WITHOUT COMPLICATION (HCC): ICD-10-CM

## 2021-03-12 PROBLEM — M25.519 SHOULDER PAIN: Status: ACTIVE | Noted: 2021-03-12

## 2021-03-12 LAB
ANION GAP SERPL CALC-SCNC: 5 MMOL/L (ref 0–18)
BASOPHILS # BLD AUTO: 0.01 X10(3) UL (ref 0–0.2)
BASOPHILS NFR BLD AUTO: 0.2 %
BUN BLD-MCNC: 8 MG/DL (ref 7–18)
BUN/CREAT SERPL: 10.5 (ref 10–20)
CALCIUM BLD-MCNC: 9.4 MG/DL (ref 8.5–10.1)
CHLORIDE SERPL-SCNC: 102 MMOL/L (ref 98–112)
CO2 SERPL-SCNC: 27 MMOL/L (ref 21–32)
CREAT BLD-MCNC: 0.76 MG/DL
DEPRECATED RDW RBC AUTO: 37.6 FL (ref 35.1–46.3)
EOSINOPHIL # BLD AUTO: 0.04 X10(3) UL (ref 0–0.7)
EOSINOPHIL NFR BLD AUTO: 0.7 %
ERYTHROCYTE [DISTWIDTH] IN BLOOD BY AUTOMATED COUNT: 12.5 % (ref 11–15)
GLUCOSE BLD-MCNC: 74 MG/DL (ref 70–99)
HCT VFR BLD AUTO: 39.8 %
HGB BLD-MCNC: 12.4 G/DL
IMM GRANULOCYTES # BLD AUTO: 0.03 X10(3) UL (ref 0–1)
IMM GRANULOCYTES NFR BLD: 0.5 %
INR BLD: 1.23 (ref 0.89–1.11)
LACTATE SERPL-SCNC: 1.1 MMOL/L (ref 0.4–2)
LYMPHOCYTES # BLD AUTO: 1.3 X10(3) UL (ref 1–4)
LYMPHOCYTES NFR BLD AUTO: 21.1 %
MCH RBC QN AUTO: 26.2 PG (ref 26–34)
MCHC RBC AUTO-ENTMCNC: 31.2 G/DL (ref 31–37)
MCV RBC AUTO: 84.1 FL
MONOCYTES # BLD AUTO: 0.51 X10(3) UL (ref 0.1–1)
MONOCYTES NFR BLD AUTO: 8.3 %
NEUTROPHILS # BLD AUTO: 4.26 X10 (3) UL (ref 1.5–7.7)
NEUTROPHILS # BLD AUTO: 4.26 X10(3) UL (ref 1.5–7.7)
NEUTROPHILS NFR BLD AUTO: 69.2 %
OSMOLALITY SERPL CALC.SUM OF ELEC: 275 MOSM/KG (ref 275–295)
PLATELET # BLD AUTO: 275 10(3)UL (ref 150–450)
POTASSIUM SERPL-SCNC: 3.6 MMOL/L (ref 3.5–5.1)
PSA SERPL DL<=0.01 NG/ML-MCNC: 15.9 SECONDS (ref 12.4–14.6)
RBC # BLD AUTO: 4.73 X10(6)UL
SARS-COV-2 RNA RESP QL NAA+PROBE: NOT DETECTED
SODIUM SERPL-SCNC: 134 MMOL/L (ref 136–145)
TROPONIN I SERPL-MCNC: <0.045 NG/ML (ref ?–0.04)
WBC # BLD AUTO: 6.2 X10(3) UL (ref 4–11)

## 2021-03-12 PROCEDURE — 93005 ELECTROCARDIOGRAM TRACING: CPT

## 2021-03-12 PROCEDURE — 84484 ASSAY OF TROPONIN QUANT: CPT | Performed by: EMERGENCY MEDICINE

## 2021-03-12 PROCEDURE — 71250 CT THORAX DX C-: CPT | Performed by: EMERGENCY MEDICINE

## 2021-03-12 PROCEDURE — 93010 ELECTROCARDIOGRAM REPORT: CPT

## 2021-03-12 PROCEDURE — 96374 THER/PROPH/DIAG INJ IV PUSH: CPT

## 2021-03-12 PROCEDURE — 96361 HYDRATE IV INFUSION ADD-ON: CPT

## 2021-03-12 PROCEDURE — 85610 PROTHROMBIN TIME: CPT | Performed by: HOSPITALIST

## 2021-03-12 PROCEDURE — 83605 ASSAY OF LACTIC ACID: CPT | Performed by: EMERGENCY MEDICINE

## 2021-03-12 PROCEDURE — 93010 ELECTROCARDIOGRAM REPORT: CPT | Performed by: INTERNAL MEDICINE

## 2021-03-12 PROCEDURE — 80048 BASIC METABOLIC PNL TOTAL CA: CPT | Performed by: EMERGENCY MEDICINE

## 2021-03-12 PROCEDURE — C9113 INJ PANTOPRAZOLE SODIUM, VIA: HCPCS | Performed by: HOSPITALIST

## 2021-03-12 PROCEDURE — 85025 COMPLETE CBC W/AUTO DIFF WBC: CPT | Performed by: EMERGENCY MEDICINE

## 2021-03-12 PROCEDURE — S0028 INJECTION, FAMOTIDINE, 20 MG: HCPCS | Performed by: EMERGENCY MEDICINE

## 2021-03-12 PROCEDURE — 99285 EMERGENCY DEPT VISIT HI MDM: CPT

## 2021-03-12 RX ORDER — WARFARIN SODIUM 5 MG/1
10 TABLET ORAL SEE ADMIN INSTRUCTIONS
COMMUNITY

## 2021-03-12 RX ORDER — BUTALBITAL, ACETAMINOPHEN AND CAFFEINE 50; 325; 40 MG/1; MG/1; MG/1
1 TABLET ORAL EVERY 6 HOURS PRN
COMMUNITY

## 2021-03-12 RX ORDER — ENOXAPARIN SODIUM 100 MG/ML
80 INJECTION SUBCUTANEOUS 2 TIMES DAILY
Status: DISCONTINUED | OUTPATIENT
Start: 2021-03-12 | End: 2021-03-14

## 2021-03-12 RX ORDER — ATORVASTATIN CALCIUM 40 MG/1
40 TABLET, FILM COATED ORAL NIGHTLY
Status: DISCONTINUED | OUTPATIENT
Start: 2021-03-12 | End: 2021-03-15

## 2021-03-12 RX ORDER — ADALIMUMAB 40MG/0.8ML
80 KIT SUBCUTANEOUS
COMMUNITY
End: 2021-04-09 | Stop reason: ALTCHOICE

## 2021-03-12 RX ORDER — ALBUTEROL SULFATE 90 UG/1
2 AEROSOL, METERED RESPIRATORY (INHALATION) EVERY 4 HOURS PRN
COMMUNITY

## 2021-03-12 RX ORDER — MAGNESIUM HYDROXIDE/ALUMINUM HYDROXICE/SIMETHICONE 120; 1200; 1200 MG/30ML; MG/30ML; MG/30ML
30 SUSPENSION ORAL ONCE
Status: COMPLETED | OUTPATIENT
Start: 2021-03-12 | End: 2021-03-12

## 2021-03-12 RX ORDER — WARFARIN SODIUM 5 MG/1
12.5 TABLET ORAL
Status: DISCONTINUED | OUTPATIENT
Start: 2021-03-12 | End: 2021-03-15

## 2021-03-12 RX ORDER — DICYCLOMINE HCL 20 MG
20 TABLET ORAL 3 TIMES DAILY PRN
Status: DISCONTINUED | OUTPATIENT
Start: 2021-03-12 | End: 2021-03-15

## 2021-03-12 RX ORDER — ALBUTEROL SULFATE 90 UG/1
2 AEROSOL, METERED RESPIRATORY (INHALATION) EVERY 4 HOURS PRN
Status: DISCONTINUED | OUTPATIENT
Start: 2021-03-12 | End: 2021-03-15

## 2021-03-12 RX ORDER — WARFARIN SODIUM 5 MG/1
5 TABLET ORAL SEE ADMIN INSTRUCTIONS
COMMUNITY

## 2021-03-12 RX ORDER — HYDROMORPHONE HYDROCHLORIDE 1 MG/ML
0.8 INJECTION, SOLUTION INTRAMUSCULAR; INTRAVENOUS; SUBCUTANEOUS EVERY 2 HOUR PRN
Status: DISCONTINUED | OUTPATIENT
Start: 2021-03-12 | End: 2021-03-15

## 2021-03-12 RX ORDER — WARFARIN SODIUM 5 MG/1
10 TABLET ORAL
Status: DISCONTINUED | OUTPATIENT
Start: 2021-03-13 | End: 2021-03-15

## 2021-03-12 RX ORDER — DEXTROSE AND SODIUM CHLORIDE 5; .45 G/100ML; G/100ML
INJECTION, SOLUTION INTRAVENOUS CONTINUOUS
Status: ACTIVE | OUTPATIENT
Start: 2021-03-12 | End: 2021-03-12

## 2021-03-12 RX ORDER — SODIUM CHLORIDE 9 MG/ML
INJECTION, SOLUTION INTRAVENOUS CONTINUOUS
Status: DISCONTINUED | OUTPATIENT
Start: 2021-03-12 | End: 2021-03-15

## 2021-03-12 RX ORDER — ASPIRIN 81 MG/1
81 TABLET ORAL DAILY
Status: DISCONTINUED | OUTPATIENT
Start: 2021-03-13 | End: 2021-03-15

## 2021-03-12 RX ORDER — LIDOCAINE HYDROCHLORIDE 20 MG/ML
10 SOLUTION OROPHARYNGEAL ONCE
Status: COMPLETED | OUTPATIENT
Start: 2021-03-12 | End: 2021-03-12

## 2021-03-12 RX ORDER — ONDANSETRON 2 MG/ML
4 INJECTION INTRAMUSCULAR; INTRAVENOUS EVERY 6 HOURS PRN
Status: DISCONTINUED | OUTPATIENT
Start: 2021-03-12 | End: 2021-03-15

## 2021-03-12 RX ORDER — CYCLOBENZAPRINE HCL 10 MG
10 TABLET ORAL 2 TIMES DAILY PRN
Status: DISCONTINUED | OUTPATIENT
Start: 2021-03-12 | End: 2021-03-15

## 2021-03-12 RX ORDER — ACETAMINOPHEN 325 MG/1
650 TABLET ORAL EVERY 6 HOURS PRN
Status: DISCONTINUED | OUTPATIENT
Start: 2021-03-12 | End: 2021-03-15

## 2021-03-12 RX ORDER — ALBUTEROL SULFATE 2.5 MG/3ML
3 SOLUTION RESPIRATORY (INHALATION) 4 TIMES DAILY PRN
Status: DISCONTINUED | OUTPATIENT
Start: 2021-03-12 | End: 2021-03-15

## 2021-03-12 RX ORDER — BALSALAZIDE DISODIUM 750 MG/1
2250 CAPSULE ORAL 2 TIMES DAILY
COMMUNITY
End: 2021-11-17

## 2021-03-12 RX ORDER — BALSALAZIDE DISODIUM 750 MG/1
2250 CAPSULE ORAL 2 TIMES DAILY
Status: DISCONTINUED | OUTPATIENT
Start: 2021-03-12 | End: 2021-03-15

## 2021-03-12 RX ORDER — BUDESONIDE 3 MG/1
9 CAPSULE, COATED PELLETS ORAL EVERY MORNING
Status: DISCONTINUED | OUTPATIENT
Start: 2021-03-13 | End: 2021-03-15

## 2021-03-12 RX ORDER — FAMOTIDINE 10 MG/ML
20 INJECTION, SOLUTION INTRAVENOUS ONCE
Status: COMPLETED | OUTPATIENT
Start: 2021-03-12 | End: 2021-03-12

## 2021-03-12 RX ORDER — HYDROMORPHONE HYDROCHLORIDE 1 MG/ML
0.4 INJECTION, SOLUTION INTRAMUSCULAR; INTRAVENOUS; SUBCUTANEOUS EVERY 2 HOUR PRN
Status: DISCONTINUED | OUTPATIENT
Start: 2021-03-12 | End: 2021-03-15

## 2021-03-12 RX ORDER — HYDROMORPHONE HYDROCHLORIDE 1 MG/ML
0.2 INJECTION, SOLUTION INTRAMUSCULAR; INTRAVENOUS; SUBCUTANEOUS EVERY 2 HOUR PRN
Status: DISCONTINUED | OUTPATIENT
Start: 2021-03-12 | End: 2021-03-15

## 2021-03-12 NOTE — ED INITIAL ASSESSMENT (HPI)
Patient presents with bilateral shoulder pain, and worsening sore throat since EGD/c-scope on Tuesday. They performed balloon dilation as well as biopsies, both upper and lower. Patient reports pain with swallowing. She contacted GI who referred her to GI.

## 2021-03-12 NOTE — H&P
.  CC: Patient presents with:  Postop/Procedure Problem  Sore Throat  Pain       PCP: Avel Dunn MD    History of Present Illness: Patient is a 38year old female with h/o ulcerative colitis  egd with balloon dilation and and c-scope with biopsies Comment:Hepatotoxicity  Clindamycin             HIVES  Morphine                HIVES, SWELLING  Sulfa Antibiotics       HIVES  Theophylline                 Home Medications:  No outpatient medications have been marked as taking for the 3/12/21 encounter (H for input(s): ALT, AST, ALB, AMYLASE, LIPASE, LDH in the last 168 hours.     Invalid input(s): ALPHOS, TBIL, DBIL, TPROT    Recent Labs   Lab 03/12/21  1546   TROP <0.045       Additional Diagnostics: personally reviewed EKG- nsr (computer interpretation st pneumomediastinum or pneumothorax. There is minimal pericardial thickening identified.    Dictated by (CST): Andres Keith MD on 3/12/2021 at 4:37 PM     Finalized by (CST): Andres Keith MD on 3/12/2021 at 4:46 PM          Available outpatient r

## 2021-03-12 NOTE — ED PROVIDER NOTES
Patient Seen in: [facility] Emergency Department      History   Patient presents with:  Postop/Procedure Problem  Sore Throat  Pain    Stated Complaint: Right shoulder pain, sore throat. EGD performed 3 days ago.      HPI/Subjective:   HPI    Patient Packs/day: 0.00        Quit date: 2000        Years since quittin.7      Smokeless tobacco: Never Used    Vaping Use      Vaping Use: Never used    Alcohol use: Yes    Drug use:  No             Review of Systems    Positive for stated complaint: Ri for panel order CBC WITH DIFFERENTIAL WITH PLATELET.   Procedure                               Abnormality         Status                     ---------                               -----------         ------                     CBC W/ [id number] identified. No obvious esophageal abnormality identified. CARDIAC:  There is minimal pericardial thickening noted. There is no cardiomegaly. PLEURA:  No pleural effusion or pneumothorax. THORACIC AORTA:  Unremarkable as seen on non-contrast imaging.  CHES MD  [address] ([place] [address]) [phone]                Medications Prescribed:  Current Discharge Medication List                          Hospital Problems     Present on Admission  Date Reviewed: 3/12/2021    None

## 2021-03-13 ENCOUNTER — APPOINTMENT (OUTPATIENT)
Dept: CT IMAGING | Facility: HOSPITAL | Age: 41
End: 2021-03-13
Attending: HOSPITALIST
Payer: COMMERCIAL

## 2021-03-13 LAB
C DIFF TOX B STL QL: NEGATIVE
INR BLD: 1.64 (ref 0.89–1.11)
PSA SERPL DL<=0.01 NG/ML-MCNC: 19.9 SECONDS (ref 12.4–14.6)
TSI SER-ACNC: 1.96 MIU/ML (ref 0.36–3.74)

## 2021-03-13 PROCEDURE — 85610 PROTHROMBIN TIME: CPT | Performed by: HOSPITALIST

## 2021-03-13 PROCEDURE — 84443 ASSAY THYROID STIM HORMONE: CPT | Performed by: HOSPITALIST

## 2021-03-13 PROCEDURE — 71275 CT ANGIOGRAPHY CHEST: CPT | Performed by: HOSPITALIST

## 2021-03-13 PROCEDURE — C9113 INJ PANTOPRAZOLE SODIUM, VIA: HCPCS | Performed by: HOSPITALIST

## 2021-03-13 PROCEDURE — 87493 C DIFF AMPLIFIED PROBE: CPT | Performed by: INTERNAL MEDICINE

## 2021-03-13 PROCEDURE — 36410 VNPNXR 3YR/> PHY/QHP DX/THER: CPT

## 2021-03-13 PROCEDURE — 93005 ELECTROCARDIOGRAM TRACING: CPT

## 2021-03-13 PROCEDURE — 76937 US GUIDE VASCULAR ACCESS: CPT

## 2021-03-13 NOTE — PLAN OF CARE
Pt A&Ox4. RA. Pt denies shortness of breath and calf pain. Pt has complaints of upper chest and L. Upper back gas pain, PRN dilaudid given. Pt denies any N/V. Pt has a hx of ulcerative colitis with diarrhea being her baseline.  Pt tolerating clear liquid di Progressing     Problem: DISCHARGE PLANNING  Goal: Discharge to home or other facility with appropriate resources  Description: INTERVENTIONS:  - Identify barriers to discharge w/pt and caregiver  - Include patient/family/discharge partner in discharge sofie

## 2021-03-13 NOTE — CONSULTS
[facility] Patient Status:  Observation    1980 MRN [id number]   [facility] 3NW-A Attending Betty Good MD   Hosp Day # 0 PCP Laxmi Pi, MD     Brandin Brar is a 38year old female for dysphagia consul Tobacco Use      Smoking status: Former Smoker        Packs/day: 0.00        Quit date: 2000        Years since quittin.7      Smokeless tobacco: Never Used    Vaping Use      Vaping Use: Never used    Alcohol use: Yes    Drug use: No    Occupatio CXR no mediastinal air, no crepitus on exam.    1.  Cont clears. 2.  Advance to full as tolerated tomorrw    The patient indicates understanding of these issues and agrees to the plan.     Copy Dr Neto Nayak MD  3/13/2021  8:16 AM

## 2021-03-13 NOTE — PROGRESS NOTES
[facility] Hospitalist note    PCP: Shahid Dobbs MD    Chief Complaint:  F/u chest/shoulder/throat pain    SUBJECTIVE:  Pt able to drink fluids, pills still feel difficult to get down. Pain has continued, dilaudid helps briefly.   Overall she does not f lidocaine-menthol  1 patch Transdermal Daily   • Enoxaparin Sodium  80 mg Subcutaneous BID   • Warfarin Sodium  10 mg Oral Once per day on Sun Tue Wed Thu Sat   • maalox/diphenhydramine/lidocaine viscous  5 mL Oral TID & HS     • sodium chloride 100 mL/hr [phone]

## 2021-03-14 LAB
INR BLD: 2.14 (ref 0.89–1.11)
PSA SERPL DL<=0.01 NG/ML-MCNC: 24.5 SECONDS (ref 12.4–14.6)

## 2021-03-14 PROCEDURE — 85610 PROTHROMBIN TIME: CPT | Performed by: HOSPITALIST

## 2021-03-14 PROCEDURE — C9113 INJ PANTOPRAZOLE SODIUM, VIA: HCPCS | Performed by: HOSPITALIST

## 2021-03-14 RX ORDER — ENOXAPARIN SODIUM 100 MG/ML
80 INJECTION SUBCUTANEOUS 2 TIMES DAILY
Status: COMPLETED | OUTPATIENT
Start: 2021-03-14 | End: 2021-03-14

## 2021-03-14 NOTE — PROGRESS NOTES
[facility]  Progress Note    Radha Fish Patient Status:  Observation    1980 MRN [id number]   [facility] 3NW-A Attending Jeni Harper MD   Hosp Day # 0 PCP Palma Razo MD     Subjective:  Radha Fish is a(n) 40 year

## 2021-03-14 NOTE — PLAN OF CARE
Upon assessment pt is a&o x4, VSS and afebrile. RA, . Tele- NSR/ ST. SCDs/ Lovenox. Clear liquid diet, denies n/v at this time. Voids freely. Pt reports episodes of loose stool - has hx of UC and diarrhea is baseline / cdiff negative.  Pt c/o epigastric assistive devices as appropriate  - Consider OT/PT consult to assist with strengthening/mobility  - Encourage toileting schedule  Outcome: Progressing     Problem: DISCHARGE PLANNING  Goal: Discharge to home or other facility with appropriate resources  [place]

## 2021-03-14 NOTE — PROGRESS NOTES
[facility] Hospitalist note    PCP: Jaquelin Lees MD    Chief Complaint:  F/u chest/shoulder/throat pain    SUBJECTIVE:  L shoulder discomfort better  However, pain with swallowing and feeling of pills getting stuck persists and travels into chest. She s • Budesonide  9 mg Oral QAM   • Warfarin Sodium  12.5 mg Oral Once per day on Mon Fri   • aspirin  81 mg Oral Daily   • atorvastatin  40 mg Oral Nightly   • lidocaine-menthol  1 patch Transdermal Daily   • Enoxaparin Sodium  80 mg Subcutaneous BID   • Wa

## 2021-03-15 VITALS
OXYGEN SATURATION: 95 % | DIASTOLIC BLOOD PRESSURE: 59 MMHG | HEART RATE: 91 BPM | RESPIRATION RATE: 18 BRPM | BODY MASS INDEX: 28.45 KG/M2 | WEIGHT: 177 LBS | SYSTOLIC BLOOD PRESSURE: 103 MMHG | HEIGHT: 66 IN | TEMPERATURE: 99 F

## 2021-03-15 LAB
ATRIAL RATE: 116 BPM
ATRIAL RATE: 360 BPM
INR BLD: 2.78 (ref 0.89–1.11)
P AXIS: 101 DEGREES
P AXIS: 38 DEGREES
P-R INTERVAL: 122 MS
PSA SERPL DL<=0.01 NG/ML-MCNC: 30 SECONDS (ref 12.4–14.6)
Q-T INTERVAL: 308 MS
Q-T INTERVAL: 338 MS
QRS DURATION: 64 MS
QRS DURATION: 66 MS
QTC CALCULATION (BEZET): 435 MS
QTC CALCULATION (BEZET): 469 MS
R AXIS: 14 DEGREES
R AXIS: 24 DEGREES
T AXIS: 138 DEGREES
T AXIS: 49 DEGREES
VENTRICULAR RATE: 116 BPM
VENTRICULAR RATE: 120 BPM

## 2021-03-15 PROCEDURE — 85610 PROTHROMBIN TIME: CPT | Performed by: HOSPITALIST

## 2021-03-15 PROCEDURE — C9113 INJ PANTOPRAZOLE SODIUM, VIA: HCPCS | Performed by: HOSPITALIST

## 2021-03-15 RX ORDER — OMEPRAZOLE 20 MG/1
40 CAPSULE, DELAYED RELEASE ORAL
Qty: 30 CAPSULE | Refills: 11 | Status: SHIPPED | OUTPATIENT
Start: 2021-03-15 | End: 2021-04-27

## 2021-03-15 RX ORDER — HYDROCODONE BITARTRATE AND ACETAMINOPHEN 5; 325 MG/1; MG/1
1-2 TABLET ORAL EVERY 4 HOURS PRN
Qty: 30 TABLET | Refills: 0 | Status: ON HOLD | OUTPATIENT
Start: 2021-03-15 | End: 2021-05-01

## 2021-03-15 RX ORDER — HYDROCODONE BITARTRATE AND ACETAMINOPHEN 5; 325 MG/1; MG/1
1 TABLET ORAL EVERY 4 HOURS PRN
Status: DISCONTINUED | OUTPATIENT
Start: 2021-03-15 | End: 2021-03-15

## 2021-03-15 RX ORDER — HYDROCODONE BITARTRATE AND ACETAMINOPHEN 5; 325 MG/1; MG/1
2 TABLET ORAL EVERY 4 HOURS PRN
Status: DISCONTINUED | OUTPATIENT
Start: 2021-03-15 | End: 2021-03-15

## 2021-03-15 NOTE — PLAN OF CARE
Patient A&O x4, c/o pain managed with prn medication. IVF continued. Tele NSR with PVC's noted. VSS. Right arm precautions maintained. Concerns addressed, none further at this time.   Problem: PAIN - ADULT  Goal: Verbalizes/displays adequate comfort level o Identify barriers to discharge w/pt and caregiver  - Include patient/family/discharge partner in discharge planning  - Arrange for needed discharge resources and transportation as appropriate  - Identify discharge learning needs (meds, wound care, etc)  -

## 2021-03-15 NOTE — PROGRESS NOTES
To whom it may concern:        Kulwant Singh was under my care at [facility] and may return to work as early as 3/16/2021.         Sincerely,       Kathy Torres MD

## 2021-03-15 NOTE — DISCHARGE SUMMARY
General Medicine Discharge Summary     Patient ID:  Collene Mcardle  38year old  7/30/1980    Admit date: 3/12/2021    Discharge date and time:3/15/2021    Attending Physician: Naveen Hernandez MD thyroid nodule- incidentally seen on CT. tsh 5/20 nl.  Will repeat        Consults: IP CONSULT TO HOSPITALIST  IP CONSULT TO GASTROENTEROLOGY  IP CONSULT TO VASCULAR ACCESS TEAM    Operative Procedures:        Patient instructions:      Current Discharge Me 12.5 mg by mouth See Admin Instructions. Mondays and Fridays    ! ! - Potential duplicate medications found. Please discuss with provider.       STOP taking these medications    Enoxaparin Sodium (LOVENOX) 80 MG/0.8ML Subcutaneous Solution    atorvastatin 40

## 2021-03-15 NOTE — PROGRESS NOTES
NURSING DISCHARGE NOTE    Discharged Home via Wheelchair. Accompanied by Support staff  Belongings Taken by patient/family. IV taken out. Discharge Instructions given to patient. Patient verbalized understanding.  Prescription filled by Darius Nazario

## 2021-03-15 NOTE — PLAN OF CARE
Patient is alert and oriented x4. Lung sounds clear in all lobes. Patient is on room air. Telemetry with NSR. Heart rate in the 90's. Abdomen is soft and non-distended. Patient states passing gas.  Having loose stools- patient's baselines d/t ulcerative col as appropriate  - Consider OT/PT consult to assist with strengthening/mobility  - Encourage toileting schedule  Outcome: Progressing     Problem: DISCHARGE PLANNING  Goal: Discharge to home or other facility with appropriate resources  Description: Ruben Gamez

## 2021-03-15 NOTE — PROGRESS NOTES
[facility]    Progress Note    Ching Perry Patient Status:  Observation    1980 MRN [id number]   [facility] 3NW-A Attending Ron Martell MD   Hosp Day # 0 PCP Eldridge MD Shaun     Subjective:  Ching [place] is a

## 2021-03-16 NOTE — PAYOR COMM NOTE
--------------  DISCHARGE REVIEW    Payor: [address] [place] [address] Drive #:  [id number]  Authorization Number: [id number]      Discharge Date: 3/15/2021  5:49 PM     Admitting Physician: Luz Hester MD  Attending Physician:  No att. providers found  [facility]

## 2021-03-24 ENCOUNTER — LAB ENCOUNTER (OUTPATIENT)
Dept: LAB | Age: 41
End: 2021-03-24
Attending: INTERNAL MEDICINE
Payer: COMMERCIAL

## 2021-03-24 DIAGNOSIS — Z86.73 HISTORY OF STROKE: ICD-10-CM

## 2021-03-24 DIAGNOSIS — Z79.01 LONG TERM (CURRENT) USE OF ANTICOAGULANTS: ICD-10-CM

## 2021-03-24 DIAGNOSIS — D68.59 HYPERCOAGULABLE STATE (HCC): ICD-10-CM

## 2021-03-24 LAB
INR BLD: 4.73 (ref 0.88–1.11)
PSA SERPL DL<=0.01 NG/ML-MCNC: 44.1 SECONDS (ref 12–14.3)

## 2021-03-24 PROCEDURE — 85610 PROTHROMBIN TIME: CPT

## 2021-03-24 PROCEDURE — 36415 COLL VENOUS BLD VENIPUNCTURE: CPT

## 2021-03-29 ENCOUNTER — LAB ENCOUNTER (OUTPATIENT)
Dept: LAB | Facility: HOSPITAL | Age: 41
End: 2021-03-29
Attending: NURSE PRACTITIONER
Payer: COMMERCIAL

## 2021-03-29 DIAGNOSIS — Z01.818 PREOP TESTING: ICD-10-CM

## 2021-03-29 DIAGNOSIS — Z11.59 ENCOUNTER FOR SCREENING FOR OTHER VIRAL DISEASES: ICD-10-CM

## 2021-04-05 ENCOUNTER — LAB ENCOUNTER (OUTPATIENT)
Dept: LAB | Facility: HOSPITAL | Age: 41
End: 2021-04-05
Attending: NURSE PRACTITIONER
Payer: COMMERCIAL

## 2021-04-05 DIAGNOSIS — Z01.818 PREOP EXAMINATION: ICD-10-CM

## 2021-04-05 DIAGNOSIS — Z11.59 SCREENING FOR VIRAL DISEASE: ICD-10-CM

## 2021-04-09 PROBLEM — R41.0 DISORIENTATION: Status: RESOLVED | Noted: 2020-02-27 | Resolved: 2021-04-09

## 2021-04-09 PROBLEM — E83.42 HYPOMAGNESEMIA: Status: RESOLVED | Noted: 2020-08-05 | Resolved: 2021-04-09

## 2021-04-09 PROBLEM — E87.6 HYPOKALEMIA: Status: RESOLVED | Noted: 2020-02-27 | Resolved: 2021-04-09

## 2021-04-09 PROBLEM — K51.90 ULCERATIVE COLITIS (HCC): Status: RESOLVED | Noted: 2019-12-11 | Resolved: 2021-04-09

## 2021-04-09 PROBLEM — K51.019 ULCERATIVE PANCOLITIS WITH COMPLICATION (HCC): Status: RESOLVED | Noted: 2020-08-05 | Resolved: 2021-04-09

## 2021-04-09 PROBLEM — E86.0 DEHYDRATION: Status: RESOLVED | Noted: 2020-08-05 | Resolved: 2021-04-09

## 2021-04-09 PROBLEM — M25.519 SHOULDER PAIN: Status: RESOLVED | Noted: 2021-03-12 | Resolved: 2021-04-09

## 2021-04-19 ENCOUNTER — ORDER TRANSCRIPTION (OUTPATIENT)
Dept: ADMINISTRATIVE | Facility: HOSPITAL | Age: 41
End: 2021-04-19

## 2021-04-19 DIAGNOSIS — R42 DIZZINESS: ICD-10-CM

## 2021-04-19 DIAGNOSIS — R94.39 ABNORMAL STRESS TEST: Primary | ICD-10-CM

## 2021-04-19 DIAGNOSIS — R53.82 CHRONIC FATIGUE: ICD-10-CM

## 2021-04-19 DIAGNOSIS — Z86.73 HISTORY OF STROKE: ICD-10-CM

## 2021-04-20 ENCOUNTER — LAB ENCOUNTER (OUTPATIENT)
Dept: LAB | Age: 41
End: 2021-04-20
Attending: INTERNAL MEDICINE
Payer: COMMERCIAL

## 2021-04-20 DIAGNOSIS — K51.00 ULCERATIVE CHRONIC PANCOLITIS WITHOUT COMPLICATIONS (HCC): ICD-10-CM

## 2021-04-20 PROCEDURE — 85025 COMPLETE CBC W/AUTO DIFF WBC: CPT

## 2021-04-20 PROCEDURE — 36415 COLL VENOUS BLD VENIPUNCTURE: CPT

## 2021-04-20 PROCEDURE — 80053 COMPREHEN METABOLIC PANEL: CPT

## 2021-04-20 NOTE — IMAGING NOTE
Called DOUGIE Haynes [facility] and  Message left with request for pre medication for HR control  Night before and  Morning of the CTA gated coronary. Patient is scheduled for Friday 4/23/21 at 10 AM. Call back number [phone].  Patient notified of the  message

## 2021-04-21 NOTE — IMAGING NOTE
Call placed to pt regarding CTA Gated coronary arteries study on 04//23/2021. Instructed to arrive at 09:30am. Instructed to drink plenty of fluids a day prior to procedure and day of procedure. May eat a light breakfast/lunch.  Advised to hold caffeine 12

## 2021-04-23 ENCOUNTER — HOSPITAL ENCOUNTER (OUTPATIENT)
Dept: CT IMAGING | Facility: HOSPITAL | Age: 41
Discharge: HOME | End: 2021-04-23
Attending: NURSE PRACTITIONER
Payer: COMMERCIAL

## 2021-04-23 VITALS — HEART RATE: 75 BPM | RESPIRATION RATE: 17 BRPM | SYSTOLIC BLOOD PRESSURE: 103 MMHG | DIASTOLIC BLOOD PRESSURE: 68 MMHG

## 2021-04-23 DIAGNOSIS — R94.39 ABNORMAL STRESS TEST: ICD-10-CM

## 2021-04-23 DIAGNOSIS — R53.82 CHRONIC FATIGUE: ICD-10-CM

## 2021-04-23 DIAGNOSIS — Z86.73 HISTORY OF STROKE: ICD-10-CM

## 2021-04-23 DIAGNOSIS — R42 DIZZINESS: ICD-10-CM

## 2021-04-23 PROCEDURE — 75574 CT ANGIO HRT W/3D IMAGE: CPT | Performed by: INTERNAL MEDICINE

## 2021-04-23 PROCEDURE — 36410 VNPNXR 3YR/> PHY/QHP DX/THER: CPT

## 2021-04-23 PROCEDURE — 76937 US GUIDE VASCULAR ACCESS: CPT

## 2021-04-23 PROCEDURE — 75574 CT ANGIO HRT W/3D IMAGE: CPT | Performed by: NURSE PRACTITIONER

## 2021-04-23 RX ORDER — DILTIAZEM HYDROCHLORIDE 5 MG/ML
INJECTION INTRAVENOUS
Status: DISCONTINUED
Start: 2021-04-23 | End: 2021-04-23 | Stop reason: WASHOUT

## 2021-04-23 RX ORDER — SODIUM CHLORIDE 9 MG/ML
INJECTION, SOLUTION INTRAVENOUS CONTINUOUS
Status: DISCONTINUED | OUTPATIENT
Start: 2021-04-23 | End: 2021-04-25

## 2021-04-23 RX ORDER — NITROGLYCERIN 0.4 MG/1
TABLET SUBLINGUAL
Status: COMPLETED
Start: 2021-04-23 | End: 2021-04-23

## 2021-04-23 RX ORDER — METOPROLOL TARTRATE 5 MG/5ML
INJECTION INTRAVENOUS
Status: COMPLETED
Start: 2021-04-23 | End: 2021-04-23

## 2021-04-23 RX ORDER — METOPROLOL TARTRATE 5 MG/5ML
5 INJECTION INTRAVENOUS
Status: DISCONTINUED | OUTPATIENT
Start: 2021-04-23 | End: 2021-04-25

## 2021-04-23 RX ADMIN — METOPROLOL TARTRATE 5 MG: 5 INJECTION INTRAVENOUS at 12:19:00

## 2021-04-23 RX ADMIN — METOPROLOL TARTRATE 5 MG: 5 INJECTION INTRAVENOUS at 12:09:00

## 2021-04-23 NOTE — IMAGING NOTE
Pt scheduled for CT gated coronary. Denies any contrast allergies. Room # 4 used for scanning. O2 2L NC  HR 74 during scan at 1312.    75 of 0.9 NS given. 90 cc of contrast given.    Second NTG given due to trouble with injector and past peak time of

## 2021-04-27 ENCOUNTER — APPOINTMENT (OUTPATIENT)
Dept: GENERAL RADIOLOGY | Facility: HOSPITAL | Age: 41
DRG: 187 | End: 2021-04-27
Attending: EMERGENCY MEDICINE
Payer: COMMERCIAL

## 2021-04-27 ENCOUNTER — HOSPITAL ENCOUNTER (INPATIENT)
Facility: HOSPITAL | Age: 41
LOS: 6 days | Discharge: HOME | DRG: 187 | End: 2021-05-03
Attending: EMERGENCY MEDICINE | Admitting: INTERNAL MEDICINE
Payer: COMMERCIAL

## 2021-04-27 ENCOUNTER — APPOINTMENT (OUTPATIENT)
Dept: CT IMAGING | Facility: HOSPITAL | Age: 41
DRG: 187 | End: 2021-04-27
Attending: EMERGENCY MEDICINE
Payer: COMMERCIAL

## 2021-04-27 DIAGNOSIS — J90 PLEURAL EFFUSION: ICD-10-CM

## 2021-04-27 DIAGNOSIS — R07.9 ACUTE CHEST PAIN: Primary | ICD-10-CM

## 2021-04-27 DIAGNOSIS — D68.59 HYPERCOAGULABLE STATE (HCC): ICD-10-CM

## 2021-04-27 PROCEDURE — 87086 URINE CULTURE/COLONY COUNT: CPT | Performed by: EMERGENCY MEDICINE

## 2021-04-27 PROCEDURE — 87077 CULTURE AEROBIC IDENTIFY: CPT | Performed by: EMERGENCY MEDICINE

## 2021-04-27 PROCEDURE — 96374 THER/PROPH/DIAG INJ IV PUSH: CPT

## 2021-04-27 PROCEDURE — 85610 PROTHROMBIN TIME: CPT | Performed by: EMERGENCY MEDICINE

## 2021-04-27 PROCEDURE — 93010 ELECTROCARDIOGRAM REPORT: CPT

## 2021-04-27 PROCEDURE — 81001 URINALYSIS AUTO W/SCOPE: CPT | Performed by: EMERGENCY MEDICINE

## 2021-04-27 PROCEDURE — 83690 ASSAY OF LIPASE: CPT | Performed by: EMERGENCY MEDICINE

## 2021-04-27 PROCEDURE — 85025 COMPLETE CBC W/AUTO DIFF WBC: CPT | Performed by: EMERGENCY MEDICINE

## 2021-04-27 PROCEDURE — 84484 ASSAY OF TROPONIN QUANT: CPT | Performed by: EMERGENCY MEDICINE

## 2021-04-27 PROCEDURE — 71045 X-RAY EXAM CHEST 1 VIEW: CPT | Performed by: EMERGENCY MEDICINE

## 2021-04-27 PROCEDURE — 81025 URINE PREGNANCY TEST: CPT

## 2021-04-27 PROCEDURE — 99285 EMERGENCY DEPT VISIT HI MDM: CPT

## 2021-04-27 PROCEDURE — 96376 TX/PRO/DX INJ SAME DRUG ADON: CPT

## 2021-04-27 PROCEDURE — 93005 ELECTROCARDIOGRAM TRACING: CPT

## 2021-04-27 PROCEDURE — 71275 CT ANGIOGRAPHY CHEST: CPT | Performed by: EMERGENCY MEDICINE

## 2021-04-27 PROCEDURE — 85379 FIBRIN DEGRADATION QUANT: CPT | Performed by: EMERGENCY MEDICINE

## 2021-04-27 PROCEDURE — 80053 COMPREHEN METABOLIC PANEL: CPT | Performed by: EMERGENCY MEDICINE

## 2021-04-27 RX ORDER — HYDROMORPHONE HYDROCHLORIDE 1 MG/ML
0.5 INJECTION, SOLUTION INTRAMUSCULAR; INTRAVENOUS; SUBCUTANEOUS EVERY 30 MIN PRN
Status: ACTIVE | OUTPATIENT
Start: 2021-04-27 | End: 2021-04-28

## 2021-04-27 RX ORDER — SODIUM CHLORIDE 9 MG/ML
INJECTION, SOLUTION INTRAVENOUS CONTINUOUS
Status: ACTIVE | OUTPATIENT
Start: 2021-04-27 | End: 2021-04-28

## 2021-04-27 RX ORDER — HYDROMORPHONE HYDROCHLORIDE 1 MG/ML
0.5 INJECTION, SOLUTION INTRAMUSCULAR; INTRAVENOUS; SUBCUTANEOUS ONCE
Status: COMPLETED | OUTPATIENT
Start: 2021-04-27 | End: 2021-04-27

## 2021-04-27 RX ORDER — OMEPRAZOLE 20 MG/1
20 CAPSULE, DELAYED RELEASE ORAL
COMMUNITY

## 2021-04-27 NOTE — ED INITIAL ASSESSMENT (HPI)
Pt to ED with c/o right sided thoracic/ flank pain increases with deep inspiration. Pt has been being treated for pleuritis since March and has been on steroids. Pt states \"I am in pain unless I'm on steroids and I cannot be on any more steroids\".

## 2021-04-28 ENCOUNTER — APPOINTMENT (OUTPATIENT)
Dept: ULTRASOUND IMAGING | Facility: HOSPITAL | Age: 41
DRG: 187 | End: 2021-04-28
Attending: INTERNAL MEDICINE
Payer: COMMERCIAL

## 2021-04-28 PROCEDURE — 85610 PROTHROMBIN TIME: CPT | Performed by: HOSPITALIST

## 2021-04-28 PROCEDURE — 76604 US EXAM CHEST: CPT | Performed by: INTERNAL MEDICINE

## 2021-04-28 PROCEDURE — 80048 BASIC METABOLIC PNL TOTAL CA: CPT | Performed by: HOSPITALIST

## 2021-04-28 PROCEDURE — 85025 COMPLETE CBC W/AUTO DIFF WBC: CPT | Performed by: HOSPITALIST

## 2021-04-28 RX ORDER — HYDROMORPHONE HYDROCHLORIDE 1 MG/ML
0.4 INJECTION, SOLUTION INTRAMUSCULAR; INTRAVENOUS; SUBCUTANEOUS EVERY 2 HOUR PRN
Status: DISCONTINUED | OUTPATIENT
Start: 2021-04-28 | End: 2021-05-01

## 2021-04-28 RX ORDER — PANTOPRAZOLE SODIUM 40 MG/1
40 TABLET, DELAYED RELEASE ORAL
Status: DISCONTINUED | OUTPATIENT
Start: 2021-04-28 | End: 2021-05-03

## 2021-04-28 RX ORDER — ALBUTEROL SULFATE 2.5 MG/3ML
2.5 SOLUTION RESPIRATORY (INHALATION) EVERY 4 HOURS PRN
Status: DISCONTINUED | OUTPATIENT
Start: 2021-04-28 | End: 2021-05-03

## 2021-04-28 RX ORDER — ACETAMINOPHEN 325 MG/1
650 TABLET ORAL EVERY 6 HOURS PRN
Status: DISCONTINUED | OUTPATIENT
Start: 2021-04-28 | End: 2021-05-03

## 2021-04-28 RX ORDER — ZOLPIDEM TARTRATE 5 MG/1
5 TABLET ORAL NIGHTLY PRN
Status: DISCONTINUED | OUTPATIENT
Start: 2021-04-28 | End: 2021-05-03

## 2021-04-28 RX ORDER — HYDROCODONE BITARTRATE AND ACETAMINOPHEN 5; 325 MG/1; MG/1
1-2 TABLET ORAL EVERY 4 HOURS PRN
Status: DISCONTINUED | OUTPATIENT
Start: 2021-04-28 | End: 2021-05-01

## 2021-04-28 RX ORDER — ASPIRIN 81 MG/1
81 TABLET ORAL DAILY
Status: DISCONTINUED | OUTPATIENT
Start: 2021-04-28 | End: 2021-05-03

## 2021-04-28 RX ORDER — ONDANSETRON 4 MG/1
4 TABLET, ORALLY DISINTEGRATING ORAL EVERY 6 HOURS PRN
Status: DISCONTINUED | OUTPATIENT
Start: 2021-04-28 | End: 2021-05-03

## 2021-04-28 RX ORDER — BUTALBITAL, ACETAMINOPHEN AND CAFFEINE 50; 325; 40 MG/1; MG/1; MG/1
1 TABLET ORAL EVERY 6 HOURS PRN
Status: DISCONTINUED | OUTPATIENT
Start: 2021-04-28 | End: 2021-05-03

## 2021-04-28 RX ORDER — HYDROMORPHONE HYDROCHLORIDE 1 MG/ML
0.8 INJECTION, SOLUTION INTRAMUSCULAR; INTRAVENOUS; SUBCUTANEOUS EVERY 2 HOUR PRN
Status: DISCONTINUED | OUTPATIENT
Start: 2021-04-28 | End: 2021-05-01

## 2021-04-28 RX ORDER — BUDESONIDE 3 MG/1
9 CAPSULE, COATED PELLETS ORAL EVERY MORNING
Status: DISCONTINUED | OUTPATIENT
Start: 2021-04-28 | End: 2021-05-03

## 2021-04-28 RX ORDER — HYDROMORPHONE HYDROCHLORIDE 1 MG/ML
0.2 INJECTION, SOLUTION INTRAMUSCULAR; INTRAVENOUS; SUBCUTANEOUS EVERY 2 HOUR PRN
Status: DISCONTINUED | OUTPATIENT
Start: 2021-04-28 | End: 2021-05-01

## 2021-04-28 RX ORDER — ONDANSETRON 2 MG/ML
4 INJECTION INTRAMUSCULAR; INTRAVENOUS EVERY 6 HOURS PRN
Status: DISCONTINUED | OUTPATIENT
Start: 2021-04-28 | End: 2021-05-03

## 2021-04-28 RX ORDER — POTASSIUM CHLORIDE 20 MEQ/1
40 TABLET, EXTENDED RELEASE ORAL ONCE
Status: COMPLETED | OUTPATIENT
Start: 2021-04-28 | End: 2021-04-28

## 2021-04-28 RX ORDER — BALSALAZIDE DISODIUM 750 MG/1
2250 CAPSULE ORAL 2 TIMES DAILY
Status: DISCONTINUED | OUTPATIENT
Start: 2021-04-28 | End: 2021-05-03

## 2021-04-28 NOTE — CONSULTS
Critical Care Consult     Assessment / Plan:  1. Pleuritic chest pain - likely pleuritis. Has some mild pleural thickening vs effusion.  CTA chest with no PE  - US chest. Consider thoracentesis pending results  - discussed resuming steroids and she declined (OMU=[id number]) Left 9/2010, 2/11    breast abcess   • OTHER  1998    laproscopic for endometriosis       omeprazole 20 MG Oral Capsule Delayed Release, Take 20 mg by mouth every morning before breakfast., Disp: , Rfl: , 4/27/2021 at 0900  Vedolizumab (ENTYVIO MG Oral Capsule Delayed Release, Take 20 mg by mouth every morning before breakfast., Disp: , Rfl:   Vedolizumab (ENTYVIO IV), Inject into the vein., Disp: , Rfl:   zolpidem 5 MG Oral Tab, Take 1 tablet (5 mg total) by mouth nightly as needed for Sleep., D 0.00        Quit date: 2000        Years since quittin.8      Smokeless tobacco: Never Used    Vaping Use      Vaping Use: Never used    Alcohol use: Never    Drug use: No      Family History   Problem Relation Age of Onset   • Breast Cancer Pater EMR    Imaging:   Chest imaging reviewed

## 2021-04-28 NOTE — PLAN OF CARE
A&Ox4. On room air, lungs clear and diminished, R>L. No shortness of breath noted. States pain with deep inspiration. No cough noted. C/o pain to right thoracic area- Dilaudid PRN given with temporary relief. Abdomen soft and nontender, BS active.  Remains Cessation handout, if applicable  - Encourage broncho-pulmonary hygiene including cough, deep breathe, Incentive Spirometry  - Assess the need for suctioning and perform as needed  - Assess and instruct to report SOB or any respiratory difficulty  - Respir

## 2021-04-28 NOTE — PROGRESS NOTES
NURSING ADMISSION NOTE      Patient admitted via [address] to room 3600  Safety precautions initiated. Bed in low position. Call light in reach. Received patient from ED. Admission navigator completed. Admission orders received.

## 2021-04-28 NOTE — PLAN OF CARE
Assumed pt care at 0730. A&Ox4. VSS. Room air. NSR/ST on tele. R AC PIV SL. Voiding, up SBA to bathroom. Zofran PRN given for nausea, no emesis. PRN dilaudid & ice packs given for R pleuritic pain.  Per Jaylin Miller in radiology, plan for US thoracentesis at 1300 to Smoking Cessation handout, if applicable  - Encourage broncho-pulmonary hygiene including cough, deep breathe, Incentive Spirometry  - Assess the need for suctioning and perform as needed  - Assess and instruct to report SOB or any respiratory difficulty

## 2021-04-28 NOTE — H&P
.  CC: Patient presents with:  Back Pain       PCP: Ben Thompson MD    History of Present Illness: Patient is a 38year old female with PMH sig for factor V Leiden on coumadin, CVA, UC who presented with pleuritic chest pain.  She had an admission af for endometriosis        ALL:    Cephalexin              HIVES  Ibuprofen               HIVES  Augmentin [Amoxicil*    NAUSEA AND VOMITING  Azathioprine            OTHER (SEE COMMENTS)    Comment:Hepatotoxicity  Clindamycin             HIVES  Morphine History    Tobacco Use      Smoking status: Former Smoker        Packs/day: 0.00        Quit date: 2000        Years since quittin.8      Smokeless tobacco: Never Used    Alcohol use: Never       Fam Hx  Family History   Problem Relation Age of On BUN 12 9   CREATSERUM 0.76 0.61   GLU 80 78   CA 8.4* 8.5       Recent Labs   Lab 04/27/21  1735   ALT 10*   AST 9*   ALB 3.2*       Recent Labs   Lab 04/27/21  1735   TROP <0.045       Additional Diagnostics: personally reviewed EKG- nsr, no st/t abn CONCLUSION:  1. No evidence of pulmonary embolism. 2. Mild pleural effusions/mild pleural reaction along the right lung base as described above. 3. Exaggeration of the thoracic kyphosis with mild to moderate multilevel disc disease.    Dictated by (CST) or adenopathy. MEDIASTINUM:  No mass or adenopathy. PLEURA:  No mass or effusion. CHEST WALL:  No mass or axillary adenopathy. LIMITED ABDOMEN:  Limited images of the upper abdomen are unremarkable. BONES:  No bony lesion or fracture.    This is an ove Metoprolol IV (mg):  10 Region with Calcium Score: Left Main:  0     Left Anterior Descendin     Circumflex:   0     Right Coronary Artery:  0     TOTAL CALCIUM SCORE:   0 Agatston Units FINDINGS: A. CORONARY ANATOMY: 1.   LEFT MAIN CORONARY ARTERY:  T and lfts nl. CTA chest neg for PE  - appreciate pulm recs  - US chest ordered to better assess mild pleural effusion seen and if thoracentesis needed  - suspect pt may require repeat steroids for likely pleurisy  - norco, dilaudid prn for pain.  Can consid

## 2021-04-29 ENCOUNTER — APPOINTMENT (OUTPATIENT)
Dept: GENERAL RADIOLOGY | Facility: HOSPITAL | Age: 41
DRG: 187 | End: 2021-04-29
Attending: RADIOLOGY
Payer: COMMERCIAL

## 2021-04-29 ENCOUNTER — APPOINTMENT (OUTPATIENT)
Dept: ULTRASOUND IMAGING | Facility: HOSPITAL | Age: 41
DRG: 187 | End: 2021-04-29
Attending: INTERNAL MEDICINE
Payer: COMMERCIAL

## 2021-04-29 PROCEDURE — 87205 SMEAR GRAM STAIN: CPT | Performed by: INTERNAL MEDICINE

## 2021-04-29 PROCEDURE — 86140 C-REACTIVE PROTEIN: CPT | Performed by: HOSPITALIST

## 2021-04-29 PROCEDURE — 89050 BODY FLUID CELL COUNT: CPT | Performed by: INTERNAL MEDICINE

## 2021-04-29 PROCEDURE — 83615 LACTATE (LD) (LDH) ENZYME: CPT | Performed by: INTERNAL MEDICINE

## 2021-04-29 PROCEDURE — 85610 PROTHROMBIN TIME: CPT | Performed by: HOSPITALIST

## 2021-04-29 PROCEDURE — 86038 ANTINUCLEAR ANTIBODIES: CPT | Performed by: HOSPITALIST

## 2021-04-29 PROCEDURE — 84132 ASSAY OF SERUM POTASSIUM: CPT | Performed by: INTERNAL MEDICINE

## 2021-04-29 PROCEDURE — 86431 RHEUMATOID FACTOR QUANT: CPT | Performed by: HOSPITALIST

## 2021-04-29 PROCEDURE — 0W993ZZ DRAINAGE OF RIGHT PLEURAL CAVITY, PERCUTANEOUS APPROACH: ICD-10-PCS | Performed by: RADIOLOGY

## 2021-04-29 PROCEDURE — 87070 CULTURE OTHR SPECIMN AEROBIC: CPT | Performed by: INTERNAL MEDICINE

## 2021-04-29 PROCEDURE — 82945 GLUCOSE OTHER FLUID: CPT | Performed by: INTERNAL MEDICINE

## 2021-04-29 PROCEDURE — 84157 ASSAY OF PROTEIN OTHER: CPT | Performed by: INTERNAL MEDICINE

## 2021-04-29 PROCEDURE — 82150 ASSAY OF AMYLASE: CPT | Performed by: INTERNAL MEDICINE

## 2021-04-29 PROCEDURE — 32555 ASPIRATE PLEURA W/ IMAGING: CPT | Performed by: INTERNAL MEDICINE

## 2021-04-29 PROCEDURE — 71045 X-RAY EXAM CHEST 1 VIEW: CPT | Performed by: RADIOLOGY

## 2021-04-29 PROCEDURE — 89051 BODY FLUID CELL COUNT: CPT | Performed by: INTERNAL MEDICINE

## 2021-04-29 PROCEDURE — 88108 CYTOPATH CONCENTRATE TECH: CPT | Performed by: INTERNAL MEDICINE

## 2021-04-29 PROCEDURE — 88305 TISSUE EXAM BY PATHOLOGIST: CPT | Performed by: INTERNAL MEDICINE

## 2021-04-29 PROCEDURE — 85652 RBC SED RATE AUTOMATED: CPT | Performed by: HOSPITALIST

## 2021-04-29 PROCEDURE — 88312 SPECIAL STAINS GROUP 1: CPT | Performed by: INTERNAL MEDICINE

## 2021-04-29 PROCEDURE — 83615 LACTATE (LD) (LDH) ENZYME: CPT | Performed by: HOSPITALIST

## 2021-04-29 RX ORDER — ENOXAPARIN SODIUM 100 MG/ML
80 INJECTION SUBCUTANEOUS EVERY 12 HOURS SCHEDULED
Status: DISCONTINUED | OUTPATIENT
Start: 2021-04-29 | End: 2021-05-03

## 2021-04-29 RX ORDER — WARFARIN SODIUM 5 MG/1
10 TABLET ORAL
Status: COMPLETED | OUTPATIENT
Start: 2021-04-29 | End: 2021-04-29

## 2021-04-29 RX ORDER — PREDNISONE 20 MG/1
40 TABLET ORAL
Status: DISCONTINUED | OUTPATIENT
Start: 2021-04-29 | End: 2021-05-03

## 2021-04-29 RX ORDER — POTASSIUM CHLORIDE 20 MEQ/1
40 TABLET, EXTENDED RELEASE ORAL ONCE
Status: COMPLETED | OUTPATIENT
Start: 2021-04-29 | End: 2021-04-29

## 2021-04-29 NOTE — PAYOR COMM NOTE
Appropriate for inpatient status per guidelines for chest pain due to pleuritis with pleural effusion unresponsive to outpt steroids.        --------------  ADMISSION REVIEW     Payor: [address] [place] [address] #:  [id number]  Authorization Number:  Procedure: COLONOSCOPY;  Surgeon: Dawson Blanco MD;  Location: [facility] ENDOSCOPY   • DILATION/CURETTAGE,DIAGNOSTIC      2009   • EGD N/A 3/9/2021    Procedure: ESOPHAGOGASTRODUODENOSCOPY, COLONOSCOPY, POSSIBLE BIOPSY, POSSIBLE POLYPECTOMY [address] [facility], [id number];   Ellison 3.2 (*)     Globulin  4.9 (*)     A/G Ratio 0.7 (*)     All other components within normal limits   URINALYSIS WITH CULTURE REFLEX - Abnormal; Notable for the following components:    Blood Urine Large (*)     Leukocyte Esterase Urine Moderate (*)     WBC agrees with plan. Disposition and Plan     Clinical Impression:  Acute chest pain  (primary encounter diagnosis)  Pleural effusion  Hypercoagulable state ([facility] [address]     Disposition:  Admit  4/27/2021  9:15 pm              H&P - H&P Note      .   CC: Patient p 04/28/21  0638    139   K 3.7 3.7    107   CO2 25.0 27.0   BUN 12 9   CREATSERUM 0.76 0.61   GLU 80 78   CA 8.4* 8.5       Recent Labs   Lab 04/27/21  1735   ALT 10*   AST 9*   ALB 3.2*       Recent Labs   Lab 04/27/21  1735   TROP <0.045 pulmonary embolism. 2. Mild pleural effusions/mild pleural reaction along the right lung base as described above. 3. Exaggeration of the thoracic kyphosis with mild to moderate multilevel disc disease.    Dictated by (CST): Tim Herrera DO on 4/27/2021 at thoracentesis. Once US obtained today and if no plan for thora will need to resume coumadin tonight. Can have pharmacy to dose. 3) UC- cont balsalazide, entyvio    prophy- on coumadin          4/28 PULM    Assessment / Plan:  1.  Pleuritic chest pain - l

## 2021-04-29 NOTE — PLAN OF CARE
Assumed patient care @4485  Patient is AOx4  On room air,   NS/ST on tele  Voids-up with SBA to bathroom  Last BM 4/27  Patient c/o right sided pain, administered dilaudid per [facility]  Patient c/o nausea, no vomiting-administered zofran per [facility]  Regular die minimize respiratory effort  - Oxygen supplementation based on oxygen saturation or ABGs  - Provide Smoking Cessation handout, if applicable  - Encourage broncho-pulmonary hygiene including cough, deep breathe, Incentive Spirometry  - Assess the need for s

## 2021-04-29 NOTE — PROGRESS NOTES
[facility] Hospitalist note    PCP: Cliff Galarza MD    Chief Complaint:  Pleurisy    SUBJECTIVE:  Pt feeling some site-related pain after thoracentesis.  Otherwise still needing prn dilaudid for pleuritic pain at regular intervals    OBJECTIVE:  Temp:  [ 2,250 mg Oral BID       albuterol sulfate, Butalbital-APAP-Caffeine, HYDROcodone-acetaminophen, zolpidem, acetaminophen, HYDROmorphone HCl **OR** HYDROmorphone HCl **OR** HYDROmorphone HCl, ondansetron **OR** ondansetron HCl       Assessment/Plan:     1) R

## 2021-04-29 NOTE — PROGRESS NOTES
Pulmonary Progress Note        NAME: Chandana [facility] - ROOM: 3600/3600-A - MRN: [id number] - Age: 38year old - : 1980        Last 24hrs: No events overnight, had thora this AM, continues to note significant pain in the upper chest and lower back    O Pulmonary and Critical Care

## 2021-04-29 NOTE — CONSULTS
[facility] Dosing Service  Warfarin (Coumadin) Initial Dosing    Magi Yap is a 38year old patient for whom pharmacy has been consulted to dose warfarin (COUMADIN) for Prophylaxis of venous thrombosis( DVT/PE) by Dr. Dion Ramosr.   Based on this indication

## 2021-04-29 NOTE — PLAN OF CARE
Assumed care at 7:30am.  Patient received alert/oriented X 4 and resting in bed. Patient NPO since [id number] [facility], [address] [place] [address] Rd,[address] notified. Cardiac monitoring. RA. Electrolyte protocol, replaced.   Ok to resume diet after thoracentesis per hospitalist.  Vitals/site assessment p

## 2021-04-29 NOTE — PROCEDURES
[facility]  Procedure Note    Darius Banks Patient Status:  Inpatient    1980 MRN [id number]   [facility] 3NE-A Attending Zina Fuentes MD   Hosp Day # 2 PCP Nathaly Jacobson MD     Procedure: Right thoracentesis    Pre-Proced

## 2021-04-30 PROCEDURE — 84132 ASSAY OF SERUM POTASSIUM: CPT | Performed by: HOSPITALIST

## 2021-04-30 PROCEDURE — 85610 PROTHROMBIN TIME: CPT

## 2021-04-30 PROCEDURE — 85049 AUTOMATED PLATELET COUNT: CPT | Performed by: HOSPITALIST

## 2021-04-30 RX ORDER — DOCUSATE SODIUM 100 MG/1
100 CAPSULE, LIQUID FILLED ORAL 2 TIMES DAILY
Status: DISCONTINUED | OUTPATIENT
Start: 2021-04-30 | End: 2021-05-03

## 2021-04-30 RX ORDER — WARFARIN SODIUM 5 MG/1
10 TABLET ORAL
Status: COMPLETED | OUTPATIENT
Start: 2021-04-30 | End: 2021-04-30

## 2021-04-30 RX ORDER — POLYETHYLENE GLYCOL 3350 17 G/17G
17 POWDER, FOR SOLUTION ORAL DAILY
Status: DISCONTINUED | OUTPATIENT
Start: 2021-04-30 | End: 2021-05-03

## 2021-04-30 NOTE — PROGRESS NOTES
[facility] Dosing Service  Warfarin (Coumadin) Subsequent Dosing    Alfredo Kinney is a 38year old patient for whom pharmacy is dosing warfarin (Coumadin).  Goal INR is 2-3    Recent Labs   Lab 04/27/21  1735 04/28/21  1103 04/29/21  0549 04/30/21  2698

## 2021-04-30 NOTE — PROGRESS NOTES
[facility] Hospitalist note    PCP: Stephanie Zuniga MD    Chief Complaint:  Pleurisy    SUBJECTIVE:  Pain persistent about 8/10. Using norco, prn dilaudid   No bm in several days which is unusual for her. However she is passing flatus, no abd pain.     OBJE • enoxaparin  80 mg Subcutaneous 2 times per day   • predniSONE  40 mg Oral Daily with breakfast   • aspirin  81 mg Oral Daily   • Budesonide  9 mg Oral QAM   • Pantoprazole Sodium  40 mg Oral QAM AC   • Balsalazide Disodium  2,250 mg Oral BID       albu

## 2021-04-30 NOTE — PROGRESS NOTES
Pulmonary Progress Note        NAME: Alvarez Wade - ROOM: [facility]-A - MRN: [id number] - Age: 38year old - : 1980        Last 24hrs: No events overnight, ESR and CRP both elevated, pt states that her mother has sarcoid which the mother wanted us imaging  - pt underwent right sided thora w/ 50cc removed  -fluid consistent w/ exudative effusion  -no improvement in pain w/ drainage  -ESR and CRP both elevated, JJ in process  -cont trial of steroids  -could consider a pleural biopsy if symptoms persi

## 2021-04-30 NOTE — PLAN OF CARE
Assumed patient care @4350  Patient is AOx4  On room air,   NS on tele  Voids-up with SBA to bathroom  Last BM 4/27  Patient c/o right sided pain 8/10, administered dilaudid per [facility]  Patient denies nausea, no emesis  Regular diet   PIV Right AC-Saline l mentation and behavior  - Position to facilitate oxygenation and minimize respiratory effort  - Oxygen supplementation based on oxygen saturation or ABGs  - Provide Smoking Cessation handout, if applicable  - Encourage broncho-pulmonary hygiene including c

## 2021-04-30 NOTE — PLAN OF CARE
Assumed care at 7:30 am.  Patient alert/oriented X 4 and resting in bed. Call light w/in reach. Bed alarm on. Cardiac monitoring. RA. SCDs. Electrolyte protocol. PIV SL.  PRN pain meds administered per orders, see MAR.   Patient ambulated on unit w/staff-t

## 2021-05-01 PROCEDURE — 85610 PROTHROMBIN TIME: CPT

## 2021-05-01 RX ORDER — WARFARIN SODIUM 5 MG/1
10 TABLET ORAL
Status: COMPLETED | OUTPATIENT
Start: 2021-05-01 | End: 2021-05-01

## 2021-05-01 RX ORDER — KETOROLAC TROMETHAMINE 30 MG/ML
30 INJECTION, SOLUTION INTRAMUSCULAR; INTRAVENOUS ONCE
Status: COMPLETED | OUTPATIENT
Start: 2021-05-01 | End: 2021-05-01

## 2021-05-01 RX ORDER — ENOXAPARIN SODIUM 100 MG/ML
80 INJECTION SUBCUTANEOUS EVERY 12 HOURS SCHEDULED
Qty: 4.8 ML | Refills: 0 | Status: SHIPPED | OUTPATIENT
Start: 2021-05-01 | End: 2021-05-04

## 2021-05-01 RX ORDER — HYDROCODONE BITARTRATE AND ACETAMINOPHEN 10; 325 MG/1; MG/1
1 TABLET ORAL EVERY 4 HOURS PRN
Qty: 30 TABLET | Refills: 0 | Status: SHIPPED | OUTPATIENT
Start: 2021-05-01

## 2021-05-01 RX ORDER — HYDROCODONE BITARTRATE AND ACETAMINOPHEN 10; 325 MG/1; MG/1
1 TABLET ORAL EVERY 4 HOURS PRN
Status: DISCONTINUED | OUTPATIENT
Start: 2021-05-01 | End: 2021-05-02

## 2021-05-01 NOTE — CONSULTS
[address] [facility] Dosing Service  Warfarin (Coumadin) Subsequent Dosing    Amy Balderas is a 38year old patient for whom pharmacy is dosing warfarin (Coumadin).  Goal INR is 2-3    Recent Labs   Lab 04/27/21  1735 04/28/21  4703 04/29/21  0549 04/30/21  0538 0

## 2021-05-01 NOTE — DISCHARGE SUMMARY
General Medicine Discharge Summary     Patient ID:  Amy Balderas  38year old  7/30/1980    Admit date: 4/27/2021    Discharge date and time: 5/3/2021.      Attending Physician: Mary Murphy MD Procedures:        Patient instructions:      Current Discharge Medication List    START taking these medications    HYDROcodone-acetaminophen  MG Oral Tab  Take 1 tablet by mouth every 4 (four) hours as needed.     Enoxaparin Sodium 80 MG/0.8ML Subcu tolerated  Diet: regular diet  Wound Care: as directed  Code Status: Full Code      Exam on day of discharge:      05/01/21  0335   BP: 109/61   Pulse: 77   Resp: 18   Temp: 97.9 °F (36.6 °C)       General: no acute distress, alert and oriented x 3  Heart:

## 2021-05-01 NOTE — PROGRESS NOTES
Pulmonary Progress Note        NAME: Gricel Berger - ROOM: 4153/5712-P - MRN: [id number] - Age: 38year old - : 1980        Last 24hrs: No events overnight. Patient is very tearful and states that she wants to go home.  She wants to know why predniso 50cc removed  -fluid consistent w/ exudative effusion  -no improvement in pain w/ drainage  -ESR and CRP both elevated, JJ in process  -cont trial of steroids  -could consider a pleural biopsy if symptoms persist w/o conclusive dx     Irina NOLASCO

## 2021-05-01 NOTE — PLAN OF CARE
Alert x 4. On room air. SR on tele. Voids and continent. Up adlib. Still w/ sternal pain. Requiring pain meds. BP stable. Regular diet. No fluids. On Coumadin and Lovenox. Monitoring closely. Pt currently resting.     Problem: Patient/Family Goals need for suctioning and perform as needed  - Assess and instruct to report SOB or any respiratory difficulty  - Respiratory Therapy support as indicated  - Manage/alleviate anxiety  - Monitor for signs/symptoms of CO2 retention  Outcome: Progressing

## 2021-05-01 NOTE — PLAN OF CARE
Patient is a&ox4.  and RA. Tele and NSR. Up ab solis. Toradol IV x 1 with little relief. Norco increased to 10 mg per MAR. Patient reports no relief of chest discomfort. BM today. Awaiting Pulm to see patient. Staff will continue to monitor patient.      P Incentive Spirometry  - Assess the need for suctioning and perform as needed  - Assess and instruct to report SOB or any respiratory difficulty  - Respiratory Therapy support as indicated  - Manage/alleviate anxiety  - Monitor for signs/symptoms of CO2 ret

## 2021-05-02 PROCEDURE — 85610 PROTHROMBIN TIME: CPT

## 2021-05-02 RX ORDER — TRAMADOL HYDROCHLORIDE 50 MG/1
50 TABLET ORAL EVERY 6 HOURS PRN
Qty: 30 TABLET | Refills: 0 | Status: SHIPPED | OUTPATIENT
Start: 2021-05-02

## 2021-05-02 RX ORDER — TRAMADOL HYDROCHLORIDE 50 MG/1
50 TABLET ORAL EVERY 6 HOURS PRN
Status: DISCONTINUED | OUTPATIENT
Start: 2021-05-02 | End: 2021-05-03

## 2021-05-02 RX ORDER — HYDROCODONE BITARTRATE AND ACETAMINOPHEN 10; 325 MG/1; MG/1
2 TABLET ORAL EVERY 4 HOURS PRN
Status: DISCONTINUED | OUTPATIENT
Start: 2021-05-02 | End: 2021-05-03

## 2021-05-02 RX ORDER — WARFARIN SODIUM 5 MG/1
5 TABLET ORAL
Status: COMPLETED | OUTPATIENT
Start: 2021-05-02 | End: 2021-05-02

## 2021-05-02 NOTE — PROGRESS NOTES
[facility] Hospitalist Progress Note                                                                   [address] [facility]  7/30/1980    SUBJECTIVE:  Continued pain. Wants to go home.       OBJECTIVE:  T • Balsalazide Disodium  2,250 mg Oral BID     Continuous Infusions:   PRN: HYDROcodone-acetaminophen, traMADol HCl, albuterol sulfate, Butalbital-APAP-Caffeine, zolpidem, acetaminophen, ondansetron **OR** ondansetron HCl    Assessment/Plan:  Principal Pr

## 2021-05-02 NOTE — PLAN OF CARE
Assumed patient care @3277. Patient a&ox4. On RA, . NSR on tele. Afebrile, VSS. Electrolyte protocol. C/o severe pain, given prn norco with some relief. Lovenox subcutaneous. Coumadin restarted. Voids. Up ad solis.   Took a walk in [place] before be Cessation handout, if applicable  - Encourage broncho-pulmonary hygiene including cough, deep breathe, Incentive Spirometry  - Assess the need for suctioning and perform as needed  - Assess and instruct to report SOB or any respiratory difficulty  - Respir

## 2021-05-02 NOTE — PLAN OF CARE
Assumed patient care at 7:30   A/O x4   Room air   NSR on tele   Voids per bathroom   SCD are in place   Electrolyte protocol   Right AC SL C/D/I   Regular diet   All safety precautions are in place and will continue to monitor the patient     Problem: Chante Ibrahim

## 2021-05-02 NOTE — CONSULTS
[facility] Dosing Service  Warfarin (Coumadin) Subsequent Dosing    Anay Naqvi is a 38year old patient for whom pharmacy is dosing warfarin (Coumadin).  Goal INR is 2-3    Recent Labs   Lab 04/28/21  6659 04/29/21  0549 04/30/21  0538 05/01/21  0632 0

## 2021-05-02 NOTE — PROGRESS NOTES
Pulmonary Progress Note      NAME: Chandana Lat - ROOM: 7111/3415-X - MRN: [id number] - Age: 38year old - : 1980    Assessment/Plan:  1. Pleuritic chest pain - likely pleuritis. Had a tiny effusion +/- pleural thickening.  CTA chest with no PE  -fa --    MCV 82.8  --   --    MCH 25.1*  --   --    MCHC 30.3*  --   --    RDW 13.2  --   --    NEPRELIM 5.78  --   --    WBC 8.2  --   --    .0   < > 328.0    < > = values in this interval not displayed.      Recent Labs   Lab 04/27/21  1735 04/27/21

## 2021-05-03 VITALS
SYSTOLIC BLOOD PRESSURE: 109 MMHG | HEIGHT: 66 IN | WEIGHT: 170.81 LBS | HEART RATE: 73 BPM | DIASTOLIC BLOOD PRESSURE: 59 MMHG | TEMPERATURE: 98 F | BODY MASS INDEX: 27.45 KG/M2 | OXYGEN SATURATION: 99 % | RESPIRATION RATE: 18 BRPM

## 2021-05-03 PROCEDURE — 85610 PROTHROMBIN TIME: CPT

## 2021-05-03 RX ORDER — WARFARIN SODIUM 2 MG/1
2 TABLET ORAL
Status: DISCONTINUED | OUTPATIENT
Start: 2021-05-03 | End: 2021-05-03

## 2021-05-03 RX ORDER — PREDNISONE 20 MG/1
40 TABLET ORAL
Qty: 20 TABLET | Refills: 0 | Status: SHIPPED | OUTPATIENT
Start: 2021-05-04 | End: 2021-05-14

## 2021-05-03 NOTE — PROGRESS NOTES
[facility] Hospitalist Progress Note                                                                   [address] [facility]  7/30/1980    SUBJECTIVE:  Continued pain. Wants to go home.       OBJECTIVE:  T Daily   • Budesonide  9 mg Oral QAM   • Pantoprazole Sodium  40 mg Oral QAM AC   • Balsalazide Disodium  2,250 mg Oral BID     Continuous Infusions:   PRN: HYDROcodone-acetaminophen, traMADol HCl, albuterol sulfate, Butalbital-APAP-Caffeine, zolpidem, acet

## 2021-05-03 NOTE — PROGRESS NOTES
Patient was educated on new medications and what medications the patient should stop. IV and tele was taken off at time of discharge. Answered all questions at time of discharge. Patient had pain medication and her  is driving her home.  Answered all

## 2021-05-03 NOTE — PROGRESS NOTES
[facility] PULMONARY/CRITICAL CARE    S: Pt continues to have back pain. She becomes tearful at times. She has not improved very much.      Meds:  • Warfarin Sodium  2 mg Oral Once at night   • docusate sodium  100 mg Oral BID   • PEG 3350  17 g Oral Daily   • jose 05/01/21  0632 05/02/21  0556 05/03/21  0650   INR 1.69* 1.99* 2.43*        Recent Labs   Lab 04/27/21  1735   TROP <0.045       Recent Labs   Lab 04/27/21  1735 04/29/21  0549   LDH  --  162   DDIMER 1.25*  --    CRP  --  10.30*       Imaging reviewed

## 2021-05-03 NOTE — PLAN OF CARE
Assumed patient care at 7:30  A/O x4   Room air   NSR on tele   Voids per bathroom  Right sided pain noted per patient   Right AC SL   Regular diet   Electrolyte protocol   All safety precautions are in place and will continue to monitor the patient     Pr

## 2021-05-03 NOTE — PLAN OF CARE
A&Ox4. On room air, lungs clear and diminished, more diminished to RLL. States mild shortness of breath with exertion. States pain with deep inspiration. No cough noted. On PO Prednisone.  C/o pain to right thoracic area- PRN Tramadol and Norco given per MA oxygen saturation or ABGs  - Provide Smoking Cessation handout, if applicable  - Encourage broncho-pulmonary hygiene including cough, deep breathe, Incentive Spirometry  - Assess the need for suctioning and perform as needed  - Assess and instruct to repor

## 2021-05-03 NOTE — CONSULTS
[facility] Dosing Service  Warfarin (Coumadin) Subsequent Dosing    Kaz Canas is a 38year old patient for whom pharmacy is dosing warfarin (Coumadin).  Goal INR is 2-3    Recent Labs   Lab 04/29/21  0549 04/30/21  0538 05/01/21  0632 05/02/21  0556 0

## 2021-05-04 NOTE — PAYOR COMM NOTE
--------------  DISCHARGE REVIEW    Payor: [address] [place] [address] Drive #:  [id number]  Authorization Number: [id number]    Admit date: 4/27/21  Admit time:  10:51 PM  Discharge Date: 5/3/2021  4:16 PM     Admitting Physician: Isra Roque MD  Att R lower chest/R upper abd discomfort, pleuritic in nature. H/o cholecystectomy and lfts nl.   CTA chest neg for PE  - appreciate pulm recs,  - diagnostic thoracentesis done, was able to remove 50 ml- so far results appear nonspecific, inflammatory and exuda 10 mg by mouth See Admin Instructions. Sundays, Tuesdays, Wednesdays, Thursdays, Saturdays      ! ! Warfarin Sodium 5 MG Oral Tab  Take 5 mg by mouth See Admin Instructions.  Mondays and Fridays     Budesonide 3 MG Oral Cap DR Particles  Take 3 capsules (9 m

## 2021-06-30 ENCOUNTER — HOSPITAL ENCOUNTER (OUTPATIENT)
Dept: GENERAL RADIOLOGY | Age: 41
Discharge: HOME | End: 2021-06-30
Attending: INTERNAL MEDICINE
Payer: COMMERCIAL

## 2021-06-30 DIAGNOSIS — R09.1 PLEURISY: ICD-10-CM

## 2021-06-30 PROCEDURE — 71046 X-RAY EXAM CHEST 2 VIEWS: CPT | Performed by: INTERNAL MEDICINE

## 2021-06-30 NOTE — PROGRESS NOTES
Patient notified of results, verbalizes understanding. Pt asked what should she do about the pain? She has tried tylenol and this does not help, this is the only thing that she can take.  The sharp pain is on left side in the back, she feels this sharp p

## 2021-11-24 ENCOUNTER — HOSPITAL ENCOUNTER (OUTPATIENT)
Dept: MAMMOGRAPHY | Age: 41
Discharge: HOME | End: 2021-11-24
Attending: NURSE PRACTITIONER
Payer: COMMERCIAL

## 2021-11-24 DIAGNOSIS — Z01.419 ENCOUNTER FOR WELL WOMAN EXAM WITH ROUTINE GYNECOLOGICAL EXAM: ICD-10-CM

## 2021-11-24 PROCEDURE — 77067 SCR MAMMO BI INCL CAD: CPT | Performed by: NURSE PRACTITIONER

## 2021-11-24 PROCEDURE — 77063 BREAST TOMOSYNTHESIS BI: CPT | Performed by: NURSE PRACTITIONER

## 2021-12-17 ENCOUNTER — HOSPITAL ENCOUNTER (OUTPATIENT)
Dept: ULTRASOUND IMAGING | Age: 41
Discharge: HOME | End: 2021-12-17
Attending: NURSE PRACTITIONER
Payer: COMMERCIAL

## 2021-12-17 DIAGNOSIS — E04.1 THYROID NODULE: ICD-10-CM

## 2021-12-17 PROCEDURE — 76536 US EXAM OF HEAD AND NECK: CPT | Performed by: NURSE PRACTITIONER

## 2022-01-07 ENCOUNTER — LAB ENCOUNTER (OUTPATIENT)
Dept: LAB | Age: 42
End: 2022-01-07
Attending: NURSE PRACTITIONER
Payer: COMMERCIAL

## 2022-01-07 DIAGNOSIS — Z13.9 SCREENING FOR CONDITION: ICD-10-CM

## 2022-01-09 LAB — SARS-COV-2 RNA RESP QL NAA+PROBE: NOT DETECTED

## 2022-01-10 ENCOUNTER — HOSPITAL ENCOUNTER (OUTPATIENT)
Dept: ULTRASOUND IMAGING | Facility: HOSPITAL | Age: 42
Discharge: HOME | End: 2022-01-10
Attending: NURSE PRACTITIONER
Payer: COMMERCIAL

## 2022-01-10 DIAGNOSIS — E04.1 THYROID NODULE: ICD-10-CM

## 2022-01-10 PROCEDURE — 88173 CYTOPATH EVAL FNA REPORT: CPT | Performed by: NURSE PRACTITIONER

## 2022-01-10 PROCEDURE — 10005 FNA BX W/US GDN 1ST LES: CPT | Performed by: NURSE PRACTITIONER

## 2022-01-17 ENCOUNTER — LAB ENCOUNTER (OUTPATIENT)
Dept: LAB | Age: 42
End: 2022-01-17
Attending: INTERNAL MEDICINE
Payer: COMMERCIAL

## 2022-01-17 DIAGNOSIS — D68.59 HYPERCOAGULABLE STATE (HCC): ICD-10-CM

## 2022-01-17 LAB
INR BLD: 1.61 (ref 0.8–1.2)
PROTHROMBIN TIME: 18.9 SECONDS (ref 11.6–14.8)

## 2022-01-17 PROCEDURE — 36415 COLL VENOUS BLD VENIPUNCTURE: CPT

## 2022-01-17 PROCEDURE — 85610 PROTHROMBIN TIME: CPT

## 2022-01-19 ENCOUNTER — LAB ENCOUNTER (OUTPATIENT)
Dept: LAB | Age: 42
End: 2022-01-19
Attending: INTERNAL MEDICINE
Payer: COMMERCIAL

## 2022-01-19 DIAGNOSIS — Z79.01 LONG TERM (CURRENT) USE OF ANTICOAGULANTS: ICD-10-CM

## 2022-01-19 DIAGNOSIS — D68.59 HYPERCOAGULABLE STATE (HCC): ICD-10-CM

## 2022-01-19 DIAGNOSIS — Z86.73 HISTORY OF STROKE: ICD-10-CM

## 2022-01-19 LAB
INR BLD: 2.3 (ref 0.8–1.2)
PROTHROMBIN TIME: 25.1 SECONDS (ref 11.6–14.8)

## 2022-01-19 PROCEDURE — 85610 PROTHROMBIN TIME: CPT

## 2022-01-19 PROCEDURE — 36415 COLL VENOUS BLD VENIPUNCTURE: CPT

## 2022-02-23 PROBLEM — R07.9 ACUTE CHEST PAIN: Status: RESOLVED | Noted: 2021-04-27 | Resolved: 2022-02-23

## 2022-03-03 ENCOUNTER — LAB ENCOUNTER (OUTPATIENT)
Dept: LAB | Age: 42
End: 2022-03-03
Attending: NURSE PRACTITIONER
Payer: COMMERCIAL

## 2022-03-03 DIAGNOSIS — Z01.818 PREOP GENERAL PHYSICAL EXAM: ICD-10-CM

## 2022-03-03 DIAGNOSIS — K51.00 ULCERATIVE CHRONIC PANCOLITIS WITHOUT COMPLICATIONS (HCC): ICD-10-CM

## 2022-03-03 DIAGNOSIS — E04.1 THYROID NODULE: ICD-10-CM

## 2022-03-03 DIAGNOSIS — D68.59 HYPERCOAGULABLE STATE (HCC): ICD-10-CM

## 2022-03-03 DIAGNOSIS — Z86.73 HISTORY OF STROKE: ICD-10-CM

## 2022-03-03 DIAGNOSIS — Z79.01 LONG TERM (CURRENT) USE OF ANTICOAGULANTS: ICD-10-CM

## 2022-03-03 LAB
ALBUMIN SERPL-MCNC: 4 G/DL (ref 3.4–5)
ALBUMIN/GLOB SERPL: 1 {RATIO} (ref 1–2)
ALP LIVER SERPL-CCNC: 65 U/L
ALT SERPL-CCNC: 16 U/L
ANION GAP SERPL CALC-SCNC: 10 MMOL/L (ref 0–18)
BASOPHILS # BLD AUTO: 0.02 X10(3) UL (ref 0–0.2)
BASOPHILS NFR BLD AUTO: 0.3 %
BILIRUB SERPL-MCNC: 0.4 MG/DL (ref 0.1–2)
BUN BLD-MCNC: 9 MG/DL (ref 7–18)
CALCIUM BLD-MCNC: 9.1 MG/DL (ref 8.5–10.1)
CHLORIDE SERPL-SCNC: 105 MMOL/L (ref 98–112)
CO2 SERPL-SCNC: 20 MMOL/L (ref 21–32)
CREAT BLD-MCNC: 0.71 MG/DL
EOSINOPHIL NFR BLD AUTO: 4.7 %
ERYTHROCYTE [DISTWIDTH] IN BLOOD BY AUTOMATED COUNT: 14.2 %
FASTING STATUS PATIENT QL REPORTED: NO
GLOBULIN PLAS-MCNC: 4 G/DL (ref 2.8–4.4)
GLUCOSE BLD-MCNC: 80 MG/DL (ref 70–99)
HCT VFR BLD AUTO: 36.4 %
HGB BLD-MCNC: 11.3 G/DL
IMM GRANULOCYTES # BLD AUTO: 0.03 X10(3) UL (ref 0–1)
IMM GRANULOCYTES NFR BLD: 0.5 %
LYMPHOCYTES # BLD AUTO: 1.7 X10(3) UL (ref 1–4)
LYMPHOCYTES NFR BLD AUTO: 25.6 %
MCH RBC QN AUTO: 25.5 PG (ref 26–34)
MCHC RBC AUTO-ENTMCNC: 31 G/DL (ref 31–37)
MCV RBC AUTO: 82.2 FL
MONOCYTES # BLD AUTO: 0.53 X10(3) UL (ref 0.1–1)
MONOCYTES NFR BLD AUTO: 8 %
NEUTROPHILS # BLD AUTO: 4.04 X10 (3) UL (ref 1.5–7.7)
NEUTROPHILS # BLD AUTO: 4.04 X10(3) UL (ref 1.5–7.7)
NEUTROPHILS NFR BLD AUTO: 60.9 %
OSMOLALITY SERPL CALC.SUM OF ELEC: 278 MOSM/KG (ref 275–295)
PLATELET # BLD AUTO: 292 10(3)UL (ref 150–450)
POTASSIUM SERPL-SCNC: 4.2 MMOL/L (ref 3.5–5.1)
PROT SERPL-MCNC: 8 G/DL (ref 6.4–8.2)
RBC # BLD AUTO: 4.43 X10(6)UL
SODIUM SERPL-SCNC: 135 MMOL/L (ref 136–145)
WBC # BLD AUTO: 6.6 X10(3) UL (ref 4–11)

## 2022-03-03 PROCEDURE — 85025 COMPLETE CBC W/AUTO DIFF WBC: CPT

## 2022-03-03 PROCEDURE — 80053 COMPREHEN METABOLIC PANEL: CPT

## 2022-03-03 PROCEDURE — 36415 COLL VENOUS BLD VENIPUNCTURE: CPT

## 2022-03-15 ENCOUNTER — ANESTHESIA EVENT (OUTPATIENT)
Dept: SURGERY | Facility: HOSPITAL | Age: 42
End: 2022-03-15
Payer: COMMERCIAL

## 2022-03-15 ENCOUNTER — ANESTHESIA (OUTPATIENT)
Dept: SURGERY | Facility: HOSPITAL | Age: 42
End: 2022-03-15
Payer: COMMERCIAL

## 2022-03-15 ENCOUNTER — HOSPITAL ENCOUNTER (OUTPATIENT)
Facility: HOSPITAL | Age: 42
Setting detail: HOSPITAL OUTPATIENT SURGERY
Discharge: HOME | End: 2022-03-15
Attending: OTOLARYNGOLOGY | Admitting: OTOLARYNGOLOGY
Payer: COMMERCIAL

## 2022-03-15 VITALS
BODY MASS INDEX: 30.22 KG/M2 | TEMPERATURE: 99 F | HEART RATE: 95 BPM | RESPIRATION RATE: 16 BRPM | OXYGEN SATURATION: 97 % | DIASTOLIC BLOOD PRESSURE: 80 MMHG | HEIGHT: 66 IN | SYSTOLIC BLOOD PRESSURE: 117 MMHG | WEIGHT: 188.06 LBS

## 2022-03-15 DIAGNOSIS — E04.1 NONTOXIC UNINODULAR GOITER: ICD-10-CM

## 2022-03-15 LAB
B-HCG UR QL: NEGATIVE
INR BLD: 1.07 (ref 0.8–1.2)
PROTHROMBIN TIME: 13.9 SECONDS (ref 11.6–14.8)

## 2022-03-15 PROCEDURE — 88342 IMHCHEM/IMCYTCHM 1ST ANTB: CPT | Performed by: OTOLARYNGOLOGY

## 2022-03-15 PROCEDURE — 81025 URINE PREGNANCY TEST: CPT

## 2022-03-15 PROCEDURE — 0GTH0ZZ RESECTION OF RIGHT THYROID GLAND LOBE, OPEN APPROACH: ICD-10-PCS | Performed by: OTOLARYNGOLOGY

## 2022-03-15 PROCEDURE — 85610 PROTHROMBIN TIME: CPT

## 2022-03-15 PROCEDURE — 88307 TISSUE EXAM BY PATHOLOGIST: CPT | Performed by: OTOLARYNGOLOGY

## 2022-03-15 PROCEDURE — 88341 IMHCHEM/IMCYTCHM EA ADD ANTB: CPT | Performed by: OTOLARYNGOLOGY

## 2022-03-15 RX ORDER — ONDANSETRON 2 MG/ML
4 INJECTION INTRAMUSCULAR; INTRAVENOUS AS NEEDED
Status: DISCONTINUED | OUTPATIENT
Start: 2022-03-15 | End: 2022-03-15

## 2022-03-15 RX ORDER — SODIUM CHLORIDE, SODIUM LACTATE, POTASSIUM CHLORIDE, CALCIUM CHLORIDE 600; 310; 30; 20 MG/100ML; MG/100ML; MG/100ML; MG/100ML
INJECTION, SOLUTION INTRAVENOUS CONTINUOUS
Status: DISCONTINUED | OUTPATIENT
Start: 2022-03-15 | End: 2022-03-15

## 2022-03-15 RX ORDER — NALOXONE HYDROCHLORIDE 0.4 MG/ML
80 INJECTION, SOLUTION INTRAMUSCULAR; INTRAVENOUS; SUBCUTANEOUS AS NEEDED
Status: DISCONTINUED | OUTPATIENT
Start: 2022-03-15 | End: 2022-03-15

## 2022-03-15 RX ORDER — LIDOCAINE HYDROCHLORIDE 40 MG/ML
INJECTION, SOLUTION RETROBULBAR; TOPICAL AS NEEDED
Status: DISCONTINUED | OUTPATIENT
Start: 2022-03-15 | End: 2022-03-15 | Stop reason: SURG

## 2022-03-15 RX ORDER — HYDROCODONE BITARTRATE AND ACETAMINOPHEN 5; 325 MG/1; MG/1
1 TABLET ORAL AS NEEDED
Status: DISCONTINUED | OUTPATIENT
Start: 2022-03-15 | End: 2022-03-15

## 2022-03-15 RX ORDER — HYDROMORPHONE HYDROCHLORIDE 1 MG/ML
0.4 INJECTION, SOLUTION INTRAMUSCULAR; INTRAVENOUS; SUBCUTANEOUS EVERY 5 MIN PRN
Status: DISCONTINUED | OUTPATIENT
Start: 2022-03-15 | End: 2022-03-15

## 2022-03-15 RX ORDER — BALSALAZIDE DISODIUM 750 MG/1
2250 CAPSULE ORAL 2 TIMES DAILY
COMMUNITY

## 2022-03-15 RX ORDER — HYDROCODONE BITARTRATE AND ACETAMINOPHEN 5; 325 MG/1; MG/1
2 TABLET ORAL AS NEEDED
Status: COMPLETED | OUTPATIENT
Start: 2022-03-15 | End: 2022-03-15

## 2022-03-15 RX ORDER — LABETALOL HYDROCHLORIDE 5 MG/ML
5 INJECTION, SOLUTION INTRAVENOUS EVERY 5 MIN PRN
Status: DISCONTINUED | OUTPATIENT
Start: 2022-03-15 | End: 2022-03-15

## 2022-03-15 RX ORDER — HYDROCODONE BITARTRATE AND ACETAMINOPHEN 5; 325 MG/1; MG/1
1 TABLET ORAL AS NEEDED
Status: COMPLETED | OUTPATIENT
Start: 2022-03-15 | End: 2022-03-15

## 2022-03-15 RX ORDER — MIDAZOLAM HYDROCHLORIDE 1 MG/ML
INJECTION INTRAMUSCULAR; INTRAVENOUS AS NEEDED
Status: DISCONTINUED | OUTPATIENT
Start: 2022-03-15 | End: 2022-03-15 | Stop reason: SURG

## 2022-03-15 RX ORDER — ACETAMINOPHEN 500 MG
1000 TABLET ORAL ONCE
Status: DISCONTINUED | OUTPATIENT
Start: 2022-03-15 | End: 2022-03-15 | Stop reason: HOSPADM

## 2022-03-15 RX ORDER — METOCLOPRAMIDE HYDROCHLORIDE 5 MG/ML
10 INJECTION INTRAMUSCULAR; INTRAVENOUS AS NEEDED
Status: DISCONTINUED | OUTPATIENT
Start: 2022-03-15 | End: 2022-03-15

## 2022-03-15 RX ORDER — ACETAMINOPHEN 500 MG
1000 TABLET ORAL ONCE AS NEEDED
Status: DISCONTINUED | OUTPATIENT
Start: 2022-03-15 | End: 2022-03-15

## 2022-03-15 RX ORDER — HYDROMORPHONE HYDROCHLORIDE 1 MG/ML
INJECTION, SOLUTION INTRAMUSCULAR; INTRAVENOUS; SUBCUTANEOUS
Status: COMPLETED
Start: 2022-03-15 | End: 2022-03-15

## 2022-03-15 RX ORDER — DIPHENHYDRAMINE HYDROCHLORIDE 50 MG/ML
12.5 INJECTION INTRAMUSCULAR; INTRAVENOUS AS NEEDED
Status: DISCONTINUED | OUTPATIENT
Start: 2022-03-15 | End: 2022-03-15

## 2022-03-15 RX ORDER — DEXAMETHASONE SODIUM PHOSPHATE 4 MG/ML
VIAL (ML) INJECTION AS NEEDED
Status: DISCONTINUED | OUTPATIENT
Start: 2022-03-15 | End: 2022-03-15 | Stop reason: SURG

## 2022-03-15 RX ORDER — HYDROCODONE BITARTRATE AND ACETAMINOPHEN 5; 325 MG/1; MG/1
2 TABLET ORAL AS NEEDED
Status: DISCONTINUED | OUTPATIENT
Start: 2022-03-15 | End: 2022-03-15

## 2022-03-15 RX ORDER — ROCURONIUM BROMIDE 10 MG/ML
INJECTION, SOLUTION INTRAVENOUS AS NEEDED
Status: DISCONTINUED | OUTPATIENT
Start: 2022-03-15 | End: 2022-03-15 | Stop reason: SURG

## 2022-03-15 RX ORDER — MEPERIDINE HYDROCHLORIDE 25 MG/ML
12.5 INJECTION INTRAMUSCULAR; INTRAVENOUS; SUBCUTANEOUS AS NEEDED
Status: DISCONTINUED | OUTPATIENT
Start: 2022-03-15 | End: 2022-03-15

## 2022-03-15 RX ORDER — ONDANSETRON 2 MG/ML
INJECTION INTRAMUSCULAR; INTRAVENOUS AS NEEDED
Status: DISCONTINUED | OUTPATIENT
Start: 2022-03-15 | End: 2022-03-15 | Stop reason: SURG

## 2022-03-15 RX ORDER — LIDOCAINE HYDROCHLORIDE 10 MG/ML
INJECTION, SOLUTION EPIDURAL; INFILTRATION; INTRACAUDAL; PERINEURAL AS NEEDED
Status: DISCONTINUED | OUTPATIENT
Start: 2022-03-15 | End: 2022-03-15 | Stop reason: SURG

## 2022-03-15 RX ORDER — LIDOCAINE HYDROCHLORIDE AND EPINEPHRINE 10; 10 MG/ML; UG/ML
INJECTION, SOLUTION INFILTRATION; PERINEURAL AS NEEDED
Status: DISCONTINUED | OUTPATIENT
Start: 2022-03-15 | End: 2022-03-15 | Stop reason: HOSPADM

## 2022-03-15 RX ORDER — HYDROCODONE BITARTRATE AND ACETAMINOPHEN 5; 325 MG/1; MG/1
1 TABLET ORAL EVERY 6 HOURS PRN
Qty: 20 TABLET | Refills: 0 | Status: SHIPPED | OUTPATIENT
Start: 2022-03-15

## 2022-03-15 RX ADMIN — LIDOCAINE HYDROCHLORIDE 2 ML: 40 INJECTION, SOLUTION RETROBULBAR; TOPICAL at 10:52:00

## 2022-03-15 RX ADMIN — DEXAMETHASONE SODIUM PHOSPHATE 8 MG: 4 MG/ML VIAL (ML) INJECTION at 10:54:00

## 2022-03-15 RX ADMIN — SODIUM CHLORIDE, SODIUM LACTATE, POTASSIUM CHLORIDE, CALCIUM CHLORIDE: 600; 310; 30; 20 INJECTION, SOLUTION INTRAVENOUS at 10:48:00

## 2022-03-15 RX ADMIN — LIDOCAINE HYDROCHLORIDE 100 MG: 10 INJECTION, SOLUTION EPIDURAL; INFILTRATION; INTRACAUDAL; PERINEURAL at 10:52:00

## 2022-03-15 RX ADMIN — MIDAZOLAM HYDROCHLORIDE 2 MG: 1 INJECTION INTRAMUSCULAR; INTRAVENOUS at 10:49:00

## 2022-03-15 RX ADMIN — ROCURONIUM BROMIDE 10 MG: 10 INJECTION, SOLUTION INTRAVENOUS at 10:52:00

## 2022-03-15 RX ADMIN — ONDANSETRON 4 MG: 2 INJECTION INTRAMUSCULAR; INTRAVENOUS at 10:54:00

## 2022-03-15 RX ADMIN — SODIUM CHLORIDE, SODIUM LACTATE, POTASSIUM CHLORIDE, CALCIUM CHLORIDE: 600; 310; 30; 20 INJECTION, SOLUTION INTRAVENOUS at 11:13:00

## 2022-03-15 RX ADMIN — SODIUM CHLORIDE, SODIUM LACTATE, POTASSIUM CHLORIDE, CALCIUM CHLORIDE: 600; 310; 30; 20 INJECTION, SOLUTION INTRAVENOUS at 11:55:00

## 2022-03-15 NOTE — OPERATIVE REPORT
[address] [facility] Patient Status:  Outpatient in a Bed    1980 MRN [id number]   [place] [facility] Attending Cole Mcgovern MD   Hosp Day # 0 PCP Janeane Scheuermann, MD     Thyroid Op Note  Pre-Op Diagnosis:  Right Thyroid nodule  Post-Op Diagnosis:  Same  Procedure:  Right Hemithyroidectomy  Surgeon: Rory Leslie  Assistant:  [facility] Victor M  Anesthesia: General  Indications for Procedure:  Iraida Gusman is a very pleasant female with a history of a right nodule. FNA showed AUS and Afirma was suspicious. The above-named procedure was offered for definitive treatment. Procedure in Detail:  Patient taken to the operating room and laid supine on the operating table. A NIMS monitoring ET tube was used during the procedure for laryngeal nerve monitoring. After adequate IV and endotracheal anesthesia, the table was brought down slightly. A shoulder roll was placed. The patient was prepped and draped in standard fashion. The cricoid cartilage, sternal notch and thyroid notch were palpated and outlined with a surgical marker. Approximately 5cc of 1% lidocaine with epinephrine was injected. A curvilinear skin incision was made two fingerbreadths above the sternum. The subfascial and subcutaneous tissues were dissected with electrobovie cautery. The anterior layer of the deep cervical fascia was identified. Electrobovie cautery was used to incise at the midline. The median raphe was identified and the strap muscles were retracted laterally. The right thyroid lobe was visualized. Using blunt dissection, all remaining strap musculature was retracted laterally exposing only the thyroid. The superior portion of the lobe was retracted inferiorly and medially. With blunt dissection, the superior pole vasculature was visualized. These vessels were individually clipped for hemostasis. The middle thyroid vein was then bluntly dissected and clipped.   This allowed for further medial retraction of the thyroid lobe. Using the inferior thyroid artery for landmarks, the recurrent laryngeal nerve was identified. All thyroid tissue was elevated off the trachea while keeping visualization of the RLN. The isthmus was then clamped and divided. The remaining lobe was tied for hemostasis at the isthmus. The right lobe was sent for frozen pathology. After benign pathology was confirmed, the incision was closed. The midline straps were reapproximated with interupted vicryls and the platysma and sub Q closed with vicryls. The skin was closed with a running monocryl suture and mastisol and steristrips used for dressing. All instruments were removed and the patient was given back to anesthesia. The sponge, needle and instrument counts were correct at the end of the case. There were no complications. I performed all parts of this procedure. EBL:  10 cc  IVF:  100cc LR  Specimens:  Right thyroid lobe  UO: None  Condition:   To PACU stable  Coreen Tabares MD  3/15/2022  12:17 PM

## 2022-03-15 NOTE — ANESTHESIA POSTPROCEDURE EVALUATION
[address] [facility] Patient Status:  Outpatient in a Bed   Age/Gender 42year old female MRN [id number]   Location [facility] Attending Jd Ragsdale MD   Hosp Day # 0 PCP Xiomara Eduardo MD       Anesthesia Post-op Note    Right thyroid lobectomy    Procedure Summary     Date: 03/15/22 Room / Location: [address] [facility] / [facility] MAIN OR    Anesthesia Start: 3776 Anesthesia Stop: 6096    Procedure: Right thyroid lobectomy (Right Neck) Diagnosis:       Nontoxic uninodular goiter      (Nontoxic uninodular goiter [E04.1])    Surgeons: Jd Ragsdale MD Anesthesiologist: Daja Jeff MD    Anesthesia Type: general ASA Status: 2          Anesthesia Type: general    Vitals Value Taken Time   /74 03/15/22 1226   Temp 97.4 03/15/22 1229   Pulse 90 03/15/22 1227   Resp 16 03/15/22 1227   SpO2 100 % 03/15/22 1227   Vitals shown include unvalidated device data. Patient Location: PACU    Anesthesia Type: general    Airway Patency: patent and extubated    Postop Pain Control: adequate    Mental Status: mildly sedated but able to meaningfully participate in the post-anesthesia evaluation    Nausea/Vomiting: none    Cardiopulmonary/Hydration status: stable euvolemic    Complications: no apparent anesthesia related complications    Postop vital signs: stable    Dental Exam: Unchanged from Preop    Patient to be discharged from PACU when criteria met.

## 2022-03-15 NOTE — ANESTHESIA PROCEDURE NOTES
Airway  Date/Time: 3/15/2022 10:52 AM  Urgency: elective    Airway not difficult    General Information and Staff    Patient location during procedure: OR  Anesthesiologist: Margaret Blank MD  Performed: anesthesiologist     Indications and Patient Condition  Indications for airway management: anesthesia  Spontaneous Ventilation: absent  Sedation level: deep  Preoxygenated: yes  Patient position: sniffing  Mask difficulty assessment: 1 - vent by mask    Final Airway Details  Final airway type: endotracheal airway      Successful airway: ETT and NIM tube  Cuffed: yes   Successful intubation technique: Video laryngoscopy  Facilitating devices/methods: intubating stylet  Endotracheal tube insertion site: oral  Blade: GlideScope  Blade size: #3  ETT size (mm): 6.0    Cormack-Lehane Classification: grade I - full view of glottis  Placement verified by: chest auscultation and capnometry   Cuff volume (mL): 4  Measured from: lips  ETT to lips (cm): 22  Number of attempts at approach: 1  Number of other approaches attempted: 0

## 2022-03-15 NOTE — H&P
History and physical by Shruti CONSTANTINO on 2/23 reviewed and up to date. No changes to H&P.     Plan is right thyroid lobectomy with possible total thyroid

## 2023-04-25 ENCOUNTER — HOSPITAL ENCOUNTER (INPATIENT)
Facility: HOSPITAL | Age: 43
LOS: 2 days | Discharge: HOME | DRG: 315 | End: 2023-04-27
Attending: EMERGENCY MEDICINE | Admitting: HOSPITALIST
Payer: COMMERCIAL

## 2023-04-25 ENCOUNTER — APPOINTMENT (OUTPATIENT)
Dept: GENERAL RADIOLOGY | Facility: HOSPITAL | Age: 43
DRG: 315 | End: 2023-04-25
Attending: EMERGENCY MEDICINE
Payer: COMMERCIAL

## 2023-04-25 ENCOUNTER — APPOINTMENT (OUTPATIENT)
Dept: CV DIAGNOSTICS | Facility: HOSPITAL | Age: 43
DRG: 315 | End: 2023-04-25
Attending: INTERNAL MEDICINE
Payer: COMMERCIAL

## 2023-04-25 ENCOUNTER — APPOINTMENT (OUTPATIENT)
Dept: CT IMAGING | Facility: HOSPITAL | Age: 43
DRG: 315 | End: 2023-04-25
Attending: EMERGENCY MEDICINE
Payer: COMMERCIAL

## 2023-04-25 ENCOUNTER — HOSPITAL ENCOUNTER (OUTPATIENT)
Facility: HOSPITAL | Age: 43
Setting detail: OBSERVATION
Discharge: HOME | DRG: 315 | End: 2023-04-27
Attending: EMERGENCY MEDICINE | Admitting: HOSPITALIST
Payer: COMMERCIAL

## 2023-04-25 DIAGNOSIS — I31.39 PERICARDIAL EFFUSION: ICD-10-CM

## 2023-04-25 DIAGNOSIS — R07.9 ACUTE CHEST PAIN: Primary | ICD-10-CM

## 2023-04-25 LAB
ALBUMIN SERPL-MCNC: 2.9 G/DL (ref 3.4–5)
ALBUMIN/GLOB SERPL: 0.6 {RATIO} (ref 1–2)
ALP LIVER SERPL-CCNC: 71 U/L
ALT SERPL-CCNC: 24 U/L
ANION GAP SERPL CALC-SCNC: 5 MMOL/L (ref 0–18)
APTT PPP: 83.5 SECONDS (ref 23.3–35.6)
AST SERPL-CCNC: 28 U/L (ref 15–37)
ATRIAL RATE: 113 BPM
B-HCG UR QL: NEGATIVE
BASOPHILS # BLD AUTO: 0.02 X10(3) UL (ref 0–0.2)
BASOPHILS NFR BLD AUTO: 0.2 %
BILIRUB SERPL-MCNC: 0.6 MG/DL (ref 0.1–2)
BUN BLD-MCNC: 8 MG/DL (ref 7–18)
CALCIUM BLD-MCNC: 8.6 MG/DL (ref 8.5–10.1)
CHLORIDE SERPL-SCNC: 106 MMOL/L (ref 98–112)
CO2 SERPL-SCNC: 22 MMOL/L (ref 21–32)
CREAT BLD-MCNC: 0.7 MG/DL
CRP SERPL-MCNC: 20.8 MG/DL (ref ?–0.3)
EOSINOPHIL # BLD AUTO: 0.03 X10(3) UL (ref 0–0.7)
EOSINOPHIL NFR BLD AUTO: 0.3 %
ERYTHROCYTE [DISTWIDTH] IN BLOOD BY AUTOMATED COUNT: 12.1 %
ERYTHROCYTE [SEDIMENTATION RATE] IN BLOOD: 82 MM/HR
GFR SERPLBLD BASED ON 1.73 SQ M-ARVRAT: 110 ML/MIN/1.73M2 (ref 60–?)
GLOBULIN PLAS-MCNC: 4.7 G/DL (ref 2.8–4.4)
GLUCOSE BLD-MCNC: 99 MG/DL (ref 70–99)
HCT VFR BLD AUTO: 35.1 %
HGB BLD-MCNC: 11.3 G/DL
IMM GRANULOCYTES # BLD AUTO: 0.11 X10(3) UL (ref 0–1)
IMM GRANULOCYTES NFR BLD: 0.9 %
INR BLD: 3.97 (ref 0.85–1.16)
LYMPHOCYTES # BLD AUTO: 1.29 X10(3) UL (ref 1–4)
LYMPHOCYTES NFR BLD AUTO: 10.9 %
MCH RBC QN AUTO: 28 PG (ref 26–34)
MCHC RBC AUTO-ENTMCNC: 32.2 G/DL (ref 31–37)
MCV RBC AUTO: 87.1 FL
MONOCYTES # BLD AUTO: 1.4 X10(3) UL (ref 0.1–1)
MONOCYTES NFR BLD AUTO: 11.9 %
NEUTROPHILS # BLD AUTO: 8.96 X10 (3) UL (ref 1.5–7.7)
NEUTROPHILS # BLD AUTO: 8.96 X10(3) UL (ref 1.5–7.7)
NEUTROPHILS NFR BLD AUTO: 75.8 %
OSMOLALITY SERPL CALC.SUM OF ELEC: 274 MOSM/KG (ref 275–295)
P AXIS: 49 DEGREES
P-R INTERVAL: 120 MS
PLATELET # BLD AUTO: 286 10(3)UL (ref 150–450)
POTASSIUM SERPL-SCNC: 3.5 MMOL/L (ref 3.5–5.1)
PROT SERPL-MCNC: 7.6 G/DL (ref 6.4–8.2)
PROTHROMBIN TIME: 38.1 SECONDS (ref 11.6–14.8)
Q-T INTERVAL: 292 MS
QRS DURATION: 66 MS
QTC CALCULATION (BEZET): 400 MS
R AXIS: 82 DEGREES
RBC # BLD AUTO: 4.03 X10(6)UL
SODIUM SERPL-SCNC: 133 MMOL/L (ref 136–145)
T AXIS: 6 DEGREES
TROPONIN I HIGH SENSITIVITY: 5 NG/L
VENTRICULAR RATE: 113 BPM
WBC # BLD AUTO: 11.8 X10(3) UL (ref 4–11)

## 2023-04-25 PROCEDURE — 85025 COMPLETE CBC W/AUTO DIFF WBC: CPT | Performed by: EMERGENCY MEDICINE

## 2023-04-25 PROCEDURE — 86140 C-REACTIVE PROTEIN: CPT | Performed by: EMERGENCY MEDICINE

## 2023-04-25 PROCEDURE — 84484 ASSAY OF TROPONIN QUANT: CPT | Performed by: EMERGENCY MEDICINE

## 2023-04-25 PROCEDURE — 81025 URINE PREGNANCY TEST: CPT

## 2023-04-25 PROCEDURE — 71045 X-RAY EXAM CHEST 1 VIEW: CPT | Performed by: EMERGENCY MEDICINE

## 2023-04-25 PROCEDURE — 93306 TTE W/DOPPLER COMPLETE: CPT | Performed by: INTERNAL MEDICINE

## 2023-04-25 PROCEDURE — 85610 PROTHROMBIN TIME: CPT | Performed by: EMERGENCY MEDICINE

## 2023-04-25 PROCEDURE — 85652 RBC SED RATE AUTOMATED: CPT | Performed by: EMERGENCY MEDICINE

## 2023-04-25 PROCEDURE — 85730 THROMBOPLASTIN TIME PARTIAL: CPT | Performed by: EMERGENCY MEDICINE

## 2023-04-25 PROCEDURE — 71275 CT ANGIOGRAPHY CHEST: CPT | Performed by: EMERGENCY MEDICINE

## 2023-04-25 PROCEDURE — 96376 TX/PRO/DX INJ SAME DRUG ADON: CPT

## 2023-04-25 PROCEDURE — 80053 COMPREHEN METABOLIC PANEL: CPT | Performed by: EMERGENCY MEDICINE

## 2023-04-25 PROCEDURE — 99285 EMERGENCY DEPT VISIT HI MDM: CPT

## 2023-04-25 PROCEDURE — 93005 ELECTROCARDIOGRAM TRACING: CPT

## 2023-04-25 PROCEDURE — 96375 TX/PRO/DX INJ NEW DRUG ADDON: CPT

## 2023-04-25 PROCEDURE — 93010 ELECTROCARDIOGRAM REPORT: CPT

## 2023-04-25 PROCEDURE — 96374 THER/PROPH/DIAG INJ IV PUSH: CPT

## 2023-04-25 RX ORDER — HYDROMORPHONE HYDROCHLORIDE 1 MG/ML
0.5 INJECTION, SOLUTION INTRAMUSCULAR; INTRAVENOUS; SUBCUTANEOUS ONCE
Status: COMPLETED | OUTPATIENT
Start: 2023-04-25 | End: 2023-04-25

## 2023-04-25 RX ORDER — ONDANSETRON 2 MG/ML
4 INJECTION INTRAMUSCULAR; INTRAVENOUS ONCE
Status: COMPLETED | OUTPATIENT
Start: 2023-04-25 | End: 2023-04-25

## 2023-04-25 RX ORDER — BISACODYL 10 MG
10 SUPPOSITORY, RECTAL RECTAL
Status: DISCONTINUED | OUTPATIENT
Start: 2023-04-25 | End: 2023-04-27

## 2023-04-25 RX ORDER — HYDROMORPHONE HYDROCHLORIDE 1 MG/ML
0.8 INJECTION, SOLUTION INTRAMUSCULAR; INTRAVENOUS; SUBCUTANEOUS EVERY 2 HOUR PRN
Status: DISCONTINUED | OUTPATIENT
Start: 2023-04-25 | End: 2023-04-27

## 2023-04-25 RX ORDER — DEXTROSE AND SODIUM CHLORIDE 5; .45 G/100ML; G/100ML
INJECTION, SOLUTION INTRAVENOUS CONTINUOUS
Status: ACTIVE | OUTPATIENT
Start: 2023-04-25 | End: 2023-04-25

## 2023-04-25 RX ORDER — METHYLPREDNISOLONE SODIUM SUCCINATE 40 MG/ML
40 INJECTION, POWDER, LYOPHILIZED, FOR SOLUTION INTRAMUSCULAR; INTRAVENOUS ONCE
Status: COMPLETED | OUTPATIENT
Start: 2023-04-25 | End: 2023-04-25

## 2023-04-25 RX ORDER — SENNOSIDES 8.6 MG
17.2 TABLET ORAL NIGHTLY PRN
Status: DISCONTINUED | OUTPATIENT
Start: 2023-04-25 | End: 2023-04-27

## 2023-04-25 RX ORDER — COLCHICINE 0.6 MG/1
0.6 TABLET ORAL DAILY
Status: DISCONTINUED | OUTPATIENT
Start: 2023-04-25 | End: 2023-04-27

## 2023-04-25 RX ORDER — SODIUM CHLORIDE 9 MG/ML
INJECTION, SOLUTION INTRAVENOUS CONTINUOUS
Status: DISCONTINUED | OUTPATIENT
Start: 2023-04-25 | End: 2023-04-26

## 2023-04-25 RX ORDER — ENEMA 19; 7 G/133ML; G/133ML
1 ENEMA RECTAL ONCE AS NEEDED
Status: DISCONTINUED | OUTPATIENT
Start: 2023-04-25 | End: 2023-04-27

## 2023-04-25 RX ORDER — POTASSIUM CHLORIDE 20 MEQ/1
40 TABLET, EXTENDED RELEASE ORAL EVERY 4 HOURS
Status: COMPLETED | OUTPATIENT
Start: 2023-04-25 | End: 2023-04-25

## 2023-04-25 RX ORDER — ACETAMINOPHEN 500 MG
500 TABLET ORAL EVERY 4 HOURS PRN
Status: DISCONTINUED | OUTPATIENT
Start: 2023-04-25 | End: 2023-04-27

## 2023-04-25 RX ORDER — ONDANSETRON 2 MG/ML
4 INJECTION INTRAMUSCULAR; INTRAVENOUS EVERY 6 HOURS PRN
Status: DISCONTINUED | OUTPATIENT
Start: 2023-04-25 | End: 2023-04-27

## 2023-04-25 RX ORDER — WARFARIN SODIUM 5 MG/1
7.5 TABLET ORAL AS DIRECTED
COMMUNITY
End: 2023-04-27

## 2023-04-25 RX ORDER — PROCHLORPERAZINE EDISYLATE 5 MG/ML
5 INJECTION INTRAMUSCULAR; INTRAVENOUS EVERY 8 HOURS PRN
Status: DISCONTINUED | OUTPATIENT
Start: 2023-04-25 | End: 2023-04-27

## 2023-04-25 RX ORDER — HYDROMORPHONE HYDROCHLORIDE 1 MG/ML
0.4 INJECTION, SOLUTION INTRAMUSCULAR; INTRAVENOUS; SUBCUTANEOUS EVERY 2 HOUR PRN
Status: DISCONTINUED | OUTPATIENT
Start: 2023-04-25 | End: 2023-04-27

## 2023-04-25 RX ORDER — PREDNISONE 10 MG/1
40 TABLET ORAL
Status: DISCONTINUED | OUTPATIENT
Start: 2023-04-26 | End: 2023-04-27

## 2023-04-25 RX ORDER — HYDROMORPHONE HYDROCHLORIDE 1 MG/ML
0.5 INJECTION, SOLUTION INTRAMUSCULAR; INTRAVENOUS; SUBCUTANEOUS EVERY 30 MIN PRN
Status: DISCONTINUED | OUTPATIENT
Start: 2023-04-25 | End: 2023-04-25

## 2023-04-25 RX ORDER — HYDROMORPHONE HYDROCHLORIDE 1 MG/ML
0.2 INJECTION, SOLUTION INTRAMUSCULAR; INTRAVENOUS; SUBCUTANEOUS EVERY 2 HOUR PRN
Status: DISCONTINUED | OUTPATIENT
Start: 2023-04-25 | End: 2023-04-27

## 2023-04-25 RX ORDER — WARFARIN SODIUM 5 MG/1
10 TABLET ORAL AS DIRECTED
COMMUNITY
End: 2023-04-27

## 2023-04-25 RX ORDER — POLYETHYLENE GLYCOL 3350 17 G/17G
17 POWDER, FOR SOLUTION ORAL DAILY PRN
Status: DISCONTINUED | OUTPATIENT
Start: 2023-04-25 | End: 2023-04-27

## 2023-04-25 NOTE — ED QUICK NOTES
Orders for admission, patient is aware of plan and ready to go upstairs. Any questions, please call ED RN Carleton Merlin at extension [phone]. Patient Covid vaccination status: Fully vaccinated     COVID Test Ordered in ED: None    COVID Suspicion at Admission: N/A    Running Infusions:  None    Mental Status/LOC at time of transport: A/ox4    Other pertinent information: having echo at bedside.   CIWA score: N/A   NIH score:  N/A

## 2023-04-25 NOTE — ED INITIAL ASSESSMENT (HPI)
Pt reports chest pain wirse when breathing similar to hx of pleurisy, taking home prednisone with no releif, worsening pain in last few days

## 2023-04-25 NOTE — PROGRESS NOTES
04/25/23 1703 04/25/23 1705 04/25/23 1707   Vital Signs   /62 103/68 101/71   MAP (mmHg) 73 76 78   BP Location Left arm Left arm Left arm   BP Method Automatic Automatic Automatic   Patient Position Lying Sitting Standing

## 2023-04-26 LAB
ANION GAP SERPL CALC-SCNC: 4 MMOL/L (ref 0–18)
BUN BLD-MCNC: 7 MG/DL (ref 7–18)
CALCIUM BLD-MCNC: 8.4 MG/DL (ref 8.5–10.1)
CHLORIDE SERPL-SCNC: 109 MMOL/L (ref 98–112)
CO2 SERPL-SCNC: 22 MMOL/L (ref 21–32)
CREAT BLD-MCNC: 0.6 MG/DL
ERYTHROCYTE [DISTWIDTH] IN BLOOD BY AUTOMATED COUNT: 11.9 %
GFR SERPLBLD BASED ON 1.73 SQ M-ARVRAT: 114 ML/MIN/1.73M2 (ref 60–?)
GLUCOSE BLD-MCNC: 115 MG/DL (ref 70–99)
HCT VFR BLD AUTO: 32.4 %
HGB BLD-MCNC: 10.3 G/DL
INR BLD: 4.32 (ref 0.85–1.16)
MCH RBC QN AUTO: 28 PG (ref 26–34)
MCHC RBC AUTO-ENTMCNC: 31.8 G/DL (ref 31–37)
MCV RBC AUTO: 88 FL
OSMOLALITY SERPL CALC.SUM OF ELEC: 279 MOSM/KG (ref 275–295)
PLATELET # BLD AUTO: 268 10(3)UL (ref 150–450)
POTASSIUM SERPL-SCNC: 4.6 MMOL/L (ref 3.5–5.1)
PROTHROMBIN TIME: 40.7 SECONDS (ref 11.6–14.8)
RBC # BLD AUTO: 3.68 X10(6)UL
SODIUM SERPL-SCNC: 135 MMOL/L (ref 136–145)
WBC # BLD AUTO: 10.7 X10(3) UL (ref 4–11)

## 2023-04-26 PROCEDURE — 80048 BASIC METABOLIC PNL TOTAL CA: CPT | Performed by: HOSPITALIST

## 2023-04-26 PROCEDURE — 84132 ASSAY OF SERUM POTASSIUM: CPT | Performed by: HOSPITALIST

## 2023-04-26 PROCEDURE — 85027 COMPLETE CBC AUTOMATED: CPT | Performed by: HOSPITALIST

## 2023-04-26 PROCEDURE — 85610 PROTHROMBIN TIME: CPT | Performed by: HOSPITALIST

## 2023-04-26 RX ORDER — HYDROCODONE BITARTRATE AND ACETAMINOPHEN 5; 325 MG/1; MG/1
1 TABLET ORAL EVERY 4 HOURS PRN
Status: DISCONTINUED | OUTPATIENT
Start: 2023-04-26 | End: 2023-04-27

## 2023-04-26 RX ORDER — HYDROCODONE BITARTRATE AND ACETAMINOPHEN 5; 325 MG/1; MG/1
2 TABLET ORAL EVERY 4 HOURS PRN
Status: DISCONTINUED | OUTPATIENT
Start: 2023-04-26 | End: 2023-04-27

## 2023-04-26 NOTE — PLAN OF CARE
Received patient at 1. Patient A/O x 4. NSR/ST on tele. O2 saturation 98% on RA. Breath sounds clear. No C/O chest pain or SOB. Patient voiding with no issue. Bed is locked and in low position. Call light and personal items within reach. Pt complains of pain wth movement and when taking deep breathes/coughing. Prn pain meds given. VSS. SBP maintained above 100. HR maintained .

## 2023-04-26 NOTE — PLAN OF CARE
Received patient from ED @ 1700. Patient A&O x4, patient has some expressive aphasia from previous stroke. Has pain across chest, and across back with breathing described as aching, but sharp with repositioning, IV dilaudid given @ 1812, expresses some relief. Patient Normal sinus rhythm/sinus tach, heart sound clear, sBP in the 100s, no JVD present. On room air, no shortness of breath or dizziness. Patient continent of bladder and bowel, last bm prior to admission. Per cardiologist orders Colchicine, and IV solu medrol given by this RN on admission to floor. 1930: RN passed along instructions on monitoring to oncoming RN per this RN's conversation with cardiologist. Oncoming RN verbalized understanding.

## 2023-04-26 NOTE — PLAN OF CARE
Received patient at 0730. Alert and Oriented x4. Tele Rhythm NSR, ST with activity (100-115). O2 saturation 96% On room air. Breath sounds clear, diminished in bases. Some shortness of breath noted with exertion. Bed is locked and in low position. Call light and personal items within reach. Pt voiding with no issue. Pt tolerating ambulation well with SBA. Dizzy at times at baseline. Skin dry and intact. Pt on IVF, colchicine and prednisone. INR 4.32 today, coumadin on hold. Reviewed plan of care and patient verbalizes understanding. Problem: Patient/Family Goals  Goal: Patient/Family Long Term Goal  Description: Patient's Long Term Goal: to go home    Interventions:  - labs, IVF, increase activity, tele monitor, watch VS    - See additional Care Plan goals for specific interventions  Outcome: Progressing  Goal: Patient/Family Short Term Goal  Description: Patient's Short Term Goal: feel better    Interventions:   - - labs, IVF, increase activity, tele monitor, watch VS    - See additional Care Plan goals for specific interventions  Outcome: Progressing     Problem: CARDIOVASCULAR - ADULT  Goal: Maintains optimal cardiac output and hemodynamic stability  Description: INTERVENTIONS:  - Monitor vital signs, rhythm, and trends  - Monitor for bleeding, hypotension and signs of decreased cardiac output  - Evaluate effectiveness of vasoactive medications to optimize hemodynamic stability  - Monitor arterial and/or venous puncture sites for bleeding and/or hematoma  - Assess quality of pulses, skin color and temperature  - Assess for signs of decreased coronary artery perfusion - ex.  Angina  - Evaluate fluid balance, assess for edema, trend weights  Outcome: Progressing  Goal: Absence of cardiac arrhythmias or at baseline  Description: INTERVENTIONS:  - Continuous cardiac monitoring, monitor vital signs, obtain 12 lead EKG if indicated  - Evaluate effectiveness of antiarrhythmic and heart rate control medications as ordered  - Initiate emergency measures for life threatening arrhythmias  - Monitor electrolytes and administer replacement therapy as ordered  Outcome: Progressing Odomzo Counseling- I discussed with the patient the risks of Odomzo including but not limited to nausea, vomiting, diarrhea, constipation, weight loss, changes in the sense of taste, decreased appetite, muscle spasms, and hair loss.  The patient verbalized understanding of the proper use and possible adverse effects of Odomzo.  All of the patient's questions and concerns were addressed.

## 2023-04-27 ENCOUNTER — APPOINTMENT (OUTPATIENT)
Dept: CV DIAGNOSTICS | Facility: HOSPITAL | Age: 43
DRG: 315 | End: 2023-04-27
Attending: INTERNAL MEDICINE
Payer: COMMERCIAL

## 2023-04-27 VITALS
SYSTOLIC BLOOD PRESSURE: 98 MMHG | OXYGEN SATURATION: 98 % | RESPIRATION RATE: 12 BRPM | DIASTOLIC BLOOD PRESSURE: 51 MMHG | TEMPERATURE: 98 F | BODY MASS INDEX: 26.33 KG/M2 | WEIGHT: 163.81 LBS | HEIGHT: 66 IN | HEART RATE: 82 BPM

## 2023-04-27 LAB
ERYTHROCYTE [DISTWIDTH] IN BLOOD BY AUTOMATED COUNT: 12.1 %
HCT VFR BLD AUTO: 31 %
HGB BLD-MCNC: 9.9 G/DL
INR BLD: 3.96 (ref 0.85–1.16)
MCH RBC QN AUTO: 28.4 PG (ref 26–34)
MCHC RBC AUTO-ENTMCNC: 31.9 G/DL (ref 31–37)
MCV RBC AUTO: 88.8 FL
PLATELET # BLD AUTO: 319 10(3)UL (ref 150–450)
PROTHROMBIN TIME: 38.1 SECONDS (ref 11.6–14.8)
RBC # BLD AUTO: 3.49 X10(6)UL
WBC # BLD AUTO: 12.1 X10(3) UL (ref 4–11)

## 2023-04-27 PROCEDURE — 85610 PROTHROMBIN TIME: CPT | Performed by: HOSPITALIST

## 2023-04-27 PROCEDURE — 93308 TTE F-UP OR LMTD: CPT | Performed by: INTERNAL MEDICINE

## 2023-04-27 PROCEDURE — 85027 COMPLETE CBC AUTOMATED: CPT | Performed by: HOSPITALIST

## 2023-04-27 RX ORDER — COLCHICINE 0.6 MG/1
0.6 TABLET ORAL DAILY
Qty: 180 TABLET | Refills: 0 | Status: SHIPPED | OUTPATIENT
Start: 2023-04-28 | End: 2023-10-25

## 2023-04-27 RX ORDER — PREDNISONE 20 MG/1
TABLET ORAL
Qty: 30 TABLET | Refills: 0 | Status: SHIPPED | OUTPATIENT
Start: 2023-05-02 | End: 2023-05-28

## 2023-04-27 RX ORDER — HYDROCODONE BITARTRATE AND ACETAMINOPHEN 5; 325 MG/1; MG/1
1 TABLET ORAL EVERY 4 HOURS PRN
Qty: 20 TABLET | Refills: 0 | Status: SHIPPED | OUTPATIENT
Start: 2023-04-27

## 2023-04-27 NOTE — PLAN OF CARE
Pt is Ok to discharge per primary and consults. Pt still with pain but is improving, pain relief with Norco, scrip for norco, colchicine and prednisone filled per [address] and brought to patient room. Pt has appt for follow up with Dr Arti Fierro.  Discharge instructions including medications and follow ups given and patient and  verbalize understanding. IV removed, tele monitor discontinued. All belongings taken with pt. Pt transported off unit via wheelchair. [FreeTextEntry1] : flu shot given

## 2023-04-27 NOTE — PLAN OF CARE
Received patient at 0730. Alert and Oriented x4. Tele Rhythm NSR. O2 saturation 96% On room air, Breath sounds clear, diminished in bases. Bed is locked and in low position. Call light and personal items within reach. Chest pain pressure is slowly improving, still tight to mid chest area, is no longer radiating to right side or back. On Norco for pain. Took dilaudid during night as Norco makes her \"jittery\" and it is hard to sleep. Pt voiding with no issue. Pt tolerating ambulation well. Skin dry and intact. Echo this am. INR 3.96, coumadin is on hold. Reviewed plan of care and patient verbalizes understanding. Problem: Patient/Family Goals  Goal: Patient/Family Long Term Goal  Description: Patient's Long Term Goal: to go home    Interventions:  - labs, IVF, increase activity, tele monitor, watch VS    - See additional Care Plan goals for specific interventions  Outcome: Progressing  Goal: Patient/Family Short Term Goal  Description: Patient's Short Term Goal: feel better    Interventions:   - - labs, IVF, increase activity, tele monitor, watch VS    - See additional Care Plan goals for specific interventions  Outcome: Progressing     Problem: CARDIOVASCULAR - ADULT  Goal: Maintains optimal cardiac output and hemodynamic stability  Description: INTERVENTIONS:  - Monitor vital signs, rhythm, and trends  - Monitor for bleeding, hypotension and signs of decreased cardiac output  - Evaluate effectiveness of vasoactive medications to optimize hemodynamic stability  - Monitor arterial and/or venous puncture sites for bleeding and/or hematoma  - Assess quality of pulses, skin color and temperature  - Assess for signs of decreased coronary artery perfusion - ex.  Angina  - Evaluate fluid balance, assess for edema, trend weights  Outcome: Progressing  Goal: Absence of cardiac arrhythmias or at baseline  Description: INTERVENTIONS:  - Continuous cardiac monitoring, monitor vital signs, obtain 12 lead EKG if indicated  - Evaluate effectiveness of antiarrhythmic and heart rate control medications as ordered  - Initiate emergency measures for life threatening arrhythmias  - Monitor electrolytes and administer replacement therapy as ordered  Outcome: Progressing     Problem: PAIN - ADULT  Goal: Verbalizes/displays adequate comfort level or patient's stated pain goal  Description: INTERVENTIONS:  - Encourage pt to monitor pain and request assistance  - Assess pain using appropriate pain scale  - Administer analgesics based on type and severity of pain and evaluate response  - Implement non-pharmacological measures as appropriate and evaluate response  - Consider cultural and social influences on pain and pain management  - Manage/alleviate anxiety  - Utilize distraction and/or relaxation techniques  - Monitor for opioid side effects  - Notify MD/LIP if interventions unsuccessful or patient reports new pain  - Anticipate increased pain with activity and pre-medicate as appropriate  Outcome: Progressing     Problem: SAFETY ADULT - FALL  Goal: Free from fall injury  Description: INTERVENTIONS:  - Assess pt frequently for physical needs  - Identify cognitive and physical deficits and behaviors that affect risk of falls.   - [place] fall precautions as indicated by assessment.  - Educate pt/family on patient safety including physical limitations  - Instruct pt to call for assistance with activity based on assessment  - Modify environment to reduce risk of injury  - Provide assistive devices as appropriate  - Consider OT/PT consult to assist with strengthening/mobility  - Encourage toileting schedule  Outcome: Progressing

## 2023-04-27 NOTE — PLAN OF CARE
Patient alert and oriented x4. On RA. W/ pain w/ deep breaths. Dilaudid for pain. Per pt., Norco makes her restless. SR on tele. Continent of B&B. Up w/ SBA. SBP on high 90's and low 100's. POC explained. Verbalized understanding. Problem: Patient/Family Goals  Goal: Patient/Family Long Term Goal  Description: Patient's Long Term Goal: to go home    Interventions:  - labs, IVF, increase activity, tele monitor, watch VS    - See additional Care Plan goals for specific interventions  Outcome: Progressing  Goal: Patient/Family Short Term Goal  Description: Patient's Short Term Goal: feel better    Interventions:   - - labs, IVF, increase activity, tele monitor, watch VS    - See additional Care Plan goals for specific interventions  Outcome: Progressing     Problem: CARDIOVASCULAR - ADULT  Goal: Maintains optimal cardiac output and hemodynamic stability  Description: INTERVENTIONS:  - Monitor vital signs, rhythm, and trends  - Monitor for bleeding, hypotension and signs of decreased cardiac output  - Evaluate effectiveness of vasoactive medications to optimize hemodynamic stability  - Monitor arterial and/or venous puncture sites for bleeding and/or hematoma  - Assess quality of pulses, skin color and temperature  - Assess for signs of decreased coronary artery perfusion - ex.  Angina  - Evaluate fluid balance, assess for edema, trend weights  Outcome: Progressing  Goal: Absence of cardiac arrhythmias or at baseline  Description: INTERVENTIONS:  - Continuous cardiac monitoring, monitor vital signs, obtain 12 lead EKG if indicated  - Evaluate effectiveness of antiarrhythmic and heart rate control medications as ordered  - Initiate emergency measures for life threatening arrhythmias  - Monitor electrolytes and administer replacement therapy as ordered  Outcome: Progressing     Problem: PAIN - ADULT  Goal: Verbalizes/displays adequate comfort level or patient's stated pain goal  Description: INTERVENTIONS:  - Encourage pt to monitor pain and request assistance  - Assess pain using appropriate pain scale  - Administer analgesics based on type and severity of pain and evaluate response  - Implement non-pharmacological measures as appropriate and evaluate response  - Consider cultural and social influences on pain and pain management  - Manage/alleviate anxiety  - Utilize distraction and/or relaxation techniques  - Monitor for opioid side effects  - Notify MD/LIP if interventions unsuccessful or patient reports new pain  - Anticipate increased pain with activity and pre-medicate as appropriate  Outcome: Progressing     Problem: SAFETY ADULT - FALL  Goal: Free from fall injury  Description: INTERVENTIONS:  - Assess pt frequently for physical needs  - Identify cognitive and physical deficits and behaviors that affect risk of falls.   - [place] fall precautions as indicated by assessment.  - Educate pt/family on patient safety including physical limitations  - Instruct pt to call for assistance with activity based on assessment  - Modify environment to reduce risk of injury  - Provide assistive devices as appropriate  - Consider OT/PT consult to assist with strengthening/mobility  - Encourage toileting schedule  Outcome: Progressing

## 2023-06-02 ENCOUNTER — APPOINTMENT (OUTPATIENT)
Dept: GENERAL RADIOLOGY | Facility: HOSPITAL | Age: 43
End: 2023-06-02
Attending: EMERGENCY MEDICINE
Payer: COMMERCIAL

## 2023-06-02 ENCOUNTER — APPOINTMENT (OUTPATIENT)
Dept: CT IMAGING | Facility: HOSPITAL | Age: 43
End: 2023-06-02
Attending: EMERGENCY MEDICINE
Payer: COMMERCIAL

## 2023-06-02 ENCOUNTER — HOSPITAL ENCOUNTER (EMERGENCY)
Facility: HOSPITAL | Age: 43
Discharge: HOME | End: 2023-06-02
Attending: EMERGENCY MEDICINE
Payer: COMMERCIAL

## 2023-06-02 VITALS
RESPIRATION RATE: 16 BRPM | OXYGEN SATURATION: 100 % | TEMPERATURE: 97 F | SYSTOLIC BLOOD PRESSURE: 120 MMHG | DIASTOLIC BLOOD PRESSURE: 84 MMHG | HEART RATE: 87 BPM

## 2023-06-02 DIAGNOSIS — J40 BRONCHITIS: ICD-10-CM

## 2023-06-02 DIAGNOSIS — I31.39 PERICARDIAL EFFUSION: Primary | ICD-10-CM

## 2023-06-02 LAB
ALBUMIN SERPL-MCNC: 3.7 G/DL (ref 3.4–5)
ALBUMIN/GLOB SERPL: 0.9 {RATIO} (ref 1–2)
ALP LIVER SERPL-CCNC: 71 U/L
ALT SERPL-CCNC: 18 U/L
ANION GAP SERPL CALC-SCNC: 8 MMOL/L (ref 0–18)
AST SERPL-CCNC: 17 U/L (ref 15–37)
BASOPHILS # BLD AUTO: 0.03 X10(3) UL (ref 0–0.2)
BASOPHILS NFR BLD AUTO: 0.3 %
BILIRUB SERPL-MCNC: 0.7 MG/DL (ref 0.1–2)
BUN BLD-MCNC: 8 MG/DL (ref 7–18)
CALCIUM BLD-MCNC: 9.2 MG/DL (ref 8.5–10.1)
CHLORIDE SERPL-SCNC: 107 MMOL/L (ref 98–112)
CO2 SERPL-SCNC: 22 MMOL/L (ref 21–32)
CREAT BLD-MCNC: 0.67 MG/DL
CRP SERPL-MCNC: 3.85 MG/DL (ref ?–0.3)
EOSINOPHIL # BLD AUTO: 0.11 X10(3) UL (ref 0–0.7)
EOSINOPHIL NFR BLD AUTO: 1.3 %
ERYTHROCYTE [DISTWIDTH] IN BLOOD BY AUTOMATED COUNT: 12.9 %
ERYTHROCYTE [SEDIMENTATION RATE] IN BLOOD: 59 MM/HR
GFR SERPLBLD BASED ON 1.73 SQ M-ARVRAT: 111 ML/MIN/1.73M2 (ref 60–?)
GLOBULIN PLAS-MCNC: 4 G/DL (ref 2.8–4.4)
GLUCOSE BLD-MCNC: 92 MG/DL (ref 70–99)
HCT VFR BLD AUTO: 39.7 %
HGB BLD-MCNC: 13 G/DL
IMM GRANULOCYTES # BLD AUTO: 0.05 X10(3) UL (ref 0–1)
IMM GRANULOCYTES NFR BLD: 0.6 %
LYMPHOCYTES # BLD AUTO: 1.19 X10(3) UL (ref 1–4)
LYMPHOCYTES NFR BLD AUTO: 13.8 %
MCH RBC QN AUTO: 28.2 PG (ref 26–34)
MCHC RBC AUTO-ENTMCNC: 32.7 G/DL (ref 31–37)
MCV RBC AUTO: 86.1 FL
MONOCYTES # BLD AUTO: 0.9 X10(3) UL (ref 0.1–1)
MONOCYTES NFR BLD AUTO: 10.4 %
NEUTROPHILS # BLD AUTO: 6.36 X10 (3) UL (ref 1.5–7.7)
NEUTROPHILS # BLD AUTO: 6.36 X10(3) UL (ref 1.5–7.7)
NEUTROPHILS NFR BLD AUTO: 73.6 %
OSMOLALITY SERPL CALC.SUM OF ELEC: 282 MOSM/KG (ref 275–295)
PLATELET # BLD AUTO: 276 10(3)UL (ref 150–450)
POTASSIUM SERPL-SCNC: 3.5 MMOL/L (ref 3.5–5.1)
PROT SERPL-MCNC: 7.7 G/DL (ref 6.4–8.2)
RBC # BLD AUTO: 4.61 X10(6)UL
SODIUM SERPL-SCNC: 137 MMOL/L (ref 136–145)
TROPONIN I HIGH SENSITIVITY: 3 NG/L
TROPONIN I HIGH SENSITIVITY: 4 NG/L
WBC # BLD AUTO: 8.6 X10(3) UL (ref 4–11)

## 2023-06-02 PROCEDURE — 71045 X-RAY EXAM CHEST 1 VIEW: CPT | Performed by: EMERGENCY MEDICINE

## 2023-06-02 PROCEDURE — 71260 CT THORAX DX C+: CPT | Performed by: EMERGENCY MEDICINE

## 2023-06-02 PROCEDURE — 99285 EMERGENCY DEPT VISIT HI MDM: CPT

## 2023-06-02 PROCEDURE — 85652 RBC SED RATE AUTOMATED: CPT | Performed by: EMERGENCY MEDICINE

## 2023-06-02 PROCEDURE — 85025 COMPLETE CBC W/AUTO DIFF WBC: CPT

## 2023-06-02 PROCEDURE — 85025 COMPLETE CBC W/AUTO DIFF WBC: CPT | Performed by: EMERGENCY MEDICINE

## 2023-06-02 PROCEDURE — 80053 COMPREHEN METABOLIC PANEL: CPT

## 2023-06-02 PROCEDURE — 93010 ELECTROCARDIOGRAM REPORT: CPT

## 2023-06-02 PROCEDURE — 84484 ASSAY OF TROPONIN QUANT: CPT | Performed by: EMERGENCY MEDICINE

## 2023-06-02 PROCEDURE — 80053 COMPREHEN METABOLIC PANEL: CPT | Performed by: EMERGENCY MEDICINE

## 2023-06-02 PROCEDURE — 93005 ELECTROCARDIOGRAM TRACING: CPT

## 2023-06-02 PROCEDURE — 84484 ASSAY OF TROPONIN QUANT: CPT

## 2023-06-02 PROCEDURE — 96376 TX/PRO/DX INJ SAME DRUG ADON: CPT

## 2023-06-02 PROCEDURE — 86140 C-REACTIVE PROTEIN: CPT | Performed by: EMERGENCY MEDICINE

## 2023-06-02 PROCEDURE — 96374 THER/PROPH/DIAG INJ IV PUSH: CPT

## 2023-06-02 RX ORDER — HYDROCODONE BITARTRATE AND ACETAMINOPHEN 5; 325 MG/1; MG/1
1 TABLET ORAL ONCE
Status: COMPLETED | OUTPATIENT
Start: 2023-06-02 | End: 2023-06-02

## 2023-06-02 RX ORDER — PREDNISONE 10 MG/1
TABLET ORAL
Qty: 70 TABLET | Refills: 0 | Status: SHIPPED | OUTPATIENT
Start: 2023-06-02 | End: 2023-06-30

## 2023-06-02 RX ORDER — HYDROCODONE BITARTRATE AND ACETAMINOPHEN 5; 325 MG/1; MG/1
1-2 TABLET ORAL EVERY 6 HOURS PRN
Qty: 10 TABLET | Refills: 0 | Status: SHIPPED | OUTPATIENT
Start: 2023-06-02 | End: 2023-06-07

## 2023-06-02 RX ORDER — HYDROMORPHONE HYDROCHLORIDE 1 MG/ML
0.5 INJECTION, SOLUTION INTRAMUSCULAR; INTRAVENOUS; SUBCUTANEOUS ONCE
Status: COMPLETED | OUTPATIENT
Start: 2023-06-02 | End: 2023-06-02

## 2023-06-02 RX ORDER — PREDNISONE 20 MG/1
40 TABLET ORAL ONCE
Status: COMPLETED | OUTPATIENT
Start: 2023-06-02 | End: 2023-06-02

## 2023-06-02 RX ORDER — ALBUTEROL SULFATE 90 UG/1
2 AEROSOL, METERED RESPIRATORY (INHALATION) EVERY 4 HOURS PRN
Qty: 1 EACH | Refills: 0 | Status: SHIPPED | OUTPATIENT
Start: 2023-06-02 | End: 2023-07-02

## 2023-06-02 NOTE — ED INITIAL ASSESSMENT (HPI)
Patient states c/o chest pain and shortness of breath since last night. Patient has history of chronic pericarditis. Patient rates pain 7 out of 10 on pain scale.

## 2023-06-02 NOTE — Clinical Note
Continue using the colchicine. In addition use the albuterol as prescribed. Return if develop worrisome symptoms or otherwise follow-up with primary care physician and cardiologist as an outpatient.

## 2023-06-03 LAB
ATRIAL RATE: 109 BPM
P AXIS: 47 DEGREES
P-R INTERVAL: 122 MS
Q-T INTERVAL: 338 MS
QRS DURATION: 70 MS
QTC CALCULATION (BEZET): 455 MS
R AXIS: 47 DEGREES
T AXIS: 44 DEGREES
VENTRICULAR RATE: 109 BPM

## 2023-07-05 NOTE — PROGRESS NOTES
Please call and let her know that her thyroid nodule was benign. Can follow up post op as scheduled. [General Appearance - Well Developed] : well developed [General Appearance - Well Nourished] : well nourished [Normal Appearance] : normal appearance [Well Groomed] : well groomed [General Appearance - In No Acute Distress] : no acute distress [Abdomen Soft] : soft [Abdomen Tenderness] : non-tender [Costovertebral Angle Tenderness] : no ~M costovertebral angle tenderness [Edema] : no peripheral edema [] : no respiratory distress [Respiration, Rhythm And Depth] : normal respiratory rhythm and effort [Exaggerated Use Of Accessory Muscles For Inspiration] : no accessory muscle use [Oriented To Time, Place, And Person] : oriented to person, place, and time [Affect] : the affect was normal [Mood] : the mood was normal [Not Anxious] : not anxious [Normal Station and Gait] : the gait and station were normal for the patient's age [No Focal Deficits] : no focal deficits

## 2024-03-08 ENCOUNTER — APPOINTMENT (OUTPATIENT)
Dept: ADMINISTRATIVE | Facility: HOSPITAL | Age: 44
End: 2024-03-08
Payer: COMMERCIAL

## 2024-03-08 RX ORDER — MULTIVITAMIN
1 TABLET ORAL DAILY
COMMUNITY

## 2024-03-08 RX ORDER — WARFARIN SODIUM 7.5 MG/1
7.5 TABLET ORAL NIGHTLY
COMMUNITY

## 2024-03-08 RX ORDER — COLCHICINE 0.6 MG/1
0.6 TABLET ORAL DAILY
COMMUNITY

## 2024-04-03 NOTE — H&P
[facility] GYNECOLOGIC ONCOLOGY  H&P     MEDICAL RECORD #: [id number] DATE OF SERVICE: 4/3/24             Reason for visit:  CHARLES 2     HISTORY OF PRESENT ILLNESS:    Blanche Rogel is a 43 year old female who has had a recent history of LSIL on pap smear 22.  Patient endorses that at Age 20 had colopscopy for abnormal pap and paps have been normal since, until recently. Was planning LEEP procedure which was canceled due to pregnancy, which subsequently miscarried.   HC.9 (10/26/22), 152.3 (10/24/22)- s/p sAB late 2022.  Patient had Cervix biopsy 2022 showed CHARLES 2 at 6:00, acute inflammation x2 and endocervix curettage.  Follows Dr Hernandez, coumadin clinic; s/p stroke . Heterozygous Factor V Leiden.  Follows with cardiology; h/o recurrent pericarditis.  Menses are regular timing; LMP started this morning.  Endorses h/o endometriosis; had laparotomy in .       SUBSEQUENT TREATMENT:     Observation  ER in -pericardial effusion; CT chest showed Stable noncalcified pulmonary nodules in the lungs measuring up to 5 mm.  Saw cardiologist recently in October.    Following with Dr. Ok Hernandez-managing warfarin.    Past Medical History:   Diagnosis Date    Asthma (HCC)     Extrinsic    Blood disorder     Factor V Leiden    Closed fracture of middle or proximal phalanx or phalanges of hand 2013    Log Date: 2013     Colitis     Ulcerative colitis    Endometriosis     Esophageal reflux     Expressive aphasia     Well compensated unless tired    History of 2019 novel coronavirus disease (COVID-19) 2020    Severe Headache, severe body aches, significant flare of ulcerative colitis. Not hospitalized.    Hypokalemia 2020    Hypomagnesemia 2020    Migraines     Pericarditis (HCC)     Reactive arthritis (HCC)     Stroke (HCC) 2020    Expressive aphasia    Visual impairment     Wears Glaases or Contacts     Past Surgical History:   Procedure Laterality  Date    Cholecystectomy  07/03/2020    Colonoscopy      2013    Colonoscopy N/A 11/27/2019    Procedure: COLONOSCOPY;  Surgeon: Qamar Hicks MD;  Location:  ENDOSCOPY    Dilation/curettage,diagnostic      2009    Egd N/A 03/09/2021    Procedure: ESOPHAGOGASTRODUODENOSCOPY, COLONOSCOPY, POSSIBLE BIOPSY, POSSIBLE POLYPECTOMY 37595, 42906;  Surgeon: Qamar Hicks MD;  Location: [facility]    Endometrial ablation      Laparoscopy,diagnostic      Cherelle localization wire 1 site right (cpt=19281) Left 9/2010, 2/11    breast abcess    Other  1998    laproscopic for endometriosis    Thyroidectomy            LAST PAP:   08/02/2023 LSIL     No components found for: \"PPO2\", \"PPO9\", \"PPO28\", \"PPO29\", \"PPO35\", \"HPVL1\", \"HPVZ1\", \"HPVZ2\"     PATHOLOGY:         Lab Results   Component Value Date     FINALDX   11/21/2023       Cervix biopsy, 5:00:  -High-grade squamous intraepithelial lesion (HSIL, CHARLES-2).                TUMOR MARKERS:     No results found for: \"\"  No results found for: \"CEA\"  No results found for: \"\"                 LAST SCREENING MAMMOGRAM:  11/2021     RADIOLOGY:  CT Chest/Abdomin/Pelvis: No results found for this or any previous visit from the past 365 days.     CT Abdomin + Pelvis: No results found for this or any previous visit from the past 365 days.     US Pelvis (transabdominal and transvaginal): No results found for this or any previous visit from the past 365 days.     MRI Pelvis (soft tissue): No results found for this or any previous visit from the past 365 days.     PET Standard: No results found for this or any previous visit from the past 365 days.     US Vaginal Pelvic:No results found for this or any previous visit from the past 365 days.     CT Abdomen + Pelvis (contrast only): No results found for this or any previous visit from the past 365 days.     US Pelvis (transabdominal and transvaginal): No results found for this or any previous visit from the past 365  days.     IR Biopsy - ABD or Retro Mass: No results found for this or any previous visit from the past 365 days.        Patient's medications, allergies, past medical, surgical, social and family histories were reviewed and updated as appropriate.     ROS:  Postmenopausal: No    Patient denies any problems with bowel or bladder. She denies any vaginal bleeding between periods, discharge, odor, itching or burning. Patient denies any pelvic pain or persistent bloating. Patient denies any skin changes. She states her appetite is good and energy is good.  The remainder of the 12 point ROS was reviewed and neg.       PHYSICAL EXAMINATION:  /82   Ht 5' 6\" (1.676 m)   Wt 165 lb (74.8 kg)   LMP 07/20/2023 (Approximate)   BMI 26.63 kg/m²   Body mass index is 26.63 kg/m².    GENERAL: alert and oriented, cooperative, in no acute distress  HEENT: extra ocular movement intact  THYROID: normal to inspection and palpation  LYMPH NODES: Cervical, supraclavicular, and axillary nodes normal.  LUNGS: clear to auscultation bilaterally  HEART: regular rate and rhythm  ABDOMEN: soft, non-tender. Bowel sounds normal. No masses,  no organomegaly  EXTREMITIES: extremities normal, atraumatic, no cyanosis or edema  NEUROLOGIC: motor grossly intact  VULVA: normal appearing vulva with no masses, tenderness or lesions  : normal without discharge or scarring  VAGINA: normal mucosa without prolapse or lesions  CERVIX: Diffuse AW changes around the transitional zone consistent with CHARLES 2 seen on 11/21 colpo  UTERUS: normal single, nontender  LEFT ADNEXA: normal bimanual exam  RIGHT ADNEXA:  normal bimanual exam  RECTOVAGINAL: deferred  BACK: symmetric, no curvature. ROM normal. No CVA tenderness.     ASSESSMENT:     Diagnoses and all orders for this visit:     Dysplasia of cervix, high grade CHARLES 2           43 year old female with a history ofabnormal pap and likely immunocompromised status - on medications for Ulcerative colitis       July 2022 pap smear showing LSIL     Sept 2022 cervical biopsy showing CIN2 at 6:00      08/02/2023 LSIL     The index of suspicion for malignancy is low     Comorbidities:  US, h/o stroke and Factor VI Leiden on coumadin, endometriosis, laparotomy , Ulcerative colitis      PLAN:    Patient given options of alternative treatment/surgical intervention including observation. Due to CHARLES 2 lesion found on previous colposcopic biopsy and diffuse changes around the cervical os we previously recommended surgical excision for diagnostic and therapeutic purposes. However, pt had other health issues including repeat pericarditis causing treatment to be delayed.     Discussed with pt given the amount of time that has passed there is a need for repeat cervical biopsy to assess for worsening disease. Pt desires to wait until April for surgery as she will be finished up another treatment at that time. Since biopsy is consistent with CHARLES 2 we discussed the possibility of doing surgery in March per pt wishes, however we discussed this would not be ideal given that treatment has already been delayed a year.      Surgical intervention planned: Cervical cold knife conization, ECC     Patient counseled on Risk/Benefits/Alternatives/Indications risk.  Risk discussed includes but not limited to anesthesia, bleeding, death, infection, injury to adjacent organs (including GI, , Vascular and Neurologic Structures), need for further operation, need for further therapy, transfusion, Venous Thrombolytic Disease, wound breakdown dehiscence, lymphedema, possible inability to stage, possible dissemination of disease.     Medical Clearance: obtained  Anti-Coagulation Addressed:  held coumadin 3 days prior to procedure, bridged with LMWH 60mg daily    The patient verbalized good understanding of this.  I will keep you informed of her progress.  Thank you for allowing me to participate in the care of this patient.          Julia Shine,  KEMAL

## 2024-04-04 ENCOUNTER — HOSPITAL ENCOUNTER (OUTPATIENT)
Facility: HOSPITAL | Age: 44
Setting detail: HOSPITAL OUTPATIENT SURGERY
Discharge: HOME | End: 2024-04-04
Attending: OBSTETRICS & GYNECOLOGY | Admitting: OBSTETRICS & GYNECOLOGY
Payer: COMMERCIAL

## 2024-04-04 ENCOUNTER — ANESTHESIA (OUTPATIENT)
Dept: SURGERY | Facility: HOSPITAL | Age: 44
End: 2024-04-04
Payer: COMMERCIAL

## 2024-04-04 ENCOUNTER — ANESTHESIA EVENT (OUTPATIENT)
Dept: SURGERY | Facility: HOSPITAL | Age: 44
End: 2024-04-04
Payer: COMMERCIAL

## 2024-04-04 VITALS
HEIGHT: 66.5 IN | OXYGEN SATURATION: 97 % | DIASTOLIC BLOOD PRESSURE: 75 MMHG | TEMPERATURE: 98 F | HEART RATE: 84 BPM | BODY MASS INDEX: 26.2 KG/M2 | RESPIRATION RATE: 16 BRPM | WEIGHT: 165 LBS | SYSTOLIC BLOOD PRESSURE: 119 MMHG

## 2024-04-04 DIAGNOSIS — N87.1 DYSPLASIA OF CERVIX, HIGH GRADE CIN 2: Primary | ICD-10-CM

## 2024-04-04 LAB
B-HCG UR QL: NEGATIVE
INR BLD: 1.17 (ref 0.8–1.2)
PROTHROMBIN TIME: 14.9 SECONDS (ref 11.6–14.8)

## 2024-04-04 PROCEDURE — 81025 URINE PREGNANCY TEST: CPT

## 2024-04-04 PROCEDURE — 85610 PROTHROMBIN TIME: CPT | Performed by: OBSTETRICS & GYNECOLOGY

## 2024-04-04 PROCEDURE — 88305 TISSUE EXAM BY PATHOLOGIST: CPT | Performed by: OBSTETRICS & GYNECOLOGY

## 2024-04-04 PROCEDURE — 88307 TISSUE EXAM BY PATHOLOGIST: CPT | Performed by: OBSTETRICS & GYNECOLOGY

## 2024-04-04 PROCEDURE — 0UBC7ZX EXCISION OF CERVIX, VIA NATURAL OR ARTIFICIAL OPENING, DIAGNOSTIC: ICD-10-PCS | Performed by: OBSTETRICS & GYNECOLOGY

## 2024-04-04 PROCEDURE — 88342 IMHCHEM/IMCYTCHM 1ST ANTB: CPT | Performed by: OBSTETRICS & GYNECOLOGY

## 2024-04-04 RX ORDER — HYDROCODONE BITARTRATE AND ACETAMINOPHEN 5; 325 MG/1; MG/1
1 TABLET ORAL EVERY 4 HOURS PRN
Qty: 10 TABLET | Refills: 0 | Status: SHIPPED | OUTPATIENT
Start: 2024-04-04

## 2024-04-04 RX ORDER — HYDROCODONE BITARTRATE AND ACETAMINOPHEN 5; 325 MG/1; MG/1
2 TABLET ORAL ONCE AS NEEDED
Status: COMPLETED | OUTPATIENT
Start: 2024-04-04 | End: 2024-04-04

## 2024-04-04 RX ORDER — HYDROMORPHONE HYDROCHLORIDE 1 MG/ML
0.2 INJECTION, SOLUTION INTRAMUSCULAR; INTRAVENOUS; SUBCUTANEOUS EVERY 5 MIN PRN
Status: DISCONTINUED | OUTPATIENT
Start: 2024-04-04 | End: 2024-04-04

## 2024-04-04 RX ORDER — DEXAMETHASONE SODIUM PHOSPHATE 4 MG/ML
VIAL (ML) INJECTION AS NEEDED
Status: DISCONTINUED | OUTPATIENT
Start: 2024-04-04 | End: 2024-04-04 | Stop reason: SURG

## 2024-04-04 RX ORDER — MEPERIDINE HYDROCHLORIDE 25 MG/ML
12.5 INJECTION INTRAMUSCULAR; INTRAVENOUS; SUBCUTANEOUS ONCE AS NEEDED
Status: DISCONTINUED | OUTPATIENT
Start: 2024-04-04 | End: 2024-04-04

## 2024-04-04 RX ORDER — ACETAMINOPHEN 500 MG
1000 TABLET ORAL ONCE AS NEEDED
Status: COMPLETED | OUTPATIENT
Start: 2024-04-04 | End: 2024-04-04

## 2024-04-04 RX ORDER — HYDROMORPHONE HYDROCHLORIDE 1 MG/ML
INJECTION, SOLUTION INTRAMUSCULAR; INTRAVENOUS; SUBCUTANEOUS
Status: COMPLETED
Start: 2024-04-04 | End: 2024-04-04

## 2024-04-04 RX ORDER — ACETAMINOPHEN 500 MG
1000 TABLET ORAL ONCE
Status: DISCONTINUED | OUTPATIENT
Start: 2024-04-04 | End: 2024-04-04 | Stop reason: HOSPADM

## 2024-04-04 RX ORDER — LIDOCAINE HYDROCHLORIDE 10 MG/ML
INJECTION, SOLUTION EPIDURAL; INFILTRATION; INTRACAUDAL; PERINEURAL AS NEEDED
Status: DISCONTINUED | OUTPATIENT
Start: 2024-04-04 | End: 2024-04-04 | Stop reason: SURG

## 2024-04-04 RX ORDER — MEPERIDINE HYDROCHLORIDE 25 MG/ML
12.5 INJECTION INTRAMUSCULAR; INTRAVENOUS; SUBCUTANEOUS ONCE
Status: COMPLETED | OUTPATIENT
Start: 2024-04-04 | End: 2024-04-04

## 2024-04-04 RX ORDER — PROCHLORPERAZINE EDISYLATE 5 MG/ML
5 INJECTION INTRAMUSCULAR; INTRAVENOUS EVERY 8 HOURS PRN
Status: DISCONTINUED | OUTPATIENT
Start: 2024-04-04 | End: 2024-04-04

## 2024-04-04 RX ORDER — SODIUM CHLORIDE, SODIUM LACTATE, POTASSIUM CHLORIDE, CALCIUM CHLORIDE 600; 310; 30; 20 MG/100ML; MG/100ML; MG/100ML; MG/100ML
INJECTION, SOLUTION INTRAVENOUS CONTINUOUS
Status: DISCONTINUED | OUTPATIENT
Start: 2024-04-04 | End: 2024-04-04

## 2024-04-04 RX ORDER — SCOLOPAMINE TRANSDERMAL SYSTEM 1 MG/1
1 PATCH, EXTENDED RELEASE TRANSDERMAL ONCE
Status: DISCONTINUED | OUTPATIENT
Start: 2024-04-04 | End: 2024-04-04 | Stop reason: HOSPADM

## 2024-04-04 RX ORDER — ONDANSETRON 2 MG/ML
INJECTION INTRAMUSCULAR; INTRAVENOUS AS NEEDED
Status: DISCONTINUED | OUTPATIENT
Start: 2024-04-04 | End: 2024-04-04 | Stop reason: SURG

## 2024-04-04 RX ORDER — MEPERIDINE HYDROCHLORIDE 25 MG/ML
INJECTION INTRAMUSCULAR; INTRAVENOUS; SUBCUTANEOUS
Status: COMPLETED
Start: 2024-04-04 | End: 2024-04-04

## 2024-04-04 RX ORDER — HYDROMORPHONE HYDROCHLORIDE 1 MG/ML
0.4 INJECTION, SOLUTION INTRAMUSCULAR; INTRAVENOUS; SUBCUTANEOUS EVERY 5 MIN PRN
Status: DISCONTINUED | OUTPATIENT
Start: 2024-04-04 | End: 2024-04-04

## 2024-04-04 RX ORDER — ONDANSETRON 2 MG/ML
4 INJECTION INTRAMUSCULAR; INTRAVENOUS EVERY 6 HOURS PRN
Status: DISCONTINUED | OUTPATIENT
Start: 2024-04-04 | End: 2024-04-04

## 2024-04-04 RX ORDER — VASOPRESSIN 20 U/ML
INJECTION PARENTERAL AS NEEDED
Status: DISCONTINUED | OUTPATIENT
Start: 2024-04-04 | End: 2024-04-04 | Stop reason: HOSPADM

## 2024-04-04 RX ORDER — HYDROCODONE BITARTRATE AND ACETAMINOPHEN 5; 325 MG/1; MG/1
1 TABLET ORAL ONCE AS NEEDED
Status: COMPLETED | OUTPATIENT
Start: 2024-04-04 | End: 2024-04-04

## 2024-04-04 RX ORDER — DIPHENHYDRAMINE HYDROCHLORIDE 50 MG/ML
12.5 INJECTION INTRAMUSCULAR; INTRAVENOUS AS NEEDED
Status: DISCONTINUED | OUTPATIENT
Start: 2024-04-04 | End: 2024-04-04

## 2024-04-04 RX ORDER — HYDROMORPHONE HYDROCHLORIDE 1 MG/ML
0.6 INJECTION, SOLUTION INTRAMUSCULAR; INTRAVENOUS; SUBCUTANEOUS EVERY 5 MIN PRN
Status: DISCONTINUED | OUTPATIENT
Start: 2024-04-04 | End: 2024-04-04

## 2024-04-04 RX ORDER — CEFAZOLIN SODIUM 1 G/3ML
INJECTION, POWDER, FOR SOLUTION INTRAMUSCULAR; INTRAVENOUS AS NEEDED
Status: DISCONTINUED | OUTPATIENT
Start: 2024-04-04 | End: 2024-04-04 | Stop reason: SURG

## 2024-04-04 RX ORDER — NALOXONE HYDROCHLORIDE 0.4 MG/ML
0.08 INJECTION, SOLUTION INTRAMUSCULAR; INTRAVENOUS; SUBCUTANEOUS AS NEEDED
Status: DISCONTINUED | OUTPATIENT
Start: 2024-04-04 | End: 2024-04-04

## 2024-04-04 RX ADMIN — DEXAMETHASONE SODIUM PHOSPHATE 4 MG: 4 MG/ML VIAL (ML) INJECTION at 14:55:00

## 2024-04-04 RX ADMIN — ONDANSETRON 4 MG: 2 INJECTION INTRAMUSCULAR; INTRAVENOUS at 15:04:00

## 2024-04-04 RX ADMIN — CEFAZOLIN SODIUM 2 G: 1 INJECTION, POWDER, FOR SOLUTION INTRAMUSCULAR; INTRAVENOUS at 15:13:00

## 2024-04-04 RX ADMIN — LIDOCAINE HYDROCHLORIDE 50 MG: 10 INJECTION, SOLUTION EPIDURAL; INFILTRATION; INTRACAUDAL; PERINEURAL at 14:55:00

## 2024-04-04 RX ADMIN — SODIUM CHLORIDE, SODIUM LACTATE, POTASSIUM CHLORIDE, CALCIUM CHLORIDE: 600; 310; 30; 20 INJECTION, SOLUTION INTRAVENOUS at 14:55:00

## 2024-04-04 NOTE — ANESTHESIA POSTPROCEDURE EVALUATION
[facility]    Blanche Rogel Patient Status:  Hospital Outpatient Surgery   Age/Gender 43 year old female MRN [id number]   Location [facility] SURGERY Attending Landen Gomez MD   Hosp Day # 0 PCP Moris Kim MD       Anesthesia Post-op Note    CERVICAL COLD KNIFE CONIZATION, ENDOCERVICAL CURETTAGE    Procedure Summary       Date: 04/04/24 Room / Location:  MAIN OR [address] [place] [address] MAIN OR    Anesthesia Start: 1455 Anesthesia Stop: 1545    Procedure: CERVICAL COLD KNIFE CONIZATION, ENDOCERVICAL CURETTAGE (Vagina ) Diagnosis: (DYSPLASIA OF CERVIX, HIGH GRADE)    Surgeons: Landen Gomez MD Anesthesiologist: Chris Gurrola MD    Anesthesia Type: general ASA Status: 3            Anesthesia Type: general    Vitals Value Taken Time   /73 04/04/24 1549   Temp 99.5 °F (37.5 °C) 04/04/24 1549   Pulse 88 04/04/24 1550   Resp 14 04/04/24 1550   SpO2 100 % 04/04/24 1550   Vitals shown include unfiled device data.    Patient Location: PACU    Anesthesia Type: general    Airway Patency: patent    Postop Pain Control: adequate    Mental Status: mildly sedated but able to meaningfully participate in the post-anesthesia evaluation    Nausea/Vomiting: none    Cardiopulmonary/Hydration status: stable euvolemic    Complications: no apparent anesthesia related complications    Postop vital signs: stable    Comments: Pt breathing comfortably, VSS, sleepy but arousable. Report to RN.     Dental Exam: Unchanged from Preop    Patient to be discharged from PACU when criteria met.

## 2024-04-04 NOTE — DISCHARGE SUMMARY
Outpatient Surgery Brief Discharge Summary         Patient ID:  Blanche Rogel  [id number]  43 year old  7/30/1980    Discharge Diagnoses: DYSPLASIA OF CERVIX, HIGH GRADE    Procedures: CERVICAL COLD KNIFE CONIZATION, ENDOCERVICAL CURETTAGE     Discharged Condition: stable    Disposition: home    Patient Instructions: Follow-up with PORSHA Mtz in 1-2 weeks.    Diet: regular diet  Activity: No lifting greater than 10lbs, no tub bathes/swimming, no driving, nothing in the vagina.      Meds:  Norco 5/325mg PRN for pain  Resume coumadin tonight 4/4/24  Start LMWH tomorrow 4/5/24    Julia Shine PA-C  4/4/2024  3:59 PM     DH: Received patient sign-out. Currently pending CTA for elevated dimer. Will follow up on results and admit for syncope work up. Bounce back to FELICITA Reaves. DH: FELIC[facility] Reaves accepted admission. Discussed plan with patient and family - all questions answered.

## 2024-04-04 NOTE — ANESTHESIA PREPROCEDURE EVALUATION
PRE-OP EVALUATION    Patient Name: Blanche Rogel    Admit Diagnosis: DYSPLASIA OF CERVIX, HIGH GRADE    Pre-op Diagnosis: DYSPLASIA OF CERVIX, HIGH GRADE    CERVICAL COLD KNIFE CONIZATION, ENDOCERVICAL CURETTAGE    Anesthesia Procedure: CERVICAL COLD KNIFE CONIZATION, ENDOCERVICAL CURETTAGE    Surgeon(s) and Role:     * Landen Gomez MD - Primary    Pre-op vitals reviewed.  Temp: 99 °F (37.2 °C)  Pulse: 74  Resp: 18  BP: 134/75  SpO2: 100 %  Body mass index is 26.23 kg/m².    Current medications reviewed.  Hospital Medications:   [MAR Hold] acetaminophen (Tylenol Extra Strength) tab 1,000 mg  1,000 mg Oral Once    [MAR Hold] scopolamine (Transderm-Scop) 1 MG/3DAYS patch 1 patch  1 patch Transdermal Once    lactated ringers infusion   Intravenous Continuous       Outpatient Medications:     Medications Prior to Admission   Medication Sig Dispense Refill Last Dose    warfarin 7.5 MG Oral Tab Take 1 tablet (7.5 mg total) by mouth nightly. Sunday and Tuesday 7.5mg  10mg on other days   3/31/2024 at 900    colchicine 0.6 MG Oral Tab Take 1 tablet (0.6 mg total) by mouth daily.   3/28/2024    Multiple Vitamin Oral Tab Take 1 tablet by mouth daily.       Vedolizumab (ENTYVIO IV) Inject into the vein.   3/1/2024    HYDROcodone-acetaminophen 5-325 MG Oral Tab Take 1 tablet by mouth every 4 (four) hours as needed. 20 tablet 0 More than a month    Albuterol Sulfate  (90 Base) MCG/ACT Inhalation Aero Soln Inhale 2 puffs into the lungs every 4 (four) hours as needed for Wheezing.   More than a month    Albuterol Sulfate (VENTOLIN) (2.5 MG/3ML) 0.083% Inhalation Nebu Soln Take 3 mL (2.5 mg total) by nebulization every 4 (four) hours as needed for Wheezing or Shortness of Breath.   More than a month       Allergies: Cephalexin, Ibuprofen, Augmentin [amoxicillin-pot clavulanate], Azathioprine, Clindamycin, Morphine, and Sulfa antibiotics      Anesthesia Evaluation    Patient summary reviewed.    Anesthetic  Complications  (-) history of anesthetic complications         GI/Hepatic/Renal      (+) GERD                    (+) ulcerative colitis       Cardiovascular      ECG reviewed.                                                 Endo/Other                                  Pulmonary      (+) asthma                     Neuro/Psych          (+) CVA         (+) headaches           Abdominal pain Acute chest pain  Asthma (HCC) Bloody diarrhea  Endometriosis of uterus History of stroke  Hypercoagulable state (HCC) Long term (current) use of anticoagulants  Menstrual migraine, not intractable, without status migrainosus Nausea  Odynophagia Pericardial effusion (HCC)  Pleural effusion Thyroid nodule  Ulcerative chronic pancolitis without complications (HCC) W            Past Surgical History:   Procedure Laterality Date    CHOLECYSTECTOMY  2020    COLONOSCOPY          COLONOSCOPY N/A 2019    Procedure: COLONOSCOPY;  Surgeon: Qamar Hicks MD;  Location:  ENDOSCOPY    DILATION/CURETTAGE,DIAGNOSTIC          EGD N/A 2021    Procedure: ESOPHAGOGASTRODUODENOSCOPY, COLONOSCOPY, POSSIBLE BIOPSY, POSSIBLE POLYPECTOMY 43548, 21189;  Surgeon: Qamar Hicks MD;  Location: [facility], [facility]    ENDOMETRIAL ABLATION      LAPAROSCOPY,DIAGNOSTIC      VIKA LOCALIZATION WIRE 1 SITE RIGHT (CPT=19281) Left 2010,     breast abcess    OTHER      laproscopic for endometriosis    THYROIDECTOMY       Social History     Socioeconomic History    Marital status:    Tobacco Use    Smoking status: Former     Packs/day: 0     Types: Cigarettes     Quit date: 2000     Years since quittin.7    Smokeless tobacco: Never   Vaping Use    Vaping Use: Never used   Substance and Sexual Activity    Alcohol use: Yes     Comment: Very occasionally    Drug use: No     History   Drug Use No     Available pre-op labs reviewed.        Lab Results   Component Value Date    INR 1.17 2024          Airway      Mallampati: I  Mouth opening: >3 FB  TM distance: > 6 cm  Neck ROM: limited Cardiovascular    Cardiovascular exam normal.         Dental    Dentition appears grossly intact         Pulmonary    Pulmonary exam normal.                 Other findings              ASA: 3   Plan: general  NPO status verified and           Plan/risks discussed with: patient                Present on Admission:  **None**

## 2024-04-04 NOTE — BRIEF OP NOTE
Pre-Operative Diagnosis: DYSPLASIA OF CERVIX, HIGH GRADE     Post-Operative Diagnosis: DYSPLASIA OF CERVIX, HIGH GRADE      Procedure Performed:   CERVICAL COLD KNIFE CONIZATION, ENDOCERVICAL CURETTAGE    Surgeon(s) and Role:     * Landen Gomez MD - Primary    Assistant(s):  PA: Julia Shine PA     Surgical Findings: nabothian cysts, see full op report     Specimen: sent to pathology     Estimated Blood Loss: Blood Output: 10 mL (4/4/2024  3:33 PM)      Dictation Number:  TBD    PORSHA Mtz  4/4/2024  3:47 PM

## 2024-04-04 NOTE — DISCHARGE INSTRUCTIONS
Start your coumadin tonight on 4/4/24. Start your Lovenox tomorrow, 4/5/24.        Cold Knife Cone Discharge Instructions    Dr. Gomez      After surgery you may have some light vaginal bleeding. This may last up to a week and is not a concern unless it is heavier than a menstrual period. You can expect a watery discharge which may last for up to 4-6 weeks.    You should avoid tampons for two weeks after surgery. Also no intercourse for two weeks.    You may experience cramping the day/evening of surgery. This is usually relieved with over the counter Acetaminophen (Tylenol). A prescription pain medicine may be ordered by your physician.     You should rest the day of surgery. No driving for 24 hours after general anesthesia or sedation or if you are taking prescription pain medications. You may resume normal activities the next day. Showering is fine the day after surgery. No tub bathes or swimming pools for 2 weeks after the procedure. Exercise is fine the next day if you are comfortable doing it. No lifting greater than 10 pounds for 2 weeks.    You may resume your normal diet once your appetite returns after surgery.  Some patients will experience nausea from anesthesia or narcotics: we recommend you start with a light diet. Do not drink alcohol or use other sedating medications (sleep aids, sedating cold medications) if taking prescription pain medications. Resume your normal medications as instructed.    Please call our office if you experience any of the following symptoms:  Temperature over 100.5 degrees  Vaginal bleeding heavier than a moderate period  Severe abdominal/pelvic pain  Shortness of breath or chest pain  New onset of leg pain and/or swelling    A specimen was sent to pathology, you can expect the results at your 2 weeks post op appointment.    Please call with any other questions or concerns.  [phone]      Home Care Instructions  LEEP  CERVIX    ACTIVITY  No lifting more than 5 pounds for  2-4 weeks    BATHING  No baths for 2 weeks. You may shower..    DIET  As tolerated, no restrictions.    DRIVING  As tolerated, no restrictions.    DRESSING CHANGES  Use jonah-pads ONLY. No tampons for 4 weeks.  No intercourse or douching for 4 weeks.  Expect vaginal discharge for 4 weeks following the procedure.    PAIN CONTROL  May use Motrin 400mg every 6-8 hours as needed    CALL DR IF:  ?? you run a temperature 101 or greater  ?? Call if you notice uncontrolled vaginal bleeding  ?? Call with any concerns or questions      FOLLOW-UP APPOINTMENT  Call the office to make a 1 month follow-up appointment      Dr. Gomez  [phone]

## 2024-04-04 NOTE — ANESTHESIA PROCEDURE NOTES
Airway  Date/Time: 4/4/2024 2:57 PM  Urgency: elective    Airway not difficult    General Information and Staff    Patient location during procedure: OR  Anesthesiologist: Chris Gurrola MD  Resident/CRNA: Molly Bishop CRNA  Performed: CRNA   Performed by: Molly Bishop CRNA  Authorized by: Chris Gurrola MD      Indications and Patient Condition  Indications for airway management: anesthesia  Spontaneous Ventilation: absent  Sedation level: deep  Preoxygenated: yes  Patient position: sniffing  Mask difficulty assessment: 1 - vent by mask    Final Airway Details  Final airway type: supraglottic airway      Successful airway: classic  Size 3       Number of attempts at approach: 1

## 2024-04-05 NOTE — OPERATIVE REPORT
[facility]    PATIENT'S NAME: BLANE HOUSE   ATTENDING PHYSICIAN: Landen Gomez MD   OPERATING PHYSICIAN: Landen Gomez MD   PATIENT ACCOUNT#:   [id number]    LOCATION:  [facility] 4 EDW 10  MEDICAL RECORD #:   [id number]       YOB: 1980  ADMISSION DATE:       04/04/2024      OPERATION DATE:  04/04/2024    OPERATIVE REPORT    PREOPERATIVE DIAGNOSIS:    1.   High-grade cervical dysplasia CHARLES 2.  2.   Factor V Leiden, on anticoagulation.   POSTOPERATIVE DIAGNOSIS:    1.   High-grade cervical dysplasia CHARLES 2.  2.   Factor V Leiden, on anticoagulation.   3.   Pending final pathology.  PROCEDURE:  Cervical cold knife conization and endocervical curettings.    ESTIMATED BLOOD LOSS:  10 mL.    COMPLICATIONS:  None.    CONDITION:  Stable to recovery room.    DISPOSITION:  Home.    INDICATIONS:  This is a 43-year-old female with a history of abnormal Pap smears since 2022.  Patient had a low-grade squamous intraepithelial lesion at that time.  Colposcopy and biopsies revealed CHARLES 2.  Patient subsequently had some issues with pericarditis and was lost to followup.  Presented last year in August with repeat Pap smear which again showed low-grade squamous intraepithelial lesion.  Patient was referred to me.  I performed colposcopy and biopsies earlier this year which confirmed CHARLES 2 and there was diffuse areas of changes consistent with CHARLES 2.  Patient wanted to wait until April to finish other treatment that she is undergoing.  She was counseled on need for cervical cold knife conization for diagnostic and possibly therapeutic purposes.  She was counseled on risks, benefits, alternatives, and indications, and informed consent was obtained.    FINDINGS:  Patient has a fairly bulbous cervix with multiple cystic areas consistent with nabothian cyst.  There was no obvious area of malignancy.  The patient has approximately a stage 2 uterine prolapse.    OPERATIVE TECHNIQUE:  Patient was taken to  the operating room where she was given LMA anesthesia.  She was prepped and draped in usual sterile fashion.  The bladder was emptied with a red rubber catheter.  Weighted speculum was placed.  Anterior lip of the cervix was grasped with a tenaculum.  Dilute solution of vasopressin was injected circumferentially along the face of the cervix in the stromal area.  Approximately 10 mL was used.  Stay sutures at 3 o'clock and 9 o'clock were placed using a Vicryl suture to ensure hemostasis and retraction.  Using an 11 blade, a generous cervical conization was performed both in width and in depth.  We went through a depth of approximately 1.5 to 2 cm and of width that was approximately 3 to 3.5 cm.  The specimen was removed intact and sutured at 12 o'clock for orientation purposes.  I early cauterized the base of the cone given her chronic anticoagulation and plan for resuming anticoagulation tomorrow.  In addition, even though there was good hemostasis, I placed Sturmdorf sutures along the periphery of the cone to further attain hemostasis.  Additional figure-of-eight suture was placed in the posterior aspect of the cone bed where there was some ongoing bleeding.  Monsel solution was applied and at this point complete hemostasis was seen.  The crater created by the cone was packed with Surgicel strips, and the 2 sutures were tied together gently.  Tenaculum was removed.  No ongoing bleeding was seen.  Sponge, instrument, and needle counts were reported as correct x2.  I was present and scrubbed for the entire procedure.    Patient will be taken to recovery room.  She will be observed, and if she does well and there is no ongoing bleeding, then she will be discharged home.  She has instruction for resuming Coumadin today and anticoagulation with Lovenox tomorrow.     Dictated By Landen Gomez MD  d: 04/04/2024 15:49:11  t: 04/04/2024 23:05:57  Job [id number]  /

## 2024-04-29 ENCOUNTER — APPOINTMENT (OUTPATIENT)
Dept: GENERAL RADIOLOGY | Facility: HOSPITAL | Age: 44
End: 2024-04-29
Payer: COMMERCIAL

## 2024-04-29 ENCOUNTER — HOSPITAL ENCOUNTER (OUTPATIENT)
Facility: HOSPITAL | Age: 44
Setting detail: OBSERVATION
Discharge: HOME | End: 2024-05-01
Attending: EMERGENCY MEDICINE | Admitting: HOSPITALIST
Payer: COMMERCIAL

## 2024-04-29 DIAGNOSIS — Z86.2 HISTORY OF IMMUNOCOMPROMISED STATE: ICD-10-CM

## 2024-04-29 DIAGNOSIS — J45.41 MODERATE PERSISTENT ASTHMA WITH EXACERBATION (HCC): Primary | ICD-10-CM

## 2024-04-29 LAB
ADENOVIRUS PCR:: NOT DETECTED
ALBUMIN SERPL-MCNC: 3.2 G/DL (ref 3.4–5)
ALBUMIN/GLOB SERPL: 0.6 {RATIO} (ref 1–2)
ALP LIVER SERPL-CCNC: 74 U/L
ALT SERPL-CCNC: 23 U/L
ANION GAP SERPL CALC-SCNC: 5 MMOL/L (ref 0–18)
AST SERPL-CCNC: 18 U/L (ref 15–37)
B PARAPERT DNA SPEC QL NAA+PROBE: NOT DETECTED
B PERT DNA SPEC QL NAA+PROBE: NOT DETECTED
BASOPHILS # BLD AUTO: 0.02 X10(3) UL (ref 0–0.2)
BASOPHILS NFR BLD AUTO: 0.2 %
BILIRUB SERPL-MCNC: 0.7 MG/DL (ref 0.1–2)
BILIRUB UR QL STRIP.AUTO: NEGATIVE
BUN BLD-MCNC: 6 MG/DL (ref 9–23)
C PNEUM DNA SPEC QL NAA+PROBE: NOT DETECTED
CALCIUM BLD-MCNC: 8.9 MG/DL (ref 8.5–10.1)
CHLORIDE SERPL-SCNC: 108 MMOL/L (ref 98–112)
CLARITY UR REFRACT.AUTO: CLEAR
CO2 SERPL-SCNC: 23 MMOL/L (ref 21–32)
COLOR UR AUTO: YELLOW
CORONAVIRUS 229E PCR:: NOT DETECTED
CORONAVIRUS HKU1 PCR:: NOT DETECTED
CORONAVIRUS NL63 PCR:: NOT DETECTED
CORONAVIRUS OC43 PCR:: NOT DETECTED
CREAT BLD-MCNC: 0.67 MG/DL
EGFRCR SERPLBLD CKD-EPI 2021: 110 ML/MIN/1.73M2 (ref 60–?)
EOSINOPHIL # BLD AUTO: 0.03 X10(3) UL (ref 0–0.7)
EOSINOPHIL NFR BLD AUTO: 0.3 %
ERYTHROCYTE [DISTWIDTH] IN BLOOD BY AUTOMATED COUNT: 12 %
FLUAV H3 RNA SPEC QL NAA+PROBE: DETECTED
FLUBV RNA SPEC QL NAA+PROBE: NOT DETECTED
GLOBULIN PLAS-MCNC: 5.2 G/DL (ref 2.8–4.4)
GLUCOSE BLD-MCNC: 92 MG/DL (ref 70–99)
GLUCOSE UR STRIP.AUTO-MCNC: NORMAL MG/DL
HCT VFR BLD AUTO: 38.2 %
HGB BLD-MCNC: 12.9 G/DL
IMM GRANULOCYTES # BLD AUTO: 0.05 X10(3) UL (ref 0–1)
IMM GRANULOCYTES NFR BLD: 0.5 %
INR BLD: 2.42 (ref 0.8–1.2)
KETONES UR STRIP.AUTO-MCNC: 10 MG/DL
LACTATE SERPL-SCNC: 1.2 MMOL/L (ref 0.4–2)
LEUKOCYTE ESTERASE UR QL STRIP.AUTO: 500
LYMPHOCYTES # BLD AUTO: 1.21 X10(3) UL (ref 1–4)
LYMPHOCYTES NFR BLD AUTO: 13 %
MAGNESIUM SERPL-MCNC: 2.3 MG/DL (ref 1.6–2.6)
MCH RBC QN AUTO: 29.2 PG (ref 26–34)
MCHC RBC AUTO-ENTMCNC: 33.8 G/DL (ref 31–37)
MCV RBC AUTO: 86.4 FL
METAPNEUMOVIRUS PCR:: NOT DETECTED
MONOCYTES # BLD AUTO: 1.21 X10(3) UL (ref 0.1–1)
MONOCYTES NFR BLD AUTO: 13 %
MYCOPLASMA PNEUMONIA PCR:: NOT DETECTED
NEUTROPHILS # BLD AUTO: 6.76 X10 (3) UL (ref 1.5–7.7)
NEUTROPHILS # BLD AUTO: 6.76 X10(3) UL (ref 1.5–7.7)
NEUTROPHILS NFR BLD AUTO: 73 %
NITRITE UR QL STRIP.AUTO: NEGATIVE
NT-PROBNP SERPL-MCNC: 79 PG/ML (ref ?–125)
OSMOLALITY SERPL CALC.SUM OF ELEC: 279 MOSM/KG (ref 275–295)
PARAINFLUENZA 1 PCR:: NOT DETECTED
PARAINFLUENZA 2 PCR:: NOT DETECTED
PARAINFLUENZA 3 PCR:: NOT DETECTED
PARAINFLUENZA 4 PCR:: NOT DETECTED
PH UR STRIP.AUTO: 7 [PH] (ref 5–8)
PLATELET # BLD AUTO: 357 10(3)UL (ref 150–450)
POTASSIUM SERPL-SCNC: 3.1 MMOL/L (ref 3.5–5.1)
PROCALCITONIN SERPL-MCNC: <0.05 NG/ML (ref ?–0.16)
PROT SERPL-MCNC: 8.4 G/DL (ref 6.4–8.2)
PROT UR STRIP.AUTO-MCNC: 20 MG/DL
PROTHROMBIN TIME: 26.6 SECONDS (ref 11.6–14.8)
RBC # BLD AUTO: 4.42 X10(6)UL
RBC UR QL AUTO: NEGATIVE
RHINOVIRUS/ENTERO PCR:: NOT DETECTED
RSV RNA SPEC QL NAA+PROBE: NOT DETECTED
SARS-COV-2 RNA NPH QL NAA+NON-PROBE: NOT DETECTED
SODIUM SERPL-SCNC: 136 MMOL/L (ref 136–145)
SP GR UR STRIP.AUTO: 1.02 (ref 1–1.03)
TROPONIN I SERPL HS-MCNC: <3 NG/L
UROBILINOGEN UR STRIP.AUTO-MCNC: NORMAL MG/DL
WBC # BLD AUTO: 9.3 X10(3) UL (ref 4–11)
WBC #/AREA URNS AUTO: >50 /HPF

## 2024-04-29 PROCEDURE — 93005 ELECTROCARDIOGRAM TRACING: CPT

## 2024-04-29 PROCEDURE — 87086 URINE CULTURE/COLONY COUNT: CPT | Performed by: EMERGENCY MEDICINE

## 2024-04-29 PROCEDURE — 80053 COMPREHEN METABOLIC PANEL: CPT

## 2024-04-29 PROCEDURE — 85025 COMPLETE CBC W/AUTO DIFF WBC: CPT | Performed by: EMERGENCY MEDICINE

## 2024-04-29 PROCEDURE — 80053 COMPREHEN METABOLIC PANEL: CPT | Performed by: EMERGENCY MEDICINE

## 2024-04-29 PROCEDURE — 83735 ASSAY OF MAGNESIUM: CPT | Performed by: EMERGENCY MEDICINE

## 2024-04-29 PROCEDURE — 87040 BLOOD CULTURE FOR BACTERIA: CPT | Performed by: EMERGENCY MEDICINE

## 2024-04-29 PROCEDURE — 99291 CRITICAL CARE FIRST HOUR: CPT

## 2024-04-29 PROCEDURE — 81001 URINALYSIS AUTO W/SCOPE: CPT | Performed by: EMERGENCY MEDICINE

## 2024-04-29 PROCEDURE — 36415 COLL VENOUS BLD VENIPUNCTURE: CPT

## 2024-04-29 PROCEDURE — 94799 UNLISTED PULMONARY SVC/PX: CPT

## 2024-04-29 PROCEDURE — 83605 ASSAY OF LACTIC ACID: CPT | Performed by: EMERGENCY MEDICINE

## 2024-04-29 PROCEDURE — 96375 TX/PRO/DX INJ NEW DRUG ADDON: CPT

## 2024-04-29 PROCEDURE — 94640 AIRWAY INHALATION TREATMENT: CPT

## 2024-04-29 PROCEDURE — 94644 CONT INHLJ TX 1ST HOUR: CPT

## 2024-04-29 PROCEDURE — 84484 ASSAY OF TROPONIN QUANT: CPT

## 2024-04-29 PROCEDURE — 84484 ASSAY OF TROPONIN QUANT: CPT | Performed by: EMERGENCY MEDICINE

## 2024-04-29 PROCEDURE — 96366 THER/PROPH/DIAG IV INF ADDON: CPT

## 2024-04-29 PROCEDURE — 84145 PROCALCITONIN (PCT): CPT | Performed by: HOSPITALIST

## 2024-04-29 PROCEDURE — 83880 ASSAY OF NATRIURETIC PEPTIDE: CPT | Performed by: EMERGENCY MEDICINE

## 2024-04-29 PROCEDURE — 96365 THER/PROPH/DIAG IV INF INIT: CPT

## 2024-04-29 PROCEDURE — 71045 X-RAY EXAM CHEST 1 VIEW: CPT

## 2024-04-29 PROCEDURE — 85610 PROTHROMBIN TIME: CPT | Performed by: EMERGENCY MEDICINE

## 2024-04-29 PROCEDURE — 93010 ELECTROCARDIOGRAM REPORT: CPT

## 2024-04-29 PROCEDURE — 96367 TX/PROPH/DG ADDL SEQ IV INF: CPT

## 2024-04-29 PROCEDURE — 0202U NFCT DS 22 TRGT SARS-COV-2: CPT | Performed by: HOSPITALIST

## 2024-04-29 PROCEDURE — 83605 ASSAY OF LACTIC ACID: CPT

## 2024-04-29 RX ORDER — POLYETHYLENE GLYCOL 3350 17 G/17G
17 POWDER, FOR SOLUTION ORAL DAILY PRN
Status: DISCONTINUED | OUTPATIENT
Start: 2024-04-29 | End: 2024-05-01

## 2024-04-29 RX ORDER — BENZONATATE 100 MG/1
200 CAPSULE ORAL 3 TIMES DAILY PRN
Status: DISCONTINUED | OUTPATIENT
Start: 2024-04-29 | End: 2024-04-30

## 2024-04-29 RX ORDER — METHYLPREDNISOLONE SODIUM SUCCINATE 125 MG/2ML
125 INJECTION, POWDER, LYOPHILIZED, FOR SOLUTION INTRAMUSCULAR; INTRAVENOUS ONCE
Status: COMPLETED | OUTPATIENT
Start: 2024-04-29 | End: 2024-04-29

## 2024-04-29 RX ORDER — BISACODYL 10 MG
10 SUPPOSITORY, RECTAL RECTAL
Status: DISCONTINUED | OUTPATIENT
Start: 2024-04-29 | End: 2024-05-01

## 2024-04-29 RX ORDER — IPRATROPIUM BROMIDE AND ALBUTEROL SULFATE 2.5; .5 MG/3ML; MG/3ML
3 SOLUTION RESPIRATORY (INHALATION) EVERY 4 HOURS PRN
Status: DISCONTINUED | OUTPATIENT
Start: 2024-04-29 | End: 2024-05-01

## 2024-04-29 RX ORDER — AZITHROMYCIN 250 MG/1
500 TABLET, FILM COATED ORAL
Status: DISCONTINUED | OUTPATIENT
Start: 2024-04-29 | End: 2024-05-01

## 2024-04-29 RX ORDER — PREDNISONE 20 MG/1
60 TABLET ORAL
Status: DISCONTINUED | OUTPATIENT
Start: 2024-04-30 | End: 2024-05-01

## 2024-04-29 RX ORDER — ACETAMINOPHEN 500 MG
500 TABLET ORAL EVERY 4 HOURS PRN
Status: DISCONTINUED | OUTPATIENT
Start: 2024-04-29 | End: 2024-05-01

## 2024-04-29 RX ORDER — POTASSIUM CHLORIDE 20 MEQ/1
40 TABLET, EXTENDED RELEASE ORAL EVERY 4 HOURS
Status: COMPLETED | OUTPATIENT
Start: 2024-04-29 | End: 2024-04-30

## 2024-04-29 RX ORDER — LEVOFLOXACIN 5 MG/ML
750 INJECTION, SOLUTION INTRAVENOUS ONCE
Status: COMPLETED | OUTPATIENT
Start: 2024-04-29 | End: 2024-04-29

## 2024-04-29 RX ORDER — PROCHLORPERAZINE EDISYLATE 5 MG/ML
5 INJECTION INTRAMUSCULAR; INTRAVENOUS EVERY 8 HOURS PRN
Status: DISCONTINUED | OUTPATIENT
Start: 2024-04-29 | End: 2024-05-01

## 2024-04-29 RX ORDER — SENNOSIDES 8.6 MG
17.2 TABLET ORAL NIGHTLY PRN
Status: DISCONTINUED | OUTPATIENT
Start: 2024-04-29 | End: 2024-05-01

## 2024-04-29 RX ORDER — ONDANSETRON 2 MG/ML
4 INJECTION INTRAMUSCULAR; INTRAVENOUS EVERY 6 HOURS PRN
Status: DISCONTINUED | OUTPATIENT
Start: 2024-04-29 | End: 2024-05-01

## 2024-04-29 RX ORDER — MAGNESIUM SULFATE HEPTAHYDRATE 40 MG/ML
2 INJECTION, SOLUTION INTRAVENOUS ONCE
Status: COMPLETED | OUTPATIENT
Start: 2024-04-29 | End: 2024-04-29

## 2024-04-29 NOTE — ED QUICK NOTES
Orders for admission, patient is aware of plan and ready to go upstairs. Any questions, please call ED RN franco at extension [phone].     Patient Covid vaccination status: Fully vaccinated     COVID Test Ordered in ED: None    COVID Suspicion at Admission: N/A    Running Infusions:      Mental Status/LOC at time of transport: aox4    Other pertinent information:   CIWA score: N/A   NIH score:  N/A

## 2024-04-29 NOTE — ED PROVIDER NOTES
Patient Seen in: [facility] Emergency Department      History     Chief Complaint   Patient presents with    Chest Pain Angina    Difficulty Breathing    Fever     Stated Complaint: CHEST PAIN,COUGH , SHANELLE, FEVER, + FLU A 10 DAYS  AGO, HX OF CHRONIC PERICARDITIS    Subjective:   HPI    Patient with history of factor V Leiden, status post CVA, lifelong warfarin, also h/o ulcerative colitis, on Entyvio, COVID-19 in 2022 with resulting pericarditis and pericardial effusion, followed by [facility] cardiology as well as [facility] pericarditis specialist.  [facility] oncology notes mention Entyvio given on April 26.  Had cardiac MRI and echo in December with [facility] at which time no effusion was seen.  Patient recently developed viral symptoms, diagnosed with flu a 10 days ago, since then feels that she is \"choking on my own secretions\", coughing, wheezing, increasingly short of breath.     Objective:   Past Medical History:    Asthma (HCC)    Extrinsic    Blood disorder    Factor V Leiden    Closed fracture of middle or proximal phalanx or phalanges of hand    Log Date: 03/25/2013     Colitis    Ulcerative colitis    Endometriosis    Esophageal reflux    Expressive aphasia    Well compensated unless tired    History of 2019 novel coronavirus disease (COVID-19)    Severe Headache, severe body aches, significant flare of ulcerative colitis. Not hospitalized.    History of immunocompromised state    Hypokalemia    Hypomagnesemia    Migraines    Pericarditis (HCC)    Reactive arthritis (HCC)    Stroke (HCC)    Expressive aphasia    Visual impairment    Wears Glaases or Contacts              Past Surgical History:   Procedure Laterality Date    Cholecystectomy  07/03/2020    Colonoscopy      2013    Colonoscopy N/A 11/27/2019    Procedure: COLONOSCOPY;  Surgeon: Qamar Hicks MD;  Location:  ENDOSCOPY    Dilation/curettage,diagnostic      2009    Egd N/A 03/09/2021    Procedure: ESOPHAGOGASTRODUODENOSCOPY,  COLONOSCOPY, POSSIBLE BIOPSY, POSSIBLE POLYPECTOMY 10088, 08844;  Surgeon: Qamar Hicks MD;  [facility]    Endometrial ablation      Laparoscopy,diagnostic      Cherelle localization wire 1 site right (cpt=19281) Left 2010,     breast abcess    Other      laproscopic for endometriosis    Thyroidectomy                  Social History     Socioeconomic History    Marital status:    Tobacco Use    Smoking status: Former     Current packs/day: 0.00     Types: Cigarettes     Quit date: 2000     Years since quittin.8    Smokeless tobacco: Never   Vaping Use    Vaping status: Never Used   Substance and Sexual Activity    Alcohol use: Yes     Comment: Very occasionally    Drug use: No     Social Determinants of Health     Food Insecurity: No Food Insecurity (2024)    Food Insecurity     Food Insecurity: Never true   Transportation Needs: No Transportation Needs (2024)    Transportation Needs     Lack of Transportation: No   Housing Stability: Low Risk  (2024)    Housing Stability     Housing Instability: No              Review of Systems    Positive for stated complaint: CHEST PAIN,COUGH , SHANELLE, FEVER, + FLU A 10 DAYS  AGO, HX OF CHRONIC PERICARDITIS  Other systems are as noted in HPI.  Constitutional and vital signs reviewed.      All other systems reviewed and negative except as noted above.    Physical Exam     ED Triage Vitals   BP 24 1321 111/73   Pulse 24 1321 110   Resp 24 1321 20   Temp 24 1320 99.6 °F (37.6 °C)   Temp src 24 1320 Oral   SpO2 24 1321 97 %   O2 Device 24 1321 None (Room air)       Current:/71 (BP Location: Right arm)   Pulse 106   Temp 97.7 °F (36.5 °C) (Temporal)   Resp 20   Ht 167.6 cm (5' 6\")   Wt 72 kg   LMP 2024 (Approximate)   SpO2 98%   BMI 25.62 kg/m²         Physical Exam    General: Well-developed, thin, tired appearing  head and neck: Normocephalic,  atraumatic  Cardiovascular: Regular rate and rhythm, normal S1 and S2, no obvious murmurs, rubs, or gallops   Lungs: Diffuse expiratory wheezing, Easily triggered cough reflex; no crackles heard  Abdomen: Positive bowel sounds,  soft, no tenderness, no distension, no rebound, no guarding   Extremities: No cyanosis, no clubbing, no edema   Musculoskeletal: No tenderness, swelling, deformity   Skin: No significant lesions   Neuro: Grossly intact to patient's baseline    ED Course     Labs Reviewed   COMP METABOLIC PANEL (14) - Abnormal; Notable for the following components:       Result Value    Potassium 3.1 (*)     BUN 6 (*)     Total Protein 8.4 (*)     Albumin 3.2 (*)     Globulin  5.2 (*)     A/G Ratio 0.6 (*)     All other components within normal limits   URINALYSIS WITH CULTURE REFLEX - Abnormal; Notable for the following components:    Ketones Urine 10 (*)     Protein Urine 20 (*)     Leukocyte Esterase Urine 500 (*)     WBC Urine >50 (*)     Bacteria Urine Rare (*)     Squamous Epi. Cells Few (*)     All other components within normal limits   PROTHROMBIN TIME (PT) - Abnormal; Notable for the following components:    PT 26.6 (*)     INR 2.42 (*)     All other components within normal limits   CBC W/ DIFFERENTIAL - Abnormal; Notable for the following components:    Monocyte Absolute 1.21 (*)     All other components within normal limits   LACTIC ACID, PLASMA - Normal   TROPONIN I HIGH SENSITIVITY - Normal   PRO BETA NATRIURETIC PEPTIDE - Normal   MAGNESIUM - Normal   PROCALCITONIN - Normal   CBC WITH DIFFERENTIAL WITH PLATELET    Narrative:     The following orders were created for panel order CBC With Differential With Platelet.  Procedure                               Abnormality         Status                     ---------                               -----------         ------                     CBC W/ DIFFERENTIAL[[id number]]          Abnormal            Final result                 Please view results  for these tests on the individual orders.   BLOOD CULTURE   BLOOD CULTURE   URINE CULTURE, ROUTINE   RESPIRATORY FLU EXPAND PANEL + COVID-19     EKG    Rate, intervals and axes as noted on EKG Report.  Rate: 103  Rhythm: Sinus Rhythm  Reading: No acute process; no significant change from June 2023         Differential diagnosis includes pneumonia, sepsis, bronchospasm, pericarditis, pericardial effusion, among other processes  A total of 35 minutes of critical care time (exclusive of billable procedures) was administered to manage the patient's respiratory instability due to her asthma.  This involved direct patient intervention, complex decision making, and/or extensive discussions with the patient, family, and clinical staff.         Bedside POCUS does not show significant pericardial effusion      Cxr image reviewed by myself shows no acute infiltrate no significant cardiomegaly, does show peribronchial thickening.  Radiology read concurs, mentions mild hyperinflation consistent with bronchitis or asthma         MDM      43-year-old history of a pericardial effusion, pericarditis following COVID in the past, history of factor V Leiden, status post CVA, on lifelong warfarin, history of ulcerative colitis on Entyvio, last infusion 3 days ago, diagnosed with influenza A 10 days ago, worsening shortness of breath, wheezing, pulmonary secretions, using an inhaler but without an AeroChamber  Admission disposition: 4/29/2024  4:28 PM       Patient was given hour-long nebulizer with Atrovent and albuterol, is moving slightly more air but still feels very winded and exhausted.  Still has significant wheezing on reexam.  She agrees to Solu-Medrol, magnesium, and we will start another hour-long neb shortly.  Patient has greater than 50 white blood cells, positive leukocytes, but negative nitrites.  Lactic acid is negative.  However given immunocompromise status we will go ahead with IV antibiotics.  Patient will be admitted  for further nebulizer treatments, supportive care, close observation.  Case discussed with Dr. Koo.  [facility] pulmonary on page.                                 Medical Decision Making      Disposition and Plan     Clinical Impression:  1. Moderate persistent asthma with exacerbation (HCC)    2. History of immunocompromised state         Disposition:  Admit  4/29/2024  4:28 pm    Follow-up:  No follow-up provider specified.        Medications Prescribed:  Current Discharge Medication List                            Hospital Problems       Present on Admission  Date Reviewed: 4/4/2022            ICD-10-CM Noted POA    * (Principal) Moderate persistent asthma with exacerbation (HCC) J45.41 4/29/2024 Unknown    History of immunocompromised state Z86.2 4/29/2024 Unknown

## 2024-04-29 NOTE — H&P
[facility] Hospitalist H&P       CC:   Chief Complaint   Patient presents with    Chest Pain Angina    Difficulty Breathing    Fever        PCP: DOUGIE Smiley    History of Present Illness:      Patient is a 43-year-old female significant past medical history of recent abnormal Pap smears status post cervical cold knife conization, factor V Leiden on Coumadin, ulcerative colitis on Entyvio, extrinsic asthma, prior history of stroke prior history of pleural effusion presented with shortness of breath as well as fevers.  Patient stated that she started first developing symptoms on 4/17/2024, she had fevers and chills on 4/19/2024 with cold-like symptoms including runny nose, cough, she then found out that somebody at work had tested positive for influenza and she tested herself positive about a week later and was positive for influenza.  She was feeling better however subsequently got significantly worse over the last weekend where she had fevers shortness of breath cough with productive sputum.  She came in the hospital as she feels extremely miserable.  She also had her Entyvio infusion yesterday which wiped her out even more.      In the emergency room patient received an nebulizer treatment, became tachycardic, blood pressure otherwise was stable and was saturating well on room air  CBC was unremarkable, BMP was also unremarkable INR was 2.42 chest x-ray showed peribronchial thickening EKG shows sinus tachycardia      PMH  Past Medical History:    Asthma (HCC)    Extrinsic    Blood disorder    Factor V Leiden    Closed fracture of middle or proximal phalanx or phalanges of hand    Log Date: 03/25/2013     Colitis    Ulcerative colitis    Endometriosis    Esophageal reflux    Expressive aphasia    Well compensated unless tired    History of 2019 novel coronavirus disease (COVID-19)    Severe Headache, severe body aches, significant flare of ulcerative colitis. Not hospitalized.    History of immunocompromised  state    Hypokalemia    Hypomagnesemia    Migraines    Pericarditis (HCC)    Reactive arthritis (HCC)    Stroke (HCC)    Expressive aphasia    Visual impairment    Wears Glaases or Contacts        PSH  Past Surgical History:   Procedure Laterality Date    Cholecystectomy  07/03/2020    Colonoscopy      2013    Colonoscopy N/A 11/27/2019    Procedure: COLONOSCOPY;  Surgeon: Qamar Hicks MD;  Location:  ENDOSCOPY    Dilation/curettage,diagnostic      2009    Egd N/A 03/09/2021    Procedure: ESOPHAGOGASTRODUODENOSCOPY, COLONOSCOPY, POSSIBLE BIOPSY, POSSIBLE POLYPECTOMY 94342, 72381;  Surgeon: Qamar Hicks MD;  [facility]    Endometrial ablation      Laparoscopy,diagnostic      Cherelle localization wire 1 site right (cpt=19281) Left 9/2010, 2/11    breast abcess    Other  1998    laproscopic for endometriosis    Thyroidectomy          ALL:  Allergies   Allergen Reactions    Cephalexin HIVES    Ibuprofen HIVES    Augmentin [Amoxicillin-Pot Clavulanate] NAUSEA AND VOMITING    Azathioprine OTHER (SEE COMMENTS)     Hepatotoxicity    Clindamycin HIVES    Morphine HIVES and SWELLING    Sulfa Antibiotics HIVES        Home Medications:  Outpatient Medications Marked as Taking for the 4/29/24 encounter (Hospital Encounter)   Medication Sig Dispense Refill    HYDROcodone-acetaminophen 5-325 MG Oral Tab Take 1 tablet by mouth every 4 (four) hours as needed for Pain. 10 tablet 0    warfarin 7.5 MG Oral Tab Take 1 tablet (7.5 mg total) by mouth nightly. Sunday and Tuesday 7.5mg  10mg on other days      colchicine 0.6 MG Oral Tab Take 1 tablet (0.6 mg total) by mouth daily.      Multiple Vitamin Oral Tab Take 1 tablet by mouth daily.      HYDROcodone-acetaminophen 5-325 MG Oral Tab Take 1 tablet by mouth every 4 (four) hours as needed. 20 tablet 0    Vedolizumab (ENTYVIO IV) Inject into the vein.      Albuterol Sulfate  (90 Base) MCG/ACT Inhalation Aero Soln Inhale 2 puffs into the lungs every  4 (four) hours as needed for Wheezing.      Albuterol Sulfate (VENTOLIN) (2.5 MG/3ML) 0.083% Inhalation Nebu Soln Take 3 mL (2.5 mg total) by nebulization every 4 (four) hours as needed for Wheezing or Shortness of Breath.           Soc Hx  Social History     Tobacco Use    Smoking status: Former     Current packs/day: 0.00     Types: Cigarettes     Quit date: 2000     Years since quittin.8    Smokeless tobacco: Never   Substance Use Topics    Alcohol use: Yes     Comment: Very occasionally        Fam Hx  Family History   Problem Relation Age of Onset    Breast Cancer Paternal Grandmother 40    Cancer Paternal Grandmother     Lipids Mother         Hyperlipidemia    Other (Sarcoidosis) Mother     Arthritis Other         Rheumatiod, sibling, family history of    Diabetes Other         sibling, family history of     Colon Cancer Maternal Grandmother     Diabetes Maternal Grandmother     Heart Attack Maternal Grandmother     Heart Surgery Maternal Grandmother     Cancer Maternal Grandmother     Heart Disorder Maternal Grandmother     Other (Liver Cancer) Maternal Grandmother     Heart Attack Maternal Grandfather     Heart Disorder Maternal Grandfather     Colon Cancer Paternal Grandfather     Diabetes Paternal Grandfather     Cancer Paternal Grandfather     Diabetes Sister        Review of Systems  General: Denies unintentional weight loss, fevers, or chills negative except for above  HEENT: Denies vision loss or double vision, denies hearing loss  Cardiovascular: Denies chest pain, palpitations, peripheral edema  Pulmonary: Denies cough, shortness of breath, or wheezing  Gastrointestinal: Denies abdominal pain, melena, or hematochezia  Genitourinary: Denies urinary frequency, urgency, and dysuria  Neurologic: Denies numbness, headaches, focal weakness  Skin: Denies rashes, sores  Endocrine: Denies heat or cold intolerance, denies polydipsia  Hematologic: Denies abnormal bleeding or bruising     OBJECTIVE:  BP  106/76   Pulse 107   Temp 99.6 °F (37.6 °C) (Oral)   Resp 23   Ht 5' 6\" (1.676 m)   Wt 158 lb 11.7 oz (72 kg)   LMP 04/16/2024 (Approximate)   SpO2 99%   BMI 25.62 kg/m²     BP Readings from Last 3 Encounters:   04/29/24 106/76   04/04/24 119/75   06/02/23 120/84     Wt Readings from Last 3 Encounters:   04/29/24 158 lb 11.7 oz (72 kg)   04/04/24 165 lb (74.8 kg)   04/25/23 163 lb 12.8 oz (74.3 kg)       Wt Readings from Last 6 Encounters:   04/29/24 158 lb 11.7 oz (72 kg)   04/04/24 165 lb (74.8 kg)   04/25/23 163 lb 12.8 oz (74.3 kg)   03/15/22 188 lb 0.8 oz (85.3 kg)   02/23/22 189 lb 2 oz (85.8 kg)   06/29/21 192 lb (87.1 kg)     Gen: No acute distress, alert and oriented x 3  Pulm: Lungs clear bilaterally, good inspiratory effort no wheezing-mild rhonchi noted  CV:  nL S1/S2  Abd: soft, NT/ND, no hepatomegaly, +BS  MSK: moving all extremities, no edema  Neuro: no focal deficits  Skin: no rashes/lesions  Psych: normal mood/affect          Diagnostic Data:    CBC/Chem  Recent Labs   Lab 04/29/24  1334 04/29/24  1437   WBC 9.3  --    HGB 12.9  --    MCV 86.4  --    .0  --    INR  --  2.42*       Recent Labs   Lab 04/29/24  1334      K 3.1*      CO2 23.0   BUN 6*   CREATSERUM 0.67   GLU 92   CA 8.9   MG 2.3       Recent Labs   Lab 04/29/24  1334   ALT 23   AST 18   ALB 3.2*       No results for input(s): \"TROP\" in the last 168 hours.        Radiology: XR CHEST AP PORTABLE  (CPT=71045)    Result Date: 4/29/2024  PROCEDURE:  XR CHEST AP PORTABLE  (CPT=71045)  TECHNIQUE:  AP chest radiograph was obtained.  COMPARISON:  [facility] , XR, XR CHEST AP PORTABLE  (CPT=71045), 6/02/2023, 5:53 PM.  INDICATIONS:  CHEST PAIN,COUGH , SHANELLE, FEVER, + FLU A 10 DAYS  AGO, HX OF CHRONIC PERICARDITIS  PATIENT STATED HISTORY: (As transcribed by Technologist)  Patient stated having a cough for the past couple of days.    FINDINGS:  Normal heart size and pulmonary vascularity. No pleural effusion or pneumothorax. No  lobar consolidation.  Surgical clips in the upper abdomen. Peribronchial thickening with mild hyperinflation could be related to bronchitis and/or asthma. Clinical correlation recommended.            CONCLUSION:  Peribronchial thickening with mild hyperinflation could be related to bronchitis and/or asthma. Clinical correlation recommended.   LOCATION:  [facility]      Dictated by (CST): Jose Flowers MD on 4/29/2024 at 1:44 PM     Finalized by (CST): Jose Flowers MD on 4/29/2024 at 1:45 PM              ASSESSMENT / PLAN:         Patient is a 43-year-old female significant past medical history of recent abnormal Pap smears status post cervical cold knife conization, factor V Leiden on Coumadin, ulcerative colitis on Entyvio, extrinsic asthma, prior history of stroke prior history of pleural effusion presented with shortness of breath as well as fevers    # Shortness of breath secondary to acute asthma exacerbation  # Upper respiratory infection bronchitis-  # Sinus tachycardia  -patient with prolonged course of upper respiratory infection now leading to fevers and shortness of breath  -Will start empiric azithromycin, DuoNebs, prednisone  -Respiratory viral panel   -Check procalcitonin  -Pulm consult  -Chest x-ray completed and reviewed  -Tachycardia is likely induced by DuoNebs      # History of factor V Leiden  -Continue Coumadin,    # History of stroke    # UC  -Patient got her infusion yesterday, continue to monitor    # History of pericarditis  -No chest pain or no other symptoms of bradycardia today's at this time      Prophy:  DVT: Coumadin  Deconditioning prevention: PT OT    Dispo: admit to [facility] with telemetry    Outpatient records reviewed confirming patient's medical history and medications.     Further recommendations pending patient's clinical course.  [facility] hospitalist to continue to follow patient while in house    Time spent: greater than 95 minutes spent in d/w pt/family, coordination of care, and  d/w staff.     Neo sotelo MD   Internal Medicine  [facility] Hospitalist  Pager: [phone]

## 2024-04-29 NOTE — ED QUICK NOTES
Spoke with pharmacy Jaron about compatibility of Levaqiun and Magnesium, states can't run them together

## 2024-04-30 LAB
ANION GAP SERPL CALC-SCNC: 5 MMOL/L (ref 0–18)
ATRIAL RATE: 103 BPM
BASOPHILS # BLD AUTO: 0 X10(3) UL (ref 0–0.2)
BASOPHILS NFR BLD AUTO: 0 %
BUN BLD-MCNC: 8 MG/DL (ref 9–23)
CALCIUM BLD-MCNC: 9.2 MG/DL (ref 8.5–10.1)
CHLORIDE SERPL-SCNC: 114 MMOL/L (ref 98–112)
CO2 SERPL-SCNC: 20 MMOL/L (ref 21–32)
CREAT BLD-MCNC: 0.69 MG/DL
EGFRCR SERPLBLD CKD-EPI 2021: 110 ML/MIN/1.73M2 (ref 60–?)
EOSINOPHIL # BLD AUTO: 0 X10(3) UL (ref 0–0.7)
EOSINOPHIL NFR BLD AUTO: 0 %
ERYTHROCYTE [DISTWIDTH] IN BLOOD BY AUTOMATED COUNT: 12.1 %
GLUCOSE BLD-MCNC: 138 MG/DL (ref 70–99)
HCT VFR BLD AUTO: 36.6 %
HGB BLD-MCNC: 13 G/DL
IMM GRANULOCYTES # BLD AUTO: 0.03 X10(3) UL (ref 0–1)
IMM GRANULOCYTES NFR BLD: 0.4 %
INR BLD: 3 (ref 0.8–1.2)
LYMPHOCYTES # BLD AUTO: 0.51 X10(3) UL (ref 1–4)
LYMPHOCYTES NFR BLD AUTO: 7.2 %
MCH RBC QN AUTO: 30 PG (ref 26–34)
MCHC RBC AUTO-ENTMCNC: 35.5 G/DL (ref 31–37)
MCV RBC AUTO: 84.5 FL
MONOCYTES # BLD AUTO: 0.34 X10(3) UL (ref 0.1–1)
MONOCYTES NFR BLD AUTO: 4.8 %
NEUTROPHILS # BLD AUTO: 6.23 X10 (3) UL (ref 1.5–7.7)
NEUTROPHILS # BLD AUTO: 6.23 X10(3) UL (ref 1.5–7.7)
NEUTROPHILS NFR BLD AUTO: 87.6 %
OSMOLALITY SERPL CALC.SUM OF ELEC: 289 MOSM/KG (ref 275–295)
P AXIS: 43 DEGREES
P-R INTERVAL: 130 MS
PLATELET # BLD AUTO: 355 10(3)UL (ref 150–450)
POTASSIUM SERPL-SCNC: 4.6 MMOL/L (ref 3.5–5.1)
POTASSIUM SERPL-SCNC: 4.6 MMOL/L (ref 3.5–5.1)
PROTHROMBIN TIME: 31.6 SECONDS (ref 11.6–14.8)
Q-T INTERVAL: 356 MS
QRS DURATION: 72 MS
QTC CALCULATION (BEZET): 466 MS
R AXIS: 34 DEGREES
RBC # BLD AUTO: 4.33 X10(6)UL
SODIUM SERPL-SCNC: 139 MMOL/L (ref 136–145)
T AXIS: 31 DEGREES
VENTRICULAR RATE: 103 BPM
WBC # BLD AUTO: 7.1 X10(3) UL (ref 4–11)

## 2024-04-30 PROCEDURE — 94799 UNLISTED PULMONARY SVC/PX: CPT

## 2024-04-30 PROCEDURE — 85025 COMPLETE CBC W/AUTO DIFF WBC: CPT | Performed by: HOSPITALIST

## 2024-04-30 PROCEDURE — 84132 ASSAY OF SERUM POTASSIUM: CPT | Performed by: HOSPITALIST

## 2024-04-30 PROCEDURE — 85610 PROTHROMBIN TIME: CPT | Performed by: HOSPITALIST

## 2024-04-30 PROCEDURE — 80048 BASIC METABOLIC PNL TOTAL CA: CPT | Performed by: HOSPITALIST

## 2024-04-30 PROCEDURE — 94640 AIRWAY INHALATION TREATMENT: CPT

## 2024-04-30 RX ORDER — BENZONATATE 100 MG/1
200 CAPSULE ORAL 3 TIMES DAILY
Status: DISCONTINUED | OUTPATIENT
Start: 2024-04-30 | End: 2024-05-01

## 2024-04-30 RX ORDER — CODEINE PHOSPHATE AND GUAIFENESIN 10; 100 MG/5ML; MG/5ML
5 SOLUTION ORAL EVERY 4 HOURS PRN
Status: DISCONTINUED | OUTPATIENT
Start: 2024-04-30 | End: 2024-05-01

## 2024-04-30 RX ORDER — FLUTICASONE FUROATE AND VILANTEROL 200; 25 UG/1; UG/1
1 POWDER RESPIRATORY (INHALATION) DAILY
Status: DISCONTINUED | OUTPATIENT
Start: 2024-04-30 | End: 2024-05-01

## 2024-04-30 NOTE — PLAN OF CARE
Assumed patient care at 1930.  AxOx4  RA, VSS, NSR/ST (90s-110s) on tele  Strong, dry, non productive cough  PRN cough medicine given per MAR  RAC PIV saline locked.  PO zithromax q24  Pulmonology to see  Regular diet  Cardiac electrolyte replacement protocol  K (3.1) - replaced, redraw pending.  Bed in lowest position  Call light within reach.   Needs met at this time.    Problem: Patient/Family Goals  Goal: Patient/Family Long Term Goal  Description: Patient's Long Term Goal: resolve chest pain and cough    Interventions:  - duonebs  - PRN cough medicine  - pulm consult  - See additional Care Plan goals for specific interventions  Outcome: Progressing  Goal: Patient/Family Short Term Goal  Description: Patient's Short Term Goal: sleep    Interventions:   - cluster care when appropriate  - provide a sleep/rest environment  - See additional Care Plan goals for specific interventions  Outcome: Progressing

## 2024-04-30 NOTE — PLAN OF CARE
NURSING ADMISSION NOTE      Patient admitted via Cart  Oriented to room.  Safety precautions initiated.  Bed in low position.  Call light in reach.    ADMISSION NAVIAGTOR COMPLETED, VITAL SIGNS MEASURED, HEAD- TO-TOE ASSESSMENT DOCUMENTED.     SAFETY PRECAUTIONS ARE IN PLACE

## 2024-04-30 NOTE — PLAN OF CARE
Assumed care at 0730  A/ox4, RA, VSS  Tele, NSR  Reg diet  Scheduled tessalon   PRN guaifenesin/codeine   Cardiac replacement   Updated w/ POC  All needs met at this time     Problem: RESPIRATORY - ADULT  Goal: Achieves optimal ventilation and oxygenation  Description: INTERVENTIONS:  - Assess for changes in respiratory status  - Assess for changes in mentation and behavior  - Position to facilitate oxygenation and minimize respiratory effort  - Oxygen supplementation based on oxygen saturation or ABGs  - Provide Smoking Cessation handout, if applicable  - Encourage broncho-pulmonary hygiene including cough, deep breathe, Incentive Spirometry  - Assess the need for suctioning and perform as needed  - Assess and instruct to report SOB or any respiratory difficulty  - Respiratory Therapy support as indicated  - Manage/alleviate anxiety  - Monitor for signs/symptoms of CO2 retention  Outcome: Progressing     Problem: Patient/Family Goals  Goal: Patient/Family Long Term Goal  Description: Patient's Long Term Goal: resolve chest pain and cough    Interventions:  - duonebs  - PRN cough medicine  - pulm consult  - See additional Care Plan goals for specific interventions  Outcome: Progressing  Goal: Patient/Family Short Term Goal  Description: Patient's Short Term Goal: sleep    Interventions:   - cluster care when appropriate  - provide a sleep/rest environment  - See additional Care Plan goals for specific interventions  Outcome: Progressing

## 2024-04-30 NOTE — CONSULTS
Pulmonary H&P/Consult       NAME: Blanche Rogel - ROOM: [address] [place], [place] [address]A - MRN: [id number] - Age: 43 year old - :  1980    Date of Admission: 2024  1:59 PM  Admission Diagnosis: Moderate persistent asthma with exacerbation (HCC) [J45.41]  History of immunocompromised state [Z86.2]  Reason for consult: asthma exacerbation    History of Present Illness: 44 y/o w/ h/o asthma, chronic pericarditis who presented to [facility] w/ c/o cough and SOB.  Pt contracted Flu A about 2 weeks ago and has noted increased coughing and SOB since that time.  She feels congested with mucus and has trouble laying flat for too long as she feels like her chest fills up with mucus that she has to then cough out.  She has tried OTC meds as well as her albuterol inhaler with minimal relief.      Past Medical History:    Asthma (HCC)    Extrinsic    Blood disorder    Factor V Leiden    Closed fracture of middle or proximal phalanx or phalanges of hand    Log Date: 2013     Colitis    Ulcerative colitis    Endometriosis    Esophageal reflux    Expressive aphasia    Well compensated unless tired    History of 2019 novel coronavirus disease (COVID-19)    Severe Headache, severe body aches, significant flare of ulcerative colitis. Not hospitalized.    History of immunocompromised state    Hypokalemia    Hypomagnesemia    Migraines    Pericarditis (HCC)    Reactive arthritis (HCC)    Stroke (HCC)    Expressive aphasia    Visual impairment    Wears Glaases or Contacts      Past Surgical History:   Procedure Laterality Date    Cholecystectomy  2020    Colonoscopy          Colonoscopy N/A 2019    Procedure: COLONOSCOPY;  Surgeon: Qamar Hicks MD;  Location:  ENDOSCOPY    Dilation/curettage,diagnostic          Egd N/A 2021    Procedure: ESOPHAGOGASTRODUODENOSCOPY, COLONOSCOPY, POSSIBLE BIOPSY, POSSIBLE POLYPECTOMY 69563, [id number];  Surgeon: Qamar Hicks MD;  [facility]    Endometrial  ablation      Laparoscopy,diagnostic      Cherelle localization wire 1 site right (cpt=19281) Left 2010,     breast abcess    Other      laproscopic for endometriosis    Thyroidectomy        Medications Prior to Admission   Medication Sig Dispense Refill Last Dose    NON FORMULARY B 12 injections       HYDROcodone-acetaminophen 5-325 MG Oral Tab Take 1 tablet by mouth every 4 (four) hours as needed for Pain. 10 tablet 0 Taking    warfarin 7.5 MG Oral Tab Take 1 tablet (7.5 mg total) by mouth nightly.  and Tuesday 7.5mg  10mg on other days   2024    colchicine 0.6 MG Oral Tab Take 1 tablet (0.6 mg total) by mouth daily.   Past Month    Multiple Vitamin Oral Tab Take 1 tablet by mouth daily.   2024    HYDROcodone-acetaminophen 5-325 MG Oral Tab Take 1 tablet by mouth every 4 (four) hours as needed. 20 tablet 0 Taking    Vedolizumab (ENTYVIO IV) Inject into the vein.   2024    Albuterol Sulfate  (90 Base) MCG/ACT Inhalation Aero Soln Inhale 2 puffs into the lungs every 4 (four) hours as needed for Wheezing.   2024    Albuterol Sulfate (VENTOLIN) (2.5 MG/3ML) 0.083% Inhalation Nebu Soln Take 3 mL (2.5 mg total) by nebulization every 4 (four) hours as needed for Wheezing or Shortness of Breath.   2024     Allergies   Allergen Reactions    Cephalexin HIVES    Ibuprofen HIVES    Augmentin [Amoxicillin-Pot Clavulanate] NAUSEA AND VOMITING    Azathioprine OTHER (SEE COMMENTS)     Hepatotoxicity    Clindamycin HIVES    Morphine HIVES and SWELLING    Sulfa Antibiotics HIVES       Social History:  Social History     Socioeconomic History    Marital status:      Spouse name: Not on file    Number of children: Not on file    Years of education: Not on file    Highest education level: Not on file   Occupational History    Not on file   Tobacco Use    Smoking status: Former     Current packs/day: 0.00     Types: Cigarettes     Quit date: 2000     Years since quittin.8     Smokeless tobacco: Never   Vaping Use    Vaping status: Never Used   Substance and Sexual Activity    Alcohol use: Yes     Comment: Very occasionally    Drug use: No    Sexual activity: Not on file   Other Topics Concern    Not on file   Social History Narrative    Not on file     Social Determinants of Health     Financial Resource Strain: Not on file   Food Insecurity: No Food Insecurity (4/29/2024)    Food Insecurity     Food Insecurity: Never true   Transportation Needs: No Transportation Needs (4/29/2024)    Transportation Needs     Lack of Transportation: No   Physical Activity: Not on file   Stress: Not on file   Social Connections: Not on file   Housing Stability: Low Risk  (4/29/2024)    Housing Stability     Housing Instability: No     Housing Instability Emergency: Not on file          Family History:  Family History   Problem Relation Age of Onset    Breast Cancer Paternal Grandmother 40    Cancer Paternal Grandmother     Lipids Mother         Hyperlipidemia    Other (Sarcoidosis) Mother     Arthritis Other         Rheumatiod, sibling, family history of    Diabetes Other         sibling, family history of     Colon Cancer Maternal Grandmother     Diabetes Maternal Grandmother     Heart Attack Maternal Grandmother     Heart Surgery Maternal Grandmother     Cancer Maternal Grandmother     Heart Disorder Maternal Grandmother     Other (Liver Cancer) Maternal Grandmother     Heart Attack Maternal Grandfather     Heart Disorder Maternal Grandfather     Colon Cancer Paternal Grandfather     Diabetes Paternal Grandfather     Cancer Paternal Grandfather     Diabetes Sister         Home Medications:  Outpatient Medications Marked as Taking for the 4/29/24 encounter (Hospital Encounter)   Medication Sig Dispense Refill    NON FORMULARY B 12 injections      HYDROcodone-acetaminophen 5-325 MG Oral Tab Take 1 tablet by mouth every 4 (four) hours as needed for Pain. 10 tablet 0    warfarin 7.5 MG Oral Tab Take 1  tablet (7.5 mg total) by mouth nightly. Sunday and Tuesday 7.5mg  10mg on other days      colchicine 0.6 MG Oral Tab Take 1 tablet (0.6 mg total) by mouth daily.      Multiple Vitamin Oral Tab Take 1 tablet by mouth daily.      HYDROcodone-acetaminophen 5-325 MG Oral Tab Take 1 tablet by mouth every 4 (four) hours as needed. 20 tablet 0    Vedolizumab (ENTYVIO IV) Inject into the vein.      Albuterol Sulfate  (90 Base) MCG/ACT Inhalation Aero Soln Inhale 2 puffs into the lungs every 4 (four) hours as needed for Wheezing.      Albuterol Sulfate (VENTOLIN) (2.5 MG/3ML) 0.083% Inhalation Nebu Soln Take 3 mL (2.5 mg total) by nebulization every 4 (four) hours as needed for Wheezing or Shortness of Breath.         Scheduled Medication:   fluticasone furoate-vilanterol  1 puff Inhalation Daily    benzonatate  200 mg Oral TID    predniSONE  60 mg Oral Daily with breakfast    warfarin  7.5 mg Oral Nightly    azithromycin  500 mg Oral Daily     Continuous Infusing Medication:    PRN Medication:  guaiFENesin-codeine    acetaminophen    polyethylene glycol (PEG 3350)    sennosides    bisacodyl    ondansetron    prochlorperazine    guaiFENesin    ipratropium-albuterol     REVIEW OF SYSTEMS:   14 point ROS conducted, negative save for above       OBJECTIVE:  Vitals:    04/29/24 1900 04/29/24 1946 04/30/24 0030 04/30/24 0430   BP: 116/81 115/71 104/77 96/57   BP Location:  Right arm Right arm Right arm   Pulse:  106 92 100   Resp:  20 18 22   Temp:  97.7 °F (36.5 °C) 98 °F (36.7 °C) 97.8 °F (36.6 °C)   TempSrc:  Temporal Oral Temporal   SpO2:  98% 98% 97%   Weight:       Height:           Oxygen Therapy  SpO2: 97 %  O2 Device: None (Room air)  Pulse Oximetry Type: Continuous                  Wt Readings from Last 3 Encounters:   04/29/24 158 lb 11.7 oz (72 kg)   04/04/24 165 lb (74.8 kg)   04/25/23 163 lb 12.8 oz (74.3 kg)         Intake/Output Summary (Last 24 hours) at 4/30/2024 1144  Last data filed at 4/29/2024  1809  Gross per 24 hour   Intake 200 ml   Output --   Net 200 ml       BP 96/57 (BP Location: Right arm)   Pulse 100   Temp 97.8 °F (36.6 °C) (Temporal)   Resp 22   Ht 5' 6\" (1.676 m)   Wt 158 lb 11.7 oz (72 kg)   LMP 04/16/2024 (Approximate)   SpO2 97%   BMI 25.62 kg/m²     General Appearance:    Alert, cooperative, no distress, appears stated age   Head:    Normocephalic, without obvious abnormality, atraumatic   Eyes:    PERRL, conjunctiva/corneas clear, EOM's intact, both eyes   Throat:   Lips, mucosa, and tongue normal; teeth and gums normal   Neck:   Supple, symmetrical, trachea midline, no adenopathy;        thyroid:  No enlargement/tenderness/nodules; no carotid    bruit or JVD   Back:     Symmetric, no curvature, ROM normal, no CVA tenderness   Lungs:     Insp and exp wheezes cody, some referred from upper airway, respirations unlabored   Chest wall:    No tenderness or deformity   Heart:    Regular rate and rhythm, S1 and S2 normal, no murmur, rub   or gallop   Abdomen:     Soft, non-tender, bowel sounds active all four quadrants,     no masses, no organomegaly   Extremities:   Extremities normal, atraumatic, no cyanosis or edema   Pulses:   2+ and symmetric all extremities   Skin:   Skin color, texture, turgor normal, no rashes or lesions   Neurologic:   CNII-XII intact. Normal strength, sensation and reflexes       throughout       Labs reviewed as noted below    Imaging: Chest x-ray reviewed- clear lung fields cody    ASSESSMENT/PLAN:    Coughing/Wheezing  -due to asthma exacerbation related to flu A  -cough suppressants scheduled and PRN  Asthma Exacerbation  -cont steroids  -start Breo- plan on 1mth of ICS.LABA  -BD protocol                   Jcarlos Silvestre  [facility]  Pulmonary and Critical Care

## 2024-04-30 NOTE — PROGRESS NOTES
[facility]   part of [facility]     Hospitalist Progress Note     Blanche Rogel Patient Status:  Observation    1980 MRN [id number]   Location [facility] 3NE-A Attending Neo Koo MD   Hosp Day # 0 PCP DOUGIE Smiley     Chief Complaint: Shortness of breath and asthma exacerbation    Subjective:     Patient doing significantly better today than she did yesterday, still feels that she has a lot of phlegm and congestion in her chest, has been coughing and has had multiple coughing fits  Still does not feel comfortable enough to go home has been afebrile   Objective:    Review of Systems:   A comprehensive review of systems was completed; pertinent positive and negatives stated in subjective.    Vital signs:  Temp:  [97.7 °F (36.5 °C)-98 °F (36.7 °C)] 97.8 °F (36.6 °C)  Pulse:  [] 100  Resp:  [18-28] 22  BP: ()/(57-91) 96/57  SpO2:  [97 %-100 %] 97 %    Physical Exam:      Gen: No acute distress, alert and oriented x 3  Pulm: Lungs clear bilaterally, good inspiratory effort no wheezing-mild rhonchi noted  CV:  nL S1/S2  Abd: soft, NT/ND, no hepatomegaly, +BS  MSK: moving all extremities, no edema  Neuro: no focal deficits  Skin: no rashes/lesions  Psych: normal mood/affect    Diagnostic Data:    Labs:  Recent Labs   Lab 24  1334 24  1437 24  0621   WBC 9.3  --  7.1   HGB 12.9  --  13.0   MCV 86.4  --  84.5   .0  --  355.0   INR  --  2.42* 3.00*       Recent Labs   Lab 24  1334 24  0621   GLU 92 138*   BUN 6* 8*   CREATSERUM 0.67 0.69   CA 8.9 9.2   ALB 3.2*  --     139   K 3.1* 4.6  4.6    114*   CO2 23.0 20.0*   ALKPHO 74  --    AST 18  --    ALT 23  --    BILT 0.7  --    TP 8.4*  --        Estimated Creatinine Clearance: 97.4 mL/min (based on SCr of 0.69 mg/dL).    Recent Labs   Lab 24  1334   TROPHS <3       Recent Labs   Lab 24  1437 24  0621   PTP 26.6* 31.6*   INR 2.42* 3.00*                   Microbiology    Hospital Encounter on 04/29/24   1. Urine Culture, Routine     Status: None    Collection Time: 04/29/24  2:37 PM    Specimen: Urine, clean catch   Result Value Ref Range    Urine Culture  N/A     <10,000 cfu/ml Mixture of Gram positive organisms isolated - probable contamination.         Imaging: Reviewed in Epic.    Medications:    fluticasone furoate-vilanterol  1 puff Inhalation Daily    benzonatate  200 mg Oral TID    predniSONE  60 mg Oral Daily with breakfast    warfarin  7.5 mg Oral Nightly    azithromycin  500 mg Oral Daily       Assessment & Plan:      Patient is a 43-year-old female significant past medical history of recent abnormal Pap smears status post cervical cold knife conization, factor V Leiden on Coumadin, ulcerative colitis on Entyvio, extrinsic asthma, prior history of stroke prior history of pleural effusion presented with shortness of breath as well as fevers     # Shortness of breath secondary to acute asthma exacerbation  # Upper respiratory infection -acute bronchitis-  # Sinus tachycardia  -patient with prolonged course of upper respiratory infection now leading to fevers and shortness of breath  -Will start empiric azithromycin, DuoNebs, prednisone, continue this for now  -Respiratory viral panel -influenza positive  -Check procalcitonin-negative  -Pulm consult-appreciated, added Breo likely will need ICS/LABA for about a month  -Chest x-ray completed and reviewed  -Continue above care for another 24 hours, plan to discharge in a.m. if continues to feel better        # History of factor V Leiden  -Continue Coumadin,     # History of stroke     # UC  -Patient got her infusion yesterday, continue to monitor     # History of pericarditis  -No chest pain or no other symptoms of bradycardia today's at this time           Neo Koo MD    Supplementary Documentation:     Quality:  DVT Mechanical Prophylaxis:   SCDs, Early ambuation  DVT Pharmacologic  Prophylaxis   Medication    warfarin (Coumadin) tab 7.5 mg                Code Status: Full Code  Shukla: No urinary catheter in place  Shukla Duration (in days):   Central line:    AUGUST:     The 21st Century Cures Act makes medical notes like these available to patients in the interest of transparency. Please be advised this is a medical document. Medical documents are intended to carry relevant information, facts as evident, and the clinical opinion of the practitioner. The medical note is intended as peer to peer communication and may appear blunt or direct. It is written in medical language and may contain abbreviations or verbiage that are unfamiliar.

## 2024-05-01 VITALS
HEIGHT: 66 IN | TEMPERATURE: 98 F | SYSTOLIC BLOOD PRESSURE: 107 MMHG | OXYGEN SATURATION: 96 % | WEIGHT: 158.75 LBS | BODY MASS INDEX: 25.51 KG/M2 | HEART RATE: 83 BPM | DIASTOLIC BLOOD PRESSURE: 69 MMHG | RESPIRATION RATE: 20 BRPM

## 2024-05-01 LAB
ANION GAP SERPL CALC-SCNC: 5 MMOL/L (ref 0–18)
BASOPHILS # BLD AUTO: 0.01 X10(3) UL (ref 0–0.2)
BASOPHILS NFR BLD AUTO: 0.1 %
BUN BLD-MCNC: 11 MG/DL (ref 9–23)
CALCIUM BLD-MCNC: 8.9 MG/DL (ref 8.5–10.1)
CHLORIDE SERPL-SCNC: 115 MMOL/L (ref 98–112)
CO2 SERPL-SCNC: 21 MMOL/L (ref 21–32)
CREAT BLD-MCNC: 0.65 MG/DL
EGFRCR SERPLBLD CKD-EPI 2021: 111 ML/MIN/1.73M2 (ref 60–?)
EOSINOPHIL # BLD AUTO: 0.01 X10(3) UL (ref 0–0.7)
EOSINOPHIL NFR BLD AUTO: 0.1 %
ERYTHROCYTE [DISTWIDTH] IN BLOOD BY AUTOMATED COUNT: 12.4 %
GLUCOSE BLD-MCNC: 104 MG/DL (ref 70–99)
HCT VFR BLD AUTO: 33.8 %
HGB BLD-MCNC: 11 G/DL
IMM GRANULOCYTES # BLD AUTO: 0.08 X10(3) UL (ref 0–1)
IMM GRANULOCYTES NFR BLD: 0.7 %
INR BLD: 4.06 (ref 0.8–1.2)
LYMPHOCYTES # BLD AUTO: 1.73 X10(3) UL (ref 1–4)
LYMPHOCYTES NFR BLD AUTO: 15.7 %
MCH RBC QN AUTO: 29.1 PG (ref 26–34)
MCHC RBC AUTO-ENTMCNC: 32.5 G/DL (ref 31–37)
MCV RBC AUTO: 89.4 FL
MONOCYTES # BLD AUTO: 0.75 X10(3) UL (ref 0.1–1)
MONOCYTES NFR BLD AUTO: 6.8 %
NEUTROPHILS # BLD AUTO: 8.41 X10 (3) UL (ref 1.5–7.7)
NEUTROPHILS # BLD AUTO: 8.41 X10(3) UL (ref 1.5–7.7)
NEUTROPHILS NFR BLD AUTO: 76.6 %
OSMOLALITY SERPL CALC.SUM OF ELEC: 292 MOSM/KG (ref 275–295)
PLATELET # BLD AUTO: 375 10(3)UL (ref 150–450)
POTASSIUM SERPL-SCNC: 3.8 MMOL/L (ref 3.5–5.1)
PROTHROMBIN TIME: 40.2 SECONDS (ref 11.6–14.8)
RBC # BLD AUTO: 3.78 X10(6)UL
SODIUM SERPL-SCNC: 141 MMOL/L (ref 136–145)
WBC # BLD AUTO: 11 X10(3) UL (ref 4–11)

## 2024-05-01 PROCEDURE — 85610 PROTHROMBIN TIME: CPT | Performed by: HOSPITALIST

## 2024-05-01 PROCEDURE — 80048 BASIC METABOLIC PNL TOTAL CA: CPT | Performed by: HOSPITALIST

## 2024-05-01 PROCEDURE — 85025 COMPLETE CBC W/AUTO DIFF WBC: CPT | Performed by: HOSPITALIST

## 2024-05-01 RX ORDER — BENZONATATE 200 MG/1
200 CAPSULE ORAL 3 TIMES DAILY
Qty: 30 CAPSULE | Refills: 0 | Status: SHIPPED | OUTPATIENT
Start: 2024-05-01

## 2024-05-01 RX ORDER — CODEINE PHOSPHATE AND GUAIFENESIN 10; 100 MG/5ML; MG/5ML
5 SOLUTION ORAL EVERY 4 HOURS PRN
Qty: 150 ML | Refills: 0 | Status: SHIPPED | OUTPATIENT
Start: 2024-05-01

## 2024-05-01 RX ORDER — PREDNISONE 20 MG/1
TABLET ORAL
Qty: 20 TABLET | Refills: 0 | Status: SHIPPED | OUTPATIENT
Start: 2024-05-01

## 2024-05-01 RX ORDER — FLUTICASONE FUROATE AND VILANTEROL 200; 25 UG/1; UG/1
1 POWDER RESPIRATORY (INHALATION) DAILY
Qty: 1 EACH | Refills: 0 | Status: SHIPPED | OUTPATIENT
Start: 2024-05-01

## 2024-05-01 RX ORDER — POTASSIUM CHLORIDE 20 MEQ/1
40 TABLET, EXTENDED RELEASE ORAL ONCE
Status: COMPLETED | OUTPATIENT
Start: 2024-05-01 | End: 2024-05-01

## 2024-05-01 NOTE — DISCHARGE SUMMARY
General Medicine Discharge Summary     Patient ID:  Blanche Rogel  43 year old  7/30/1980    Admit date: 4/29/2024    Discharge date and time: 5/1/2024    Attending Physician: Neo Koo MD     Primary Care Physician: DOUGIE Smiley     Reason for admission: see HPI    Discharge Diagnoses: Moderate persistent asthma with exacerbation (HCC) [J45.41]  History of immunocompromised state [Z86.2]    Discharged Condition: good    Exam: (see progress notes for full details)  No acute distress, alert and oriented    Hospital Course:   Patient is a 43-year-old female significant past medical history of recent abnormal Pap smears status post cervical cold knife conization, factor V Leiden on Coumadin, ulcerative colitis on Entyvio, extrinsic asthma, prior history of stroke prior history of pleural effusion presented with shortness of breath as well as fevers.  Patient stated that she started first developing symptoms on 4/17/2024, she had fevers and chills on 4/19/2024 with cold-like symptoms including runny nose, cough, she then found out that somebody at work had tested positive for influenza and she tested herself positive about a week later and was positive for influenza.  She was feeling better however subsequently got significantly worse over the last weekend where she had fevers shortness of breath cough with productive sputum.  She came in the hospital as she feels extremely miserable.  She also had her Entyvio infusion yesterday which wiped her out even more.        In the emergency room patient received an nebulizer treatment, became tachycardic, blood pressure otherwise was stable and was saturating well on room air  CBC was unremarkable, BMP was also unremarkable INR was 2.42 chest x-ray showed peribronchial thickening EKG shows sinus tachycardia    # Shortness of breath secondary to acute asthma exacerbation  # Upper respiratory infection -acute bronchitis-  # Sinus tachycardia  -patient with  prolonged course of upper respiratory infection now leading to fevers and shortness of breath  -Will start empiric azithromycin, DuoNebs, prednisone, continue this for now  -Respiratory viral panel -influenza positive  -Check procalcitonin-negative  -Pulm consult-appreciated, added Milo likely will need ICS/LABA for about a month  -Chest x-ray completed and reviewed  Okay to discharge per pulmonology, patient feeling significantly better, discharged on steroid taper as well as ICS/LABA no need for antibiotics on discharge     # History of factor V Leiden  -Continue Coumadin,  -Coumadin dosage to be managed by pharmacy     # History of stroke     # UC  -Patient got her infusion yesterday, continue to monitor     # History of pericarditis  -No chest pain or no other symptoms of bradycardia today's at this time       Consults: IP CONSULT TO HOSPITALIST  IP CONSULT TO PULMONOLOGY    Operative Procedures:      Disposition: home    Patient Instructions:   Current Discharge Medication List        START taking these medications    Details   fluticasone furoate-vilanterol 200-25 MCG/ACT Inhalation Aerosol Powder, Breath Activated Inhale 1 puff into the lungs daily.      Spacer/Aero-Holding Chambers Does not apply Device Use as instructed      predniSONE 20 MG Oral Tab Take three tabs daily x3 days, then two tabs daily x3 days, then one tab daily x3 days, then 0.5 tabs daily x3 days      benzonatate 200 MG Oral Cap Take 1 capsule (200 mg total) by mouth in the morning, at noon, and at bedtime.      guaiFENesin-codeine 100-10 MG/5ML Oral Solution Take 5 mL by mouth every 4 (four) hours as needed for cough.           CONTINUE these medications which have NOT CHANGED    Details   NON FORMULARY B 12 injections      warfarin 7.5 MG Oral Tab Take 1 tablet (7.5 mg total) by mouth nightly. Sunday and Tuesday 7.5mg  10mg on other days      Multiple Vitamin Oral Tab Take 1 tablet by mouth daily.      Vedolizumab (ENTYVIO IV) Inject  into the vein.      Albuterol Sulfate  (90 Base) MCG/ACT Inhalation Aero Soln Inhale 2 puffs into the lungs every 4 (four) hours as needed for Wheezing.      Albuterol Sulfate (VENTOLIN) (2.5 MG/3ML) 0.083% Inhalation Nebu Soln Take 3 mL (2.5 mg total) by nebulization every 4 (four) hours as needed for Wheezing or Shortness of Breath.           STOP taking these medications       HYDROcodone-acetaminophen 5-325 MG Oral Tab        colchicine 0.6 MG Oral Tab        HYDROcodone-acetaminophen 5-325 MG Oral Tab                I reconciled current and discharge medications on day of discharge    Follow-up with PCP, pulm    No orders of the defined types were placed in this encounter.          Total Time Coordinating Care: Greater than 30 minutes    Patient had opportunity to ask questions and state understand and agree with therapeutic plan as outlined above.     Thank You,    Neo sotelo MD

## 2024-05-01 NOTE — PLAN OF CARE
Pt A&OX4  RA;VSS  Tele- NSR/ST  PO Prednisone   PO Zithromax   (+) Influenza   K replaced   PIV SL   Cardiac electrolyte protocol   Hopeful for DC   Staff will continue to monitor       Problem: RESPIRATORY - ADULT  Goal: Achieves optimal ventilation and oxygenation  Description: INTERVENTIONS:  - Assess for changes in respiratory status  - Assess for changes in mentation and behavior  - Position to facilitate oxygenation and minimize respiratory effort  - Oxygen supplementation based on oxygen saturation or ABGs  - Provide Smoking Cessation handout, if applicable  - Encourage broncho-pulmonary hygiene including cough, deep breathe, Incentive Spirometry  - Assess the need for suctioning and perform as needed  - Assess and instruct to report SOB or any respiratory difficulty  - Respiratory Therapy support as indicated  - Manage/alleviate anxiety  - Monitor for signs/symptoms of CO2 retention  5/1/2024 0929 by Catherine Cole RN  Outcome: Progressing  5/1/2024 0929 by Catherine Cole RN  Outcome: Progressing     Problem: Patient/Family Goals  Goal: Patient/Family Long Term Goal  Description: Patient's Long Term Goal: resolve chest pain and cough    Interventions:  - duonebs  - PRN cough medicine  - pulm consult  - See additional Care Plan goals for specific interventions  5/1/2024 0929 by Catherine Cole RN  Outcome: Progressing  5/1/2024 0929 by Catherine Cole RN  Outcome: Progressing  Goal: Patient/Family Short Term Goal  Description: Patient's Short Term Goal: sleep    Interventions:   - cluster care when appropriate  - provide a sleep/rest environment  - See additional Care Plan goals for specific interventions  5/1/2024 0929 by Catherine Cole RN  Outcome: Progressing  5/1/2024 0929 by Catherine Cole RN  Outcome: Progressing

## 2024-05-01 NOTE — PROGRESS NOTES
Pulmonary Progress Note     Assessment / Plan:  Coughing/Wheezing - improved  -due to asthma exacerbation related to flu A  -cough suppressants scheduled and PRN  Asthma exacerbation  -steroid taper  -start Breo  -BD protocol  -spacer ordered per patient request  Dispo  -okay to DC from pulmonary perspective. Follow-up in one week with MD or APN      Subjective:  Feels much better today. Cough and wheezing are improved. No new complaints. She wants to go home.    Objective:  Vitals:    04/30/24 1935 04/30/24 2333 05/01/24 0801 05/01/24 1147   BP: 96/63 96/54 113/71 107/69   BP Location: Right arm Right arm Right arm Right arm   Pulse: 81 98 81 83   Resp: 15 20 17 20   Temp: 97.6 °F (36.4 °C) 97.3 °F (36.3 °C) 97.9 °F (36.6 °C) 98.3 °F (36.8 °C)   TempSrc: Oral Oral Oral Oral   SpO2: 96% 99% 97% 96%   Weight:       Height:         Physical Exam:  General: no apparent distress, conversant  Skin: no rash, ulcers or subcutaneous nodules  Eyes: anicteric sclerae, moist conjunctivae  Head, ears, nose, throat: atraumatic, oropharynx clear with moist mucous membranes  Neck: trachea midline with no thyromegaly  Heart: regular rate and rhythm, no murmurs / rubs / gallops  Lungs: clear bilaterally, normal respiratory effort, no accessory muscle use  Extremities: no edema or cyanosis  Psych: interactive, answering questions appropriately, appropriate affect    Medications:  Reviewed in EMR    Lab Data:  Reviewed in EMR    Imaging:  I independently visualized all relevant chest imaging in PACS and agree with radiology interpretation except where noted.

## 2024-05-01 NOTE — PLAN OF CARE
NURSING DISCHARGE NOTE    Discharged Home via Wheelchair.  Accompanied by Family member  Belongings Taken by patient/family.  Iv removed  Medication changes discussed- discussed holding coumadin x 2 days then following up with her clinic  Follow ups discussed  Staff will continue to monitor

## (undated) DEVICE — #11 STERILE BLADE: Brand: POLYMER COATED BLADES

## (undated) DEVICE — KENDALL SCD EXPRESS SLEEVES, KNEE LENGTH, MEDIUM: Brand: KENDALL SCD

## (undated) DEVICE — MARKER SKIN 2 TIP

## (undated) DEVICE — PAD SACRAL SPAN AID

## (undated) DEVICE — SCD SLEEVE KNEE HI BLEND

## (undated) DEVICE — NEEDLE SPNL 22GA L3.5IN BLK QNCKE STYL DISP

## (undated) DEVICE — FILTERLINE NASAL ADULT O2/CO2

## (undated) DEVICE — VIOLET BRAIDED (POLYGLACTIN 910), SYNTHETIC ABSORBABLE SUTURE: Brand: COATED VICRYL

## (undated) DEVICE — ENDOSCOPY PACK - LOWER: Brand: MEDLINE INDUSTRIES, INC.

## (undated) DEVICE — EMG TUBE 8229706 NIM TRIVANTAGE 6.0MM ID: Brand: NIM TRIVANTAGE™

## (undated) DEVICE — SPONGE RAYTEC 4X4 RF DETECT

## (undated) DEVICE — ENDOPATH ULTRA VERESS INSUFFLATION NEEDLES WITH LUER LOCK CONNECTORS: Brand: ENDOPATH

## (undated) DEVICE — LAP CHOLE/APPY CDS-LF: Brand: MEDLINE INDUSTRIES, INC.

## (undated) DEVICE — CASED DISP BIPOLAR CORD

## (undated) DEVICE — DISPOSABLE LAPAROSCOPIC CLIP APPLIER WITH 20 CLIPS.: Brand: EPIX® UNIVERSAL CLIP APPLIER

## (undated) DEVICE — UNDYED BRAIDED (POLYGLACTIN 910), SYNTHETIC ABSORBABLE SUTURE: Brand: COATED VICRYL

## (undated) DEVICE — SUTURE VICRYL 0 UR-6

## (undated) DEVICE — TISSUE RETRIEVAL SYSTEM: Brand: INZII RETRIEVAL SYSTEM

## (undated) DEVICE — 3M(TM) TEGADERM(TM) TRANSPARENT FILM DRESSING FRAME STYLE 9505W: Brand: 3M™ TEGADERM™

## (undated) DEVICE — Device: Brand: DEFENDO AIR/WATER/SUCTION AND BIOPSY VALVE

## (undated) DEVICE — SOL  .9 1000ML BTL

## (undated) DEVICE — MEGADYNE E-Z CLEAN BLADE 2.75"

## (undated) DEVICE — CHLORAPREP ORANGE TINT 10.5ML

## (undated) DEVICE — 1200CC GUARDIAN II: Brand: GUARDIAN

## (undated) DEVICE — TROCAR: Brand: KII SHIELDED BLADED ACCESS SYSTEM

## (undated) DEVICE — 3M™ STERI-STRIP™ REINFORCED ADHESIVE SKIN CLOSURES, R1547, 1/2 IN X 4 IN (12 MM X 100 MM), 6 STRIPS/ENVELOPE: Brand: 3M™ STERI-STRIP™

## (undated) DEVICE — SUTURE MONOCRYL 4-0 PS-2

## (undated) DEVICE — LIGHT HANDLE

## (undated) DEVICE — PENCIL SMK EVAC L10FT MPLR BLDE JAW OPN

## (undated) DEVICE — TROCAR: Brand: KII FIOS FIRST ENTRY

## (undated) DEVICE — #10 STERILE BLADE: Brand: POLYMER COATED BLADES

## (undated) DEVICE — STERILE POLYISOPRENE POWDER-FREE SURGICAL GLOVES: Brand: PROTEXIS

## (undated) DEVICE — GAUZE SPONGES,8 PLY: Brand: CURITY

## (undated) DEVICE — GYN CDS: Brand: MEDLINE INDUSTRIES, INC.

## (undated) DEVICE — 3M™ RED DOT™ MONITORING ELECTRODE WITH FOAM TAPE AND STICKY GEL, 50/BAG, 20/CASE, 72/PLT 2570: Brand: RED DOT™

## (undated) DEVICE — CHLORAPREP 26ML APPLICATOR

## (undated) DEVICE — SOLUTION IV 100ML 0.9% NACL MINI BG

## (undated) DEVICE — #15 STERILE STAINLESS BLADE: Brand: STERILE STAINLESS BLADES

## (undated) DEVICE — HARMONIC FOCUS SHEARS 9CM LENGTH + ADAPTIVE TISSUE TECHNOLOGY FOR USE WITH BLUE HAND PIECE ONLY: Brand: HARMONIC FOCUS

## (undated) DEVICE — ARISTA 1 GRAM

## (undated) DEVICE — SOLUTION IRRIG 1000ML 0.9% NACL USP BTL

## (undated) DEVICE — DECANTER BAG 9": Brand: MEDLINE INDUSTRIES, INC.

## (undated) DEVICE — GLOVE SUR 7.5 SENSICARE PI PIP CRM PWD F

## (undated) DEVICE — FORCEP RADIAL JAW 4

## (undated) DEVICE — DERMABOND LIQUID ADHESIVE

## (undated) DEVICE — ANTIBACTERIAL VIOLET BRAIDED (POLYGLACTIN 910), SYNTHETIC ABSORBABLE SUTURE: Brand: COATED VICRYL

## (undated) DEVICE — SLEEVE COMPR MD KNEE LEN SGL USE KENDALL SCD

## (undated) DEVICE — HEAD AND NECK CDS-LF: Brand: MEDLINE INDUSTRIES, INC.

## (undated) DEVICE — MEDI-VAC SUCTION FINE CAPACITY: Brand: CARDINAL HEALTH

## (undated) DEVICE — PREMIUM WET SKIN PREP TRAY: Brand: MEDLINE INDUSTRIES, INC.

## (undated) DEVICE — Device

## (undated) DEVICE — PROBE 8225101 5PK STD PRASS FL TIP ROHS

## (undated) DEVICE — TUBING MEGADYNE SPECULUM

## (undated) DEVICE — SUT PERMA- 2-0 30IN FSL NABSRB BLK L30MM 3

## (undated) DEVICE — SPONGE: SPECIALTY PEANUT XR 100/CS: Brand: MEDICAL ACTION INDUSTRIES

## (undated) DEVICE — TROCAR: Brand: KII® SLEEVE

## (undated) DEVICE — RETRACT LONE STAR STAYS DULL

## (undated) NOTE — ED AVS SNAPSHOT
Benoit Natividadaron   MRN: [id number]    Department:  [facility] Emergency Department   Date of Visit:  11/27/2019           Disclosure     Insurance plans vary and the physician(s) referred by the ER may not be covered by your plan.  Please contact your tell this physician (or your personal doctor if your instructions are to return to your personal doctor) about any new or lasting problems. The primary care or specialist physician will see patients referred from the [facility] Emergency Department.  Nasir Lees

## (undated) NOTE — LETTER
[address] [facility] FOR BLOOD OR BLOOD COMPONENTS      In the course of your treatment, it may become necessary to administer a transfusion of blood or blood components.  This form provides basic information concerning this proc alternatives to you if it has not already been done. I,Blanche Rogel, have read/had read to me the above. I understand the matters bearing on the decision whether or not to authorize a transfusion of blood or blood components.  I have no questions which ha

## (undated) NOTE — LETTER
12/2/2019          Laya Ewing  [facility]    Dear Viri Jeter,       Here are the  biopsy/pathology findings from your recent Colonoscopy :    colitis - an inflammation of the large intestine (colon).       If you need any further as

## (undated) NOTE — LETTER
Nurys Leslie [address]  [address] [facility]  Authorization for Surgical Operation and Procedure     Date:___________                                                                                                         Time:__________ potential risks that can occur: fever and allergic reactions, hemolytic reactions, transmission of diseases such as Hepatitis, AIDS and Cytomegalovirus (CMV) and fluid overload.   In the event that I wish to have an autologous transfusion of my own blood, o attending physician will determine when the applicable recovery period ends for purposes of reinstating the DNAR order.   10. Patients having a sterilization procedure: I understand that if the procedure is successful the results will be permanent and it wi a. Allow the anesthesiologist (anesthesia doctor) to give me medicine and do additional procedures as necessary.  Some examples are: Starting or using an “IV” to give me medicine, fluids or blood during my procedure, and having a breathing tube placed to he 7. Regional Anesthesia (“spinal”, “epidural”, & “nerve blocks”): I understand that rare but potential complications include headache, bleeding, infection, seizure, irregular heart rhythms, and nerve injury.     I can change my mind about having anesthesia

## (undated) NOTE — LETTER
05/03/21    Laya Ewing      To Whom It May Concern:     This letter has been written at the patient's request. The above patient was seen at [facility] for treatment of a medical condition from 4/28/2021 - 5/3/2021    The patient may return to work/

## (undated) NOTE — LETTER
May 1, 2024  To Whom It May Concern,  Please let this letter certify that [employer]   was seen on May 1, 2024 at [facility] 12  pm   They may return to:        [x]  Work     []   School    []   P.E. Class   []  Sports     [x]  Without restrictions     []   With the following restrictions:      Additional Comments:         Please feel free to contact me at [phone] with any questions.   Sincerely,  Neo sotelo MD   No name on file